# Patient Record
Sex: FEMALE | Race: WHITE | Employment: OTHER | URBAN - METROPOLITAN AREA
[De-identification: names, ages, dates, MRNs, and addresses within clinical notes are randomized per-mention and may not be internally consistent; named-entity substitution may affect disease eponyms.]

---

## 2018-07-18 PROBLEM — M54.50 LOWER BACK PAIN: Status: ACTIVE | Noted: 2018-07-18

## 2018-07-18 PROBLEM — M81.0 OSTEOPOROSIS: Status: ACTIVE | Noted: 2018-07-18

## 2018-07-18 PROBLEM — I10 HTN (HYPERTENSION): Status: ACTIVE | Noted: 2018-07-18

## 2018-07-18 PROBLEM — R51.9 FACIAL PAIN: Status: ACTIVE | Noted: 2018-07-18

## 2018-07-18 PROBLEM — M15.9 GENERALIZED OSTEOARTHRITIS: Status: ACTIVE | Noted: 2018-07-18

## 2018-07-18 PROBLEM — E78.5 HYPERLIPIDEMIA: Status: ACTIVE | Noted: 2018-07-18

## 2018-07-18 RX ORDER — MELOXICAM 15 MG/1
15 TABLET ORAL DAILY
COMMUNITY
End: 2020-04-20 | Stop reason: SDUPTHER

## 2018-07-18 RX ORDER — FLUTICASONE PROPIONATE 50 MCG
1 SPRAY, SUSPENSION (ML) NASAL DAILY
COMMUNITY
End: 2020-05-07 | Stop reason: SDUPTHER

## 2018-07-18 RX ORDER — HYDROCODONE BITARTRATE AND ACETAMINOPHEN 5; 325 MG/1; MG/1
1 TABLET ORAL
COMMUNITY
End: 2018-10-29 | Stop reason: SDUPTHER

## 2018-07-18 RX ORDER — GABAPENTIN 100 MG/1
100 CAPSULE ORAL 2 TIMES DAILY
COMMUNITY
End: 2019-01-08 | Stop reason: SDUPTHER

## 2018-07-18 RX ORDER — TRAZODONE HYDROCHLORIDE 50 MG/1
TABLET ORAL
COMMUNITY
End: 2019-10-23 | Stop reason: SDUPTHER

## 2018-07-18 RX ORDER — ALENDRONATE SODIUM 70 MG/1
TABLET ORAL
COMMUNITY
End: 2019-10-23

## 2018-10-29 ENCOUNTER — OFFICE VISIT (OUTPATIENT)
Dept: FAMILY MEDICINE CLINIC | Age: 78
End: 2018-10-29

## 2018-10-29 DIAGNOSIS — R03.0 ELEVATED BP WITHOUT DIAGNOSIS OF HYPERTENSION: ICD-10-CM

## 2018-10-29 DIAGNOSIS — M54.41 CHRONIC BILATERAL LOW BACK PAIN WITH BILATERAL SCIATICA: Primary | ICD-10-CM

## 2018-10-29 DIAGNOSIS — M54.42 CHRONIC BILATERAL LOW BACK PAIN WITH BILATERAL SCIATICA: Primary | ICD-10-CM

## 2018-10-29 DIAGNOSIS — G89.29 CHRONIC BILATERAL LOW BACK PAIN WITH BILATERAL SCIATICA: Primary | ICD-10-CM

## 2018-10-29 DIAGNOSIS — G51.8 FACIAL NEURALGIA: ICD-10-CM

## 2018-10-29 RX ORDER — HYDROCODONE BITARTRATE AND ACETAMINOPHEN 5; 325 MG/1; MG/1
1 TABLET ORAL
Qty: 45 TAB | Refills: 0 | Status: SHIPPED | OUTPATIENT
Start: 2018-12-29 | End: 2019-01-22 | Stop reason: SDUPTHER

## 2018-10-29 RX ORDER — HYDROCODONE BITARTRATE AND ACETAMINOPHEN 5; 325 MG/1; MG/1
1 TABLET ORAL
Qty: 45 TAB | Refills: 0 | Status: SHIPPED | OUTPATIENT
Start: 2018-10-29 | End: 2018-10-29 | Stop reason: SDUPTHER

## 2018-10-29 RX ORDER — HYDROCODONE BITARTRATE AND ACETAMINOPHEN 5; 325 MG/1; MG/1
1 TABLET ORAL
Qty: 45 TAB | Refills: 0 | Status: SHIPPED | OUTPATIENT
Start: 2018-11-29 | End: 2018-10-29 | Stop reason: SDUPTHER

## 2018-10-29 NOTE — PATIENT INSTRUCTIONS
Learning About How to Have a Healthy Back What causes back pain? Back pain is often caused by overuse, strain, or injury. For example, people often hurt their backs playing sports or working in the yard, being jolted in a car accident, or lifting something too heavy. Aging plays a part too. Your bones and muscles tend to lose strength as you age, which makes injury more likely. The spongy discs between the bones of the spine (vertebrae) may suffer from wear and tear and no longer provide enough cushion between the bones. A disc that bulges or breaks open (herniated disc) can press on nerves, causing back pain. In some people, back pain is the result of arthritis, broken vertebrae caused by bone loss (osteoporosis), illness, or a spine problem. Although most people have back pain at one time or another, there are steps you can take to make it less likely. How can you have a healthy back? Reduce stress on your back through good posture Slumping or slouching alone may not cause low back pain. But after the back has been strained or injured, bad posture can make pain worse. · Sleep in a position that maintains your back's normal curves and on a mattress that feels comfortable. Sleep on your side with a pillow between your knees, or sleep on your back with a pillow under your knees. These positions can reduce strain on your back. · Stand and sit up straight. \"Good posture\" generally means your ears, shoulders, and hips are in a straight line. · If you must stand for a long time, put one foot on a stool, ledge, or box. Switch feet every now and then. · Sit in a chair that is low enough to let you place both feet flat on the floor with both knees nearly level with your hips. If your chair or desk is too high, use a footrest to raise your knees. Place a small pillow, a rolled-up towel, or a lumbar roll in the curve of your back if you need extra support. · Try a kneeling chair, which helps tilt your hips forward. This takes pressure off your lower back. · Try sitting on an exercise ball. It can rock from side to side, which helps keep your back loose. · When driving, keep your knees nearly level with your hips. Sit straight, and drive with both hands on the steering wheel. Your arms should be in a slightly bent position. Reduce stress on your back through careful lifting · Squat down, bending at the hips and knees only. If you need to, put one knee to the floor and extend your other knee in front of you, bent at a right angle (half kneeling). · Press your chest straight forward. This helps keep your upper back straight while keeping a slight arch in your low back. · Hold the load as close to your body as possible, at the level of your belly button (navel). · Use your feet to change direction, taking small steps. · Lead with your hips as you change direction. Keep your shoulders in line with your hips as you move. · Set down your load carefully, squatting with your knees and hips only. Exercise and stretch your back · Do some exercise on most days of the week, if your doctor says it is okay. You can walk, run, swim, or cycle. · Stretch your back muscles. Here are a few exercises to try: 
? Lie on your back, and gently pull one bent knee to your chest. Put that foot back on the floor, and then pull the other knee to your chest. 
? Do pelvic tilts. Lie on your back with your knees bent. Tighten your stomach muscles. Pull your belly button (navel) in and up toward your ribs. You should feel like your back is pressing to the floor and your hips and pelvis are slightly lifting off the floor. Hold for 6 seconds while breathing smoothly. ? Sit with your back flat against a wall. · Keep your core muscles strong. The muscles of your back, belly (abdomen), and buttocks support your spine. ? Pull in your belly and imagine pulling your navel toward your spine. Hold this for 6 seconds, then relax. Remember to keep breathing normally as you tense your muscles. ? Do curl-ups. Always do them with your knees bent. Keep your low back on the floor, and curl your shoulders toward your knees using a smooth, slow motion. Keep your arms folded across your chest. If this bothers your neck, try putting your hands behind your neck (not your head), with your elbows spread apart. ? Lie on your back with your knees bent and your feet flat on the floor. Tighten your belly muscles, and then push with your feet and raise your buttocks up a few inches. Hold this position 6 seconds as you continue to breathe normally, then lower yourself slowly to the floor. Repeat 8 to 12 times. ? If you like group exercise, try Pilates or yoga. These classes have poses that strengthen the core muscles. Lead a healthy lifestyle · Stay at a healthy weight to avoid strain on your back. · Do not smoke. Smoking increases the risk of osteoporosis, which weakens the spine. If you need help quitting, talk to your doctor about stop-smoking programs and medicines. These can increase your chances of quitting for good. Where can you learn more? Go to http://tiffany-maryanne.info/. Enter L315 in the search box to learn more about \"Learning About How to Have a Healthy Back. \" Current as of: November 29, 2017 Content Version: 11.8 © 9318-1149 [employer]. Care instructions adapted under license by [employer] (which disclaims liability or warranty for this information). If you have questions about a medical condition or this instruction, always ask your healthcare professional. [place] [address] any warranty or liability for your use of this information. Elevated Blood Pressure: Care Instructions Your Care Instructions Blood pressure is a measure of how hard the blood pushes against the walls of your arteries. It's normal for blood pressure to go up and down throughout the day. But if it stays up over time, you have high blood pressure. Two numbers tell you your blood pressure. The first number is the systolic pressure. It shows how hard the blood pushes when your heart is pumping. The second number is the diastolic pressure. It shows how hard the blood pushes between heartbeats, when your heart is relaxed and filling with blood. An ideal blood pressure in adults is less than 120/80 (say \"120 over 80\"). High blood pressure is 140/90 or higher. You have high blood pressure if your top number is 140 or higher or your bottom number is 90 or higher, or both. The main test for high blood pressure is simple, fast, and painless. To diagnose high blood pressure, your doctor will test your blood pressure at different times. After testing your blood pressure, your doctor may ask you to test it again when you are home. If you are diagnosed with high blood pressure, you can work with your doctor to make a long-term plan to manage it. Follow-up care is a key part of your treatment and safety. Be sure to make and go to all appointments, and call your doctor if you are having problems. It's also a good idea to know your test results and keep a list of the medicines you take. How can you care for yourself at home? · Do not smoke. Smoking increases your risk for heart attack and stroke. If you need help quitting, talk to your doctor about stop-smoking programs and medicines. These can increase your chances of quitting for good. · Stay at a healthy weight. · Try to limit how much sodium you eat to less than 2,300 milligrams (mg) a day. Your doctor may ask you to try to eat less than 1,500 mg a day. · Be physically active. Get at least 30 minutes of exercise on most days of the week. Walking is a good choice.  You also may want to do other activities, such as running, swimming, cycling, or playing tennis or team sports. · Avoid or limit alcohol. Talk to your doctor about whether you can drink any alcohol. · Eat plenty of fruits, vegetables, and low-fat dairy products. Eat less saturated and total fats. · Learn how to check your blood pressure at home. When should you call for help? Call your doctor now or seek immediate medical care if: 
? · Your blood pressure is much higher than normal (such as 180/110 or higher). ? · You think high blood pressure is causing symptoms such as: ¨ Severe headache. ¨ Blurry vision. ? Watch closely for changes in your health, and be sure to contact your doctor if: 
? · You do not get better as expected. Where can you learn more? Go to http://tiffany-maryanne.info/. Enter Z663 in the search box to learn more about \"Elevated Blood Pressure: Care Instructions. \" Current as of: September 21, 2016 Content Version: 11.4 © 8475-4641 [employer]. Care instructions adapted under license by [employer] (which disclaims liability or warranty for this information). If you have questions about a medical condition or this instruction, always ask your healthcare professional. [place] [address] any warranty or liability for your use of this information.

## 2018-10-29 NOTE — PROGRESS NOTES
Identified pt with two pt identifiers(name and ). Reviewed record in preparation for visit and have obtained necessary documentation. Chief Complaint Patient presents with  Neuroma  
  nerve pain Health Maintenance Due Topic  DTaP/Tdap/Td series (1 - Tdap)  Shingrix Vaccine Age 50> (1 of 2)  GLAUCOMA SCREENING Q2Y  Bone Densitometry (Dexa) Screening  Pneumococcal 65+ Low/Medium Risk (1 of 2 - PCV13)  Influenza Age 5 to Adult Coordination of Care Questionnaire: 
:  
1) Have you been to an emergency room, urgent care, or hospitalized since your last visit?   no If yes, where when, and reason for visit? 2. Have seen or consulted any other health care provider since your last visit? NO If yes, where when, and reason for visit? 3) Do you have an Advanced Directive/ Living Will in place? NO If yes, do we have a copy on file NO If no, would you like information NO Patient is accompanied by self I have received verbal consent from Jimmy Alaniz to discuss any/all medical information while they are present in the room.

## 2018-10-29 NOTE — PROGRESS NOTES
Subjective Armond Arora is an 66 y.o. female who presents for lower back pain HPI Lower Back Pain Duration: > 5 years Medications: patient takes Gabapentin 100mg, 2tabs in evening, Norco 5-325 1.5 daily LOCATION: in the sacroiliac area RADIATION: left and right buttock and anterior and posterior thigh CHARACTER OF PAIN: intermittent, moderate in intensity, burning, and stinging ACUTE OR CHRONIC?: a chronic problem, with essentially constant pain AGGRAVATING FACTORS: laying down ASSOCIATED SYMPTOMS: restless legs and buttock and entire legs hurt. She denies any incontinence, saddle anesthesia, or LE weakness Prior Treatment: patient reports having laminectomy for this in the past by Dr. Minerva [facility] [place], [place]) Facial Pain Duration: 3 years Patient reports that she fell on her face and broke part of her right orbit several years ago Medications: patient takes Gabapentin 100mg, 2tabs in evening, Norco 5-325 1.5 daily Associated Symptoms: patient denies any problems with vision or swallowing Controlled Medicines Medicine: Norco 
Last Dose: this morning Current Dosing: takes 1 tab daily UDS: 2/2018 within normal limits Review of Systems Respiratory: Negative for apnea and chest tightness. Cardiovascular: Negative for chest pain and leg swelling. Allergies - reviewed: Allergies Allergen Reactions  Alendronate Unknown (comments)  Doxycycline Unknown (comments)  Erythromycin Unknown (comments)  Nsaids (Non-Steroidal Anti-Inflammatory Drug) Unknown (comments)  Pcn [Penicillins] Unknown (comments) Medications - reviewed:  
Current Outpatient Medications Medication Sig  [START ON 12/29/2018] HYDROcodone-acetaminophen (NORCO) 5-325 mg per tablet Take 1 Tab by mouth every twelve (12) hours as needed for Pain. Max Daily Amount: 2 Tabs.  fluticasone (FLONASE) 50 mcg/actuation nasal spray 1 Irvona by Both Nostrils route daily.  gabapentin (NEURONTIN) 100 mg capsule Take 100 mg by mouth two (2) times a day.  meloxicam (MOBIC) 15 mg tablet Take 15 mg by mouth daily.  traZODone (DESYREL) 50 mg tablet Take  by mouth nightly.  alendronate (FOSAMAX) 70 mg tablet Take  by mouth every seven (7) days. No current facility-administered medications for this visit. Past Medical History - reviewed: 
Past Medical History:  
Diagnosis Date  Allergic rhinitis  Carpal tunnel syndrome  Facial pain 7/18/2018  Fibromyalgia  Generalized osteoarthritis 7/18/2018  GERD (gastroesophageal reflux disease)  Hyperlipemia  Hyperlipidemia 7/18/2018  Hypertension  IBS (irritable bowel syndrome)  Lower back pain 7/18/2018  Osteoarthritis  Osteoporosis 7/18/2018 Past Surgical History - reviewed:  
Past Surgical History:  
Procedure Laterality Date  HX COLONOSCOPY  01/2010  HX HYSTERECTOMY  HX OTHER SURGICAL    
 lumbar disc surgery  HX SALPINGO-OOPHORECTOMY Social History - reviewed: 
Social History Socioeconomic History  Marital status:  Spouse name: Not on file  Number of children: Not on file  Years of education: Not on file  Highest education level: Not on file Social Needs  Financial resource strain: Not on file  Food insecurity - worry: Not on file  Food insecurity - inability: Not on file  Transportation needs - medical: Not on file  Transportation needs - non-medical: Not on file Occupational History  Not on file Tobacco Use  Smoking status: Never Smoker  Smokeless tobacco: Never Used Substance and Sexual Activity  Alcohol use: No  
  Frequency: Never  Drug use: No  
 Sexual activity: Not on file Other Topics Concern  Not on file Social History Narrative  Not on file Family History - reviewed: 
Family History Problem Relation Age of Onset  Stroke Mother  Heart Attack Father  Hypertension Brother  Inflammatory Bowel Dz Brother Visit Vitals /86 (BP 1 Location: Left arm, BP Patient Position: Sitting) Pulse 69 Temp 98.3 °F (36.8 °C) (Oral) Resp 18 Ht 5' 3\" (1.6 m) Wt 118 lb (53.5 kg) BMI 20.90 kg/m² Physical Exam  
Constitutional: well-developed and well-nourished. HENT:  
Head: Normocephalic and atraumatic. Eyes: Conjunctivae are normal. Pupils are equal, round, and reactive to light. Neck: Normal range of motion. Neck supple. No JVD present. No tracheal deviation present. No thyromegaly present. Cardiovascular: Normal rate, regular rhythm and normal heart sounds. Exam reveals no friction rub. No murmur heard. Pulmonary/Chest: Effort normal and breath sounds normal. No stridor. No respiratory distress. no wheezes. no rales. no tenderness. BACK EXAM:  
Inspection: exaggerated lumbar lordosis;  midline laminectomy scar;  
Palpation: normal palpation of the bony posterior spinous processes, the sacral spine, iliac crest, posterior iliac spine and coccyx  without soft tissue abnormality; Neurovascular: reduced throughout.;  
Muscular Strength: 5/5 bilateral quadriceps;  5/5 bilateral tibialis anterior;  5/5 bilateral extensor hallucis longus;  5/5 bilateral extensor digitorum longus and brevis;  5/5 bilateral peroneus longus and brevis;  5/5 bilateral gastrocnemius-soleus;  
Range of Motion: limited passive ROM with extension, left lateral bending, and right lateral bending;  
Maneuvers: (-) bilateral straight leg raise Assessment/Plan 1. Chronic bilateral low back pain with bilateral sciatica: Stable.  wnl. Controlled substances contract on file. UDS within last year. Discussed precautions with medications. Patient acknowledges understanding. Will refill for 3 months 
- HYDROcodone-acetaminophen (NORCO) 5-325 mg per tablet;  Take 1 Tab by mouth every twelve (12) hours as needed for Pain. Max Daily Amount: 2 Tabs. Dispense: 45 Tab; Refill: 0 
 
2. Facial neuralgia: stable 
- see plan as above 3. Elevated BP without diagnosis of hypertension: patient's BP elevated today, but she denies any HA, dizziness, or other concerning symptoms. She declines coming back in tomorrow for nurse visit, but states that she will schedule a one week follow up. She would like to hold off on any BP medications until that time. -RTC 1 week for BP check Follow-up Disposition: 
Return in about 1 week (around 11/5/2018) for blood pressure. I have discussed the diagnosis with the patient and the intended plan as seen in the above orders. Patient verbalized understanding of the plan and agrees with the plan. The patient has received an after-visit summary and questions were answered concerning future plans. I have discussed medication side effects and warnings with the patient as well. Informed patient to return to the office if new symptoms arise. Bernarda Mishra MD 
Family Medicine Resident

## 2018-10-29 NOTE — LETTER
Name:Sailaja Laughlin HO MR #:[id number] Provider Amy Leyva MD  
*[id number]* BSMG-491 () Page 1 of 5 Initial [employer] CONTROLLED SUBSTANCE AGREEMENT I may be prescribed medications that are controlled substances as part  of my treatment plan for management of my medical condition(s). The goal of my treatment plan is to maintain and/or improve my health and wellbeing. Because controlled substances have an increased risk of abuse or harm, continual re-evaluation is needed determine if the goals of my treatment plan are being met for my safety and the safety of others. Saúl Ill  am entering into this Controlled Substance Agreement with my provider, David Dumont MD at [facility] . I understand that successful treatment requires mutual trust and honesty between me and my provider. I understand that there are state and federal laws and regulations which apply to the medications that my provider may prescribe that must be followed. I understand there are risks and benefits ts of taking the medicines that my provider may prescribe. I understand and agree that following this Agreement is necessary in continuing my provider-patient relationship and success of my treatment plan. As a part of my treatment plan, I agree to the following: COMMUNICATION: 
 
1. I will communicate fully with my provider about my medical condition(s), including the effect on my daily life and how well my medications are helping. I will tell my provider all of the medications that I take for any reason, including medications I receive from another health care provider, and will notify my provider about all issues, problems or concerns, including any side effects, which may be related to my medications. I understand that this information allows my provider to adjust my treatment plan to help manage my medical condition.  I understand that this information will become part of my permanent medical record. 2. I will notify my provider if I have a history of alcohol/drug misuse/addiction or if I have had treatment for alcohol/drug addiction in the past, or if I have a new problem with or concern about alcohol/drug use/addiction, because this increases the likelihood of high risk behaviors and may lead to serious medical conditions. 3. Females Only: I will notify my provider if I am or become pregnant, or if I intend to become pregnant, or if I intend to breastfeed. I understand that communication of these issues with my provider is important, due to possible effects my medication could have on an unborn fetus or breastfeeding child. Name:Sailaja Laughlin S:[id number] MR #:[id number] Provider Nelson Murphy MD  
*[id number]* BSMG-491 (5/16) Page 2 of 5 Initial [employer] MISUSE OF MEDICATIONS / DRUGS: 
 
1. I agree to take all controlled substances as prescribed, and will not misuse or abuse any controlled substances prescribed by my provider. For my safety, I will not increase the amount of medicine I take without first talking with and getting permission from my provider. 2. If I have a medical emergency, another health care provider may prescribe me medication. If I seek emergency treatment, I will notify my provider within seventy-two (72) hours. 3. I understand that my provider may discuss my use and/or possible misuse/abuse of controlled substances and alcohol, as appropriate, with any health care provider involved in my care, pharmacist or legal authority. ILLEGAL DRUGS: 
 
1. I will not use illegal drugs of any kind, including but not limited to marijuana, heroin, cocaine, or any prescription drug which is not prescribed to me. DRUG DIVERSION / PRESCRIPTION FRAUD: 
 
1. I will not share, sell, trade, give away, or otherwise misuse my prescriptions or medications. 2. I will not alter any prescriptions provided to me by my provider. SINGLE PROVIDER: 
 
1. I agree that all controlled substances that I take will be prescribed only by my provider (or his/her covering provider) under this Agreement. This agreement does not prevent me from seeking emergency medical treatment or receiving pain management related to a surgery. PROTECTING MEDICATIONS: 
 
1. I am responsible for keeping my prescriptions and medications in a safe and secure place including safeguarding them from loss or theft. I understand that lost, stolen or damaged/destroyed prescriptions or medications will not be replaced. Name:Sailaja Laughlin RJV:27/3/1807 MR #:[id number] Provider Juancho Jacobo MD  
*[id number]* BSMG-491 (5/16) Page 3 of 5 Initial [employer] PRESCRIPTION RENEWALS/REFILLS: 
 
1. I will follow my controlled substance medication schedule as prescribed by my provider. 2. I understand and agree that I will make any requests for renewals or refills of my prescriptions only at the time of an office visit or during my providers regular office hours subject to the prescription refill requirements of the individual practice. 3. I understand that my provider may not call in prescriptions for controlled substances to my pharmacy. 4. I understand that my provider may adjust or discontinue these medications as deemed appropriate for my medical treatment plan. This Agreement does not guarantee the prescription of controlled medications. 5. I agree that if my medications are adjusted or discontinued, I will properly dispose of any remaining medications. I understand that I will be required to dispose of any remaining controlled medications prior to being provided with any prescriptions for other controlled medications.  
 
 
1. I authorize my provider and my pharmacy to cooperate fully with any local, state, or federal law enforcement agency in the investigation of any possible misuse, sale, or other diversion of my controlled substance prescriptions or medications. RISKS: 
 
 
1. I understand that if I do not adhere to this Agreement in any way, my provider may change my prescriptions, stop prescribing controlled substances or end our provider-patient relationship. 2. If my provider decides to stop prescribing medication, or decides to end our provider-patient relationship,my provider may require that I taper my medications slowly. If necessary, my provider may also provide a prescription for other medications to treat my withdrawal symptoms. UNDERSTANDING THIS AGREEMENT: 
 
I understand that my provider may adjust or stop my prescriptions for controlled substances based on my medical condition and my treatment plan. I understand that this Agreement does not guarantee that I will be prescribed medications or controlled substances. I understand that controlled substances may be just one part 
of my treatment plan. My initial on each page and my signature below shows that I have read each page of this Agreement, I have had an opportunity to ask questions, and all of my questions have been answered to my satisfaction by my provider. By signing below, I agree to comply with this Agreement, and I understand that if I do not follow the Agreements listed above, my provider may stop 
 
 
 
_________________________________________  Date/Time 10/29/2018 11:11 AM   
             (Patient Signature)

## 2018-10-31 VITALS
WEIGHT: 118 LBS | RESPIRATION RATE: 18 BRPM | TEMPERATURE: 98.3 F | BODY MASS INDEX: 20.91 KG/M2 | SYSTOLIC BLOOD PRESSURE: 174 MMHG | HEART RATE: 69 BPM | DIASTOLIC BLOOD PRESSURE: 86 MMHG | HEIGHT: 63 IN

## 2019-01-08 RX ORDER — GABAPENTIN 100 MG/1
CAPSULE ORAL
Qty: 180 CAP | Refills: 1 | Status: SHIPPED | OUTPATIENT
Start: 2019-01-08 | End: 2019-08-01 | Stop reason: ALTCHOICE

## 2019-01-22 ENCOUNTER — OFFICE VISIT (OUTPATIENT)
Dept: FAMILY MEDICINE CLINIC | Age: 79
End: 2019-01-22

## 2019-01-22 VITALS
RESPIRATION RATE: 16 BRPM | TEMPERATURE: 98.4 F | DIASTOLIC BLOOD PRESSURE: 68 MMHG | HEART RATE: 100 BPM | WEIGHT: 114 LBS | SYSTOLIC BLOOD PRESSURE: 166 MMHG | HEIGHT: 63 IN | BODY MASS INDEX: 20.2 KG/M2 | OXYGEN SATURATION: 98 %

## 2019-01-22 DIAGNOSIS — M54.42 CHRONIC BILATERAL LOW BACK PAIN WITH BILATERAL SCIATICA: ICD-10-CM

## 2019-01-22 DIAGNOSIS — M54.41 CHRONIC BILATERAL LOW BACK PAIN WITH BILATERAL SCIATICA: ICD-10-CM

## 2019-01-22 DIAGNOSIS — I10 ESSENTIAL HYPERTENSION: ICD-10-CM

## 2019-01-22 DIAGNOSIS — G50.0 INFRAORBITAL NEURALGIA: Primary | ICD-10-CM

## 2019-01-22 DIAGNOSIS — G89.29 CHRONIC BILATERAL LOW BACK PAIN WITH BILATERAL SCIATICA: ICD-10-CM

## 2019-01-22 RX ORDER — LISINOPRIL 5 MG/1
5 TABLET ORAL DAILY
Qty: 90 TAB | Refills: 1 | Status: SHIPPED | OUTPATIENT
Start: 2019-01-22 | End: 2019-03-28 | Stop reason: DRUGHIGH

## 2019-01-22 RX ORDER — HYDROCODONE BITARTRATE AND ACETAMINOPHEN 5; 325 MG/1; MG/1
1 TABLET ORAL
Qty: 45 TAB | Refills: 0 | Status: SHIPPED | OUTPATIENT
Start: 2019-03-01 | End: 2019-01-22 | Stop reason: SDUPTHER

## 2019-01-22 RX ORDER — HYDROCODONE BITARTRATE AND ACETAMINOPHEN 5; 325 MG/1; MG/1
1 TABLET ORAL
Qty: 45 TAB | Refills: 0 | Status: SHIPPED | OUTPATIENT
Start: 2019-04-01 | End: 2019-04-25 | Stop reason: SDUPTHER

## 2019-01-22 RX ORDER — HYDROCODONE BITARTRATE AND ACETAMINOPHEN 5; 325 MG/1; MG/1
1 TABLET ORAL
Qty: 45 TAB | Refills: 0 | Status: SHIPPED | OUTPATIENT
Start: 2019-01-29 | End: 2019-01-22 | Stop reason: SDUPTHER

## 2019-01-22 NOTE — PATIENT INSTRUCTIONS
Learning About ACE Inhibitors  Introduction    ACE (angiotensin-converting enzyme) inhibitors stop the release of an enzyme. This enzyme makes your blood vessels smaller. Without it, your blood vessels relax and get bigger. This lowers your blood pressure. These medicines also increase how much water and salt go into your urine. This also lowers blood pressure. You may take this kind of medicine if you have high blood pressure. Or you may take it if you have heart problems, kidney problems, or diabetes. If you have coronary artery disease, this medicine can help prevent heart attacks and strokes. Examples  · Benazepril (Lotensin)  · Lisinopril (Prinivil, Zestril)  · Ramipril (Altace)  This is not a complete list.  Possible side effects  Side effects may include:  · A cough. · Low blood pressure. This can make you feel dizzy or weak. · Too much potassium in your body. · Swelling. This may be a sign of an allergic reaction. You may have other side effects or reactions not listed here. Check the information that comes with your medicine. What to know about taking this medicine  · ACE inhibitors can cause a dry cough. Talk to your doctor if you have a dry cough. You may need a different medicine. · These medicines can cause an allergic reaction. This can cause a little swelling. Or it can cause red bumps on your skin that hurt. In rare cases, the swelling may make it hard for you to breathe. · Do not take this medicine if you are pregnant. And don't take it if you plan to become pregnant. · Take your medicines exactly as prescribed. Call your doctor if you think you are having a problem with your medicine. · Check with your doctor or pharmacist before you use any other medicines. This includes over-the-counter medicines. Make sure your doctor knows all of the medicines, vitamins, herbal products, and supplements you take. Taking some medicines together can cause problems.   · You may need regular blood tests.  Where can you learn more? Go to http://tiffany-maryanne.info/. Enter P050 in the search box to learn more about \"Learning About ACE Inhibitors. \"  Current as of: July 22, 2018  Content Version: 11.9  © 0670-5633 [employer]. Care instructions adapted under license by [employer] (which disclaims liability or warranty for this information). If you have questions about a medical condition or this instruction, always ask your healthcare professional. [place] [address] any warranty or liability for your use of this information.

## 2019-01-22 NOTE — PROGRESS NOTES
Chief Complaint   Patient presents with    Medication Refill     HYDROCODONE/ACETAMENAPHIN        Health Maintenance Due   Topic    DTaP/Tdap/Td series (1 - Tdap)    Shingrix Vaccine Age 50> (1 of 2)    GLAUCOMA SCREENING Q2Y     Bone Densitometry (Dexa) Screening     Influenza Age 5 to Adult     MEDICARE YEARLY EXAM        Wt Readings from Last 3 Encounters:   01/22/19 114 lb (51.7 kg)   10/29/18 118 lb (53.5 kg)     Temp Readings from Last 3 Encounters:   01/22/19 98.4 °F (36.9 °C) (Oral)   10/29/18 98.3 °F (36.8 °C) (Oral)     BP Readings from Last 3 Encounters:   01/22/19 167/80   10/29/18 174/86     Pulse Readings from Last 3 Encounters:   01/22/19 100   10/29/18 69         Learning Assessment:  :     No flowsheet data found. Depression Screening:  :     PHQ over the last two weeks 1/22/2019   Little interest or pleasure in doing things Not at all   Feeling down, depressed, irritable, or hopeless Not at all   Total Score PHQ 2 0       Fall Risk Assessment:  :     Fall Risk Assessment, last 12 mths 1/22/2019   Able to walk? Yes   Fall in past 12 months? No       Abuse Screening:  :     No flowsheet data found. Coordination of Care Questionnaire:  :     1) Have you been to an emergency room, urgent care clinic since your last visit? NO   Hospitalized since your last visit? NO          2) Have you seen or consulted any other health care providers outside of [address] [place] [address] since your last visit? NO      3) Do you have an Advance Directive on file? NO    Patient is accompanied by self I have received verbal consent from Whitney Farias to discuss any/all medical information while they are present in the room.

## 2019-01-22 NOTE — PROGRESS NOTES
Anisha Flaherty is an 66 y.o. female who presents for infraorbital neuralgia and low back pain     HPI  Lower Back Pain  Duration: > 5 years  Medications: patient takes Gabapentin 100mg, 2tabs in evening, Norco 5-325 1.5 daily  LOCATION: in the sacroiliac area   RADIATION: left and right buttock and anterior and posterior thigh   CHARACTER OF PAIN: intermittent, moderate in intensity, burning, and stinging   ACUTE OR CHRONIC?: a chronic problem, with essentially constant pain   AGGRAVATING FACTORS: laying down   ASSOCIATED SYMPTOMS: restless legs and buttock and entire legs hurt. She denies any fecal incontinence, saddle anesthesia, or LE weakness  Prior Treatment: patient reports having laminectomy for this in the past by Dr. Smiley Sotomayor [facility] [place], [place])     Facial Pain  Duration: 3 years  Patient reports that she fell on her face and broke part of her right orbit several years ago  Medications: patient takes Gabapentin 100mg, 2tabs in evening, Norco 5-325 1.5 daily  Associated Symptoms: patient denies any problems with vision or swallowing     Controlled Medicines  Medicine: Norco  Last Dose: last night  Current Dosing: takes 1.5 tabs daily  UDS: 2/2018 within normal limits  Last Rx: 10/29/18, 45 tabs/month with 2 refills           Review of Systems   Respiratory: Negative for apnea and chest tightness. Cardiovascular: Negative for chest pain and leg swelling. Allergies - reviewed: Allergies   Allergen Reactions    Alendronate Unknown (comments)    Doxycycline Unknown (comments)    Erythromycin Unknown (comments)    Nsaids (Non-Steroidal Anti-Inflammatory Drug) Unknown (comments)    Pcn [Penicillins] Unknown (comments)         Medications - reviewed:   Current Outpatient Medications   Medication Sig    [START ON 4/1/2019] HYDROcodone-acetaminophen (NORCO) 5-325 mg per tablet Take 1 Tab by mouth every twelve (12) hours as needed for Pain. Max Daily Amount: 2 Tabs.     lisinopril (PRINIVIL, ZESTRIL) 5 mg tablet Take 1 Tab by mouth daily.  gabapentin (NEURONTIN) 100 mg capsule TAKE 2 CAPSULES EVERY DAY  IN  MID  AFTERNOON  FOR  RESTLESS LEGS AT NIGHT    fluticasone (FLONASE) 50 mcg/actuation nasal spray 1 [place] by Both Nostrils route daily.  meloxicam (MOBIC) 15 mg tablet Take 15 mg by mouth daily.  traZODone (DESYREL) 50 mg tablet Take  by mouth nightly.  alendronate (FOSAMAX) 70 mg tablet Take  by mouth every seven (7) days. No current facility-administered medications for this visit.           Past Medical History - reviewed:  Past Medical History:   Diagnosis Date    Allergic rhinitis     Carpal tunnel syndrome     Facial pain 7/18/2018    Fibromyalgia     Generalized osteoarthritis 7/18/2018    GERD (gastroesophageal reflux disease)     Hyperlipemia     Hyperlipidemia 7/18/2018    Hypertension     IBS (irritable bowel syndrome)     Lower back pain 7/18/2018    Osteoarthritis     Osteoporosis 7/18/2018         Past Surgical History - reviewed:   Past Surgical History:   Procedure Laterality Date    HX COLONOSCOPY  01/2010    HX HYSTERECTOMY      HX OTHER SURGICAL      lumbar disc surgery    HX SALPINGO-OOPHORECTOMY           Social History - reviewed:  Social History     Socioeconomic History    Marital status:      Spouse name: Not on file    Number of children: Not on file    Years of education: Not on file    Highest education level: Not on file   Social Needs    Financial resource strain: Not on file    Food insecurity - worry: Not on file    Food insecurity - inability: Not on file   [employer] needs - medical: Not on file   [employer] needs - non-medical: Not on file   Occupational History    Not on file   Tobacco Use    Smoking status: Never Smoker    Smokeless tobacco: Never Used   Substance and Sexual Activity    Alcohol use: No     Frequency: Never    Drug use: No    Sexual activity: Yes   Other Topics Concern    Not on file Social History Narrative    Not on file         Family History - reviewed:  Family History   Problem Relation Age of Onset    Stroke Mother     Heart Attack Father     Hypertension Brother     Inflammatory Bowel Dz Brother          Visit Vitals  /68   Pulse 100   Temp 98.4 °F (36.9 °C) (Oral)   Resp 16   Ht 5' 3\" (1.6 m)   Wt 114 lb (51.7 kg)   SpO2 98%   BMI 20.19 kg/m²     Physical Exam   Constitutional: well-developed and well-nourished. HENT:   Head: Normocephalic and atraumatic. Eyes: Conjunctivae are normal. Pupils are equal, round, and reactive to light. Neck: Normal range of motion. Neck supple. No JVD present. No tracheal deviation present. No thyromegaly present. Cardiovascular: Normal rate, regular rhythm and normal heart sounds. Exam reveals no friction rub. No murmur heard. Pulmonary/Chest: Effort normal and breath sounds normal. No stridor. No respiratory distress. no wheezes. no rales. no tenderness. Neuro: CN 2-11 grossly intact, facial sensation intact bilaterally.      BACK EXAM:   Inspection: exaggerated lumbar lordosis;  midline laminectomy scar;   Palpation: normal palpation of the bony posterior spinous processes, the sacral spine, iliac crest, posterior iliac spine and coccyx  without soft tissue abnormality; Neurovascular: reduced throughout.;   Muscular Strength: 5/5 bilateral quadriceps;  5/5 bilateral tibialis anterior;  5/5 bilateral extensor hallucis longus;  5/5 bilateral extensor digitorum longus and brevis;  5/5 bilateral peroneus longus and brevis;  5/5 bilateral gastrocnemius-soleus;   Range of Motion: limited passive ROM with extension, left lateral bending, and right lateral bending;   Maneuvers:   (-) bilateral straight leg raise     Assessment/Plan  1. 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 Stable on current treatment.  wnl. Controlled substances contract on file.  UDS within last year. Discussed precautions with medications. Patient acknowledges understanding. Will refill for 3 months  - HYDROcodone-acetaminophen (NORCO) 5-325 mg per tablet; Take 1 Tab by mouth every twelve (12) hours as needed for Pain. Max Daily Amount: 2 Tabs. Dispense: 45 Tab; Refill: 2  - continue gabapentin    2. Essential hypertension: BP elevated on multiple visits. Will initiate treatment with low dose Lisinopril and bring patient back in 1 week for nurse BP check. - lisinopril (PRINIVIL, ZESTRIL) 5 mg tablet; Take 1 Tab by mouth daily. Dispense: 90 Tab; Refill: 1  - discussed reasons to call or go to ED    3. Chronic bilateral low back pain with bilateral sciatica: stable with no new LE weakness, saddle anesthesia, or incontinence  - see pain management per above    Follow-up Disposition:  Return in about 4 weeks (around 2/19/2019) for annual physical.      I have discussed the diagnosis with the patient and the intended plan as seen in the above orders. Patient verbalized understanding of the plan and agrees with the plan. The patient has received an after-visit summary and questions were answered concerning future plans. I have discussed medication side effects and warnings with the patient as well. Informed patient to return to the office if new symptoms arise.         Adrianne Jones MD  Family Medicine Resident

## 2019-01-23 PROBLEM — G50.0: Status: ACTIVE | Noted: 2019-01-23

## 2019-02-20 ENCOUNTER — OFFICE VISIT (OUTPATIENT)
Dept: FAMILY MEDICINE CLINIC | Age: 79
End: 2019-02-20

## 2019-02-20 VITALS
WEIGHT: 114 LBS | HEIGHT: 63 IN | OXYGEN SATURATION: 97 % | BODY MASS INDEX: 20.2 KG/M2 | DIASTOLIC BLOOD PRESSURE: 84 MMHG | RESPIRATION RATE: 16 BRPM | TEMPERATURE: 98 F | SYSTOLIC BLOOD PRESSURE: 143 MMHG | HEART RATE: 77 BPM

## 2019-02-20 DIAGNOSIS — Z00.00 MEDICARE ANNUAL WELLNESS VISIT, SUBSEQUENT: Primary | ICD-10-CM

## 2019-02-20 DIAGNOSIS — M81.0 AGE-RELATED OSTEOPOROSIS WITHOUT CURRENT PATHOLOGICAL FRACTURE: ICD-10-CM

## 2019-02-20 DIAGNOSIS — Z13.31 SCREENING FOR DEPRESSION: ICD-10-CM

## 2019-02-20 DIAGNOSIS — H92.02 EAR PAIN, LEFT: ICD-10-CM

## 2019-02-20 DIAGNOSIS — Z13.39 SCREENING FOR ALCOHOLISM: ICD-10-CM

## 2019-02-20 NOTE — PATIENT INSTRUCTIONS
Medicare Wellness Visit, Female The best way to live healthy is to have a lifestyle where you eat a well-balanced diet, exercise regularly, limit alcohol use, and quit all forms of tobacco/nicotine, if applicable. Regular preventive services are another way to keep healthy. Preventive services (vaccines, screening tests, monitoring & exams) can help personalize your care plan, which helps you manage your own care. Screening tests can find health problems at the earliest stages, when they are easiest to treat. [facility] No follows the current, evidence-based guidelines published by the [facility] Jaime Kamilla ([facility]) when recommending preventive services for our patients. Because we follow these guidelines, sometimes recommendations change over time as research supports it. (For example, mammograms used to be recommended annually. Even though Medicare will still pay for an annual mammogram, the newer guidelines recommend a mammogram every two years for women of average risk.) Of course, you and your doctor may decide to screen more often for some diseases, based on your risk and your health status. Preventive services for you include: - Medicare offers their members a free annual wellness visit, which is time for you and your primary care provider to discuss and plan for your preventive service needs. Take advantage of this benefit every year! 
-All adults over the age of 72 should receive the recommended pneumonia vaccines. Current USPSTF guidelines recommend a series of two vaccines for the best pneumonia protection.  
-All adults should have a flu vaccine yearly and a tetanus vaccine every 10 years. All adults age 61 and older should receive a shingles vaccine once in their lifetime.   
-A bone mass density test is recommended when a woman turns 65 to screen for osteoporosis. This test is only recommended one time, as a screening. Some providers will use this same test as a disease monitoring tool if you already have osteoporosis. -All adults age 38-68 who are overweight should have a diabetes screening test once every three years.  
-Other screening tests and preventive services for persons with diabetes include: an eye exam to screen for diabetic retinopathy, a kidney function test, a foot exam, and stricter control over your cholesterol.  
-Cardiovascular screening for adults with routine risk involves an electrocardiogram (ECG) at intervals determined by your doctor.  
-Colorectal cancer screenings should be done for adults age 54-65 with no increased risk factors for colorectal cancer. There are a number of acceptable methods of screening for this type of cancer. Each test has its own benefits and drawbacks. Discuss with your doctor what is most appropriate for you during your annual wellness visit. The different tests include: colonoscopy (considered the best screening method), a fecal occult blood test, a fecal DNA test, and sigmoidoscopy. -Breast cancer screenings are recommended every other year for women of normal risk, age 54-69. 
-Cervical cancer screenings for women over age 72 are only recommended with certain risk factors.  
-All adults born between [facility] should be screened once for Hepatitis C. Here is a list of your current Health Maintenance items (your personalized list of preventive services) with a due date: 
Health Maintenance Due Topic Date Due  
 DTaP/Tdap/Td  (1 - Tdap) 10/01/1961  Shingles Vaccine (1 of 2) 10/01/1990  Bone Mineral Density   10/01/2005  Flu Vaccine  08/01/2018 Advance Care Planning: Care Instructions Your Care Instructions It can be hard to live with an illness that cannot be cured. But if your health is getting worse, you may want to make decisions about end-of-life care.  Planning for the end of your life does not mean that you are giving up. It is a way to make sure that your wishes are met. Clearly stating your wishes can make it easier for your loved ones. Making plans while you are still able may also ease your mind and make your final days less stressful and more meaningful. Follow-up care is a key part of your treatment and safety. Be sure to make and go to all appointments, and call your doctor if you are having problems. It's also a good idea to know your test results and keep a list of the medicines you take. What can you do to plan for the end of life? · You can bring these issues up with your doctor. You do not need to wait until your doctor starts the conversation. You might start with \"I would not be willing to live with . Lavada Saucer Lavada Saucer Lavada Saucer \" When you complete this sentence it helps your doctor understand your wishes. · Talk openly and honestly with your doctor. This is the best way to understand the decisions you will need to make as your health changes. Know that you can always change your mind. · Ask your doctor about commonly used life-support measures. These include tube feedings, breathing machines, and fluids given through a vein (IV). Understanding these treatments will help you decide whether you want them. · You may choose to have these life-supporting treatments for a limited time. This allows a trial period to see whether they will help you. You may also decide that you want your doctor to take only certain measures to keep you alive. It is important to spell out these conditions so that your doctor and family understand them. · Talk to your doctor about how long you are likely to live. He or she may be able to give you an idea of what usually happens with your specific illness. · Think about preparing papers that state your wishes. This way there will not be any confusion about what you want. You can change your instructions at any time. Which papers should you prepare? Advance directives are legal papers that tell doctors how you want to be cared for at the end of your life. You do not need a  to write these papers. Ask your doctor or your [facility] department for information on how to write your advance directives. They may have the forms for each of these types of papers. Make sure your doctor has a copy of these on file, and give a copy to a family member or close friend. · Consider a do-not-resuscitate order (DNR). This order asks that no extra treatments be done if your heart stops or you stop breathing. Extra treatments may include cardiopulmonary resuscitation (CPR), electrical shock to restart your heart, or a machine to breathe for you. If you decide to have a DNR order, ask your doctor to explain and write it. Place the order in your home where everyone can easily see it. · Consider a living will. A living will explains your wishes about life support and other treatments at the end of your life if you become unable to speak for yourself. Living vázquez tell doctors to use or not use treatments that would keep you alive. You must have one or two witnesses or a notary present when you sign this form. · Consider a durable power of  for health care. This allows you to name a person to make decisions about your care if you are not able to. Most people ask a close friend or family member. Talk to this person about the kinds of treatments you want and those that you do not want. Make sure this person understands your wishes. These legal papers are simple to change. Tell your doctor what you want to change, and ask him or her to make a note in your medical file. Give your family updated copies of the papers. Where can you learn more? Go to http://tiffany-maryanne.info/. Enter P184 in the search box to learn more about \"Advance Care Planning: Care Instructions. \" Current as of: November 17, 2016 Content Version: 11.3 © 3291-1224 Healthwise, Incorporated. Care instructions adapted under license by [employer] (which disclaims liability or warranty for this information). If you have questions about a medical condition or this instruction, always ask your healthcare professional. [address] any warranty or liability for your use of this information. Preventing Falls: Care Instructions Your Care Instructions Getting around your home safely can be a challenge if you have injuries or health problems that make it easy for you to fall. Loose rugs and furniture in walkways are among the dangers for many older people who have problems walking or who have poor eyesight. People who have conditions such as arthritis, osteoporosis, or dementia also have to be careful not to fall. You can make your home safer with a few simple measures. Follow-up care is a key part of your treatment and safety. Be sure to make and go to all appointments, and call your doctor if you are having problems. It's also a good idea to know your test results and keep a list of the medicines you take. How can you care for yourself at home? Taking care of yourself · You may get dizzy if you do not drink enough water. To prevent dehydration, drink plenty of fluids, enough so that your urine is light yellow or clear like water. Choose water and other caffeine-free clear liquids. If you have kidney, heart, or liver disease and have to limit fluids, talk with your doctor before you increase the amount of fluids you drink. · Exercise regularly to improve your strength, muscle tone, and balance. Walk if you can. Swimming may be a good choice if you cannot walk easily. · Have your vision and hearing checked each year or any time you notice a change. If you have trouble seeing and hearing, you might not be able to avoid objects and could lose your balance. · Know the side effects of the medicines you take.  Ask your doctor or pharmacist whether the medicines you take can affect your balance. Sleeping pills or sedatives can affect your balance. · Limit the amount of alcohol you drink. Alcohol can impair your balance and other senses. · Ask your doctor whether calluses or corns on your feet need to be removed. If you wear loose-fitting shoes because of calluses or corns, you can lose your balance and fall. · Talk to your doctor if you have numbness in your feet. Preventing falls at home · Remove raised doorway thresholds, throw rugs, and clutter. Repair loose carpet or raised areas in the floor. · Move furniture and electrical cords to keep them out of walking paths. · Use nonskid floor wax, and wipe up spills right away, especially on ceramic tile floors. · If you use a walker or cane, put rubber tips on it. If you use crutches, clean the bottoms of them regularly with an abrasive pad, such as steel wool. · Keep your house well lit, especially Marya Fothergill, and outside walkways. Use night-lights in areas such as hallways and bathrooms. Add extra light switches or use remote switches (such as switches that go on or off when you clap your hands) to make it easier to turn lights on if you have to get up during the night. · Install sturdy handrails on stairways. · Move items in your cabinets so that the things you use a lot are on the lower shelves (about waist level). · Keep a cordless phone and a flashlight with new batteries by your bed. If possible, put a phone in each of the main rooms of your house, or carry a cell phone in case you fall and cannot reach a phone. Or, you can wear a device around your neck or wrist. You push a button that sends a signal for help. · Wear low-heeled shoes that fit well and give your feet good support. Use footwear with nonskid soles. Check the heels and soles of your shoes for wear. Repair or replace worn heels or soles. · Do not wear socks without shoes on wood floors. · Walk on the grass when the sidewalks are slippery. If you live in an area that gets snow and ice in the winter, sprinkle salt on slippery steps and sidewalks. Preventing falls in the bath · Install grab bars and nonskid mats inside and outside your shower or tub and near the toilet and sinks. · Use shower chairs and bath benches. · Use a hand-held shower head that will allow you to sit while showering. · Get into a tub or shower by putting the weaker leg in first. Get out of a tub or shower with your strong side first. 
· Repair loose toilet seats and consider installing a raised toilet seat to make getting on and off the toilet easier. · Keep your bathroom door unlocked while you are in the shower. Where can you learn more? Go to http://tiffany-maryanne.info/. Enter 0476 [id number] in the search box to learn more about \"Preventing Falls: Care Instructions. \" Current as of: March 16, 2018 Content Version: 11.8 © 6897-1676 Healthwise, Incorporated. Care instructions adapted under license by [employer] (which disclaims liability or warranty for this information). If you have questions about a medical condition or this instruction, always ask your healthcare professional. [address] any warranty or liability for your use of this information.

## 2019-02-20 NOTE — PROGRESS NOTES
Cathy Hernandez is a 66 y.o. female Chief Complaint Patient presents with  Complete Physical  
 
 
 
1. Have you been to the ER, urgent care clinic since your last visit? Hospitalized since your last visit? No 
 
2. Have you seen or consulted any other health care providers outside of the [facility] since your last visit? Include any pap smears or colon screening.   No

## 2019-02-20 NOTE — PROGRESS NOTES
This is the Subsequent Medicare Annual Wellness Exam, performed 12 months or more after the Initial AWV or the last Subsequent AWV I have reviewed the patient's medical history in detail and updated the computerized patient record. History Annual Physical 
Last Physical: > 1 year Female Health 
- patient is followed by Dr. Lincoln Amezcua, who she states is managing her female health Vaccine: - Flu: declines today 
- Shingrix: declines today Tb: denies hx of smoking Diet: patient reports that she tries to eat healthy, but it is difficult sometimes Chronic Ear Pain Location: left ear Duration: started  Symptoms: patient reports that she will get a sharp pain on the external ear canal 
Character: sharp Severity: 10/10 Timin minutes Frequency: occurs 1-2x per month Medicine: patient has been taking ear drops that were prescrbied in  ( ). Auralgan (Antipyrine and Benzocaine Ottic Solution) AS: denies any problems with hearing or problems with balance Specialist: patient reports the she went to ENT in the past, who stated that she had damage to her ear from removal of an insect that occurred . She states that she was told to continue the ear drops as needed. Review of Systems Constitutional: Negative for appetite change. Negative for activity change, chills, diaphoresis and fatigue. HENT: denies any vision changes, but does endorse having chronic left ear pain 
Cardio: denies any CP or palpitations Resp: denies any sob or wheezing Past Medical History:  
Diagnosis Date  Allergic rhinitis  Carpal tunnel syndrome  Facial pain 2018  Fibromyalgia  Generalized osteoarthritis 2018  GERD (gastroesophageal reflux disease)  Hyperlipemia  Hyperlipidemia 2018  Hypertension  IBS (irritable bowel syndrome)  Lower back pain 2018  Osteoarthritis  Osteoporosis 2018 Past Surgical History: Procedure Laterality Date  HX COLONOSCOPY  01/2010  HX HYSTERECTOMY  HX OTHER SURGICAL    
 lumbar disc surgery  HX SALPINGO-OOPHORECTOMY Current Outpatient Medications Medication Sig Dispense Refill  benzocaine 20 % liqd Instill 4 to 5 drops of otic solution into external ear canal of affected ear(s), repeat every 1 to 2 hours if necessary 9 mL 0  
 [START ON 4/1/2019] HYDROcodone-acetaminophen (NORCO) 5-325 mg per tablet Take 1 Tab by mouth every twelve (12) hours as needed for Pain. Max Daily Amount: 2 Tabs. 45 Tab 0  
 lisinopril (PRINIVIL, ZESTRIL) 5 mg tablet Take 1 Tab by mouth daily. 90 Tab 1  
 gabapentin (NEURONTIN) 100 mg capsule TAKE 2 CAPSULES EVERY DAY  IN  MID  AFTERNOON  FOR  RESTLESS LEGS AT NIGHT 180 Cap 1  
 fluticasone (FLONASE) 50 mcg/actuation nasal spray 1 [place] by Both Nostrils route daily.  meloxicam (MOBIC) 15 mg tablet Take 15 mg by mouth daily.  traZODone (DESYREL) 50 mg tablet Take  by mouth nightly.  alendronate (FOSAMAX) 70 mg tablet Take  by mouth every seven (7) days. Allergies Allergen Reactions  Alendronate Unknown (comments)  Doxycycline Unknown (comments)  Erythromycin Unknown (comments)  Nsaids (Non-Steroidal Anti-Inflammatory Drug) Unknown (comments)  Pcn [Penicillins] Unknown (comments) Family History Problem Relation Age of Onset  Stroke Mother  Heart Attack Father  Hypertension Brother  Inflammatory Bowel Dz Brother Social History Tobacco Use  Smoking status: Never Smoker  Smokeless tobacco: Never Used Substance Use Topics  Alcohol use: No  
  Frequency: Never Patient Active Problem List  
Diagnosis Code  Body mass index (BMI) 20.0-20.9, adult Z68.20  
 HTN (hypertension) I10  
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  
 
3 most recent PHQ Screens 2/20/2019 Little interest or pleasure in doing things Not at all Feeling down, depressed, irritable, or hopeless Not at all Total Score PHQ 2 0 Alcohol Risk Factor Screening: You do not drink alcohol or very rarely. Functional Ability and Level of Safety:  
Hearing Loss Hearing is good. Activities of Daily Living The home contains: good lighting and safety equipment Patient does total self care Fall Risk Fall Risk Assessment, last 12 mths 2/20/2019 Able to walk? Yes Fall in past 12 months? No  
 
 
Abuse Screen Patient is not abused Cognitive Screening Evaluation of Cognitive Function: 
Has your family/caregiver stated any concerns about your memory: yes Normal 
 
Patient Care Team  
Patient Care Team: 
Dorie Beavers MD as PCP - [facility]) Claudeen Saber., MD as Consulting Provider (Gastroenterology) Kimberlyn Larson MD as Consulting Provider (Neurology) Faiza Powers MD as Gynecologist (Obstetrics & Gynecology) Lovely Gregorio MD as Consulting Provider (Ophthalmology) Samson Rader MD as Consulting Provider (Orthopedic Surgery) Elizabeth Martin MD as Consulting Provider (Rheumatology) Visit Vitals /84 (BP 1 Location: Left arm, BP Patient Position: Sitting) Pulse 77 Temp 98 °F (36.7 °C) (Oral) Resp 16 Ht 5' 3\" (1.6 m) Wt 114 lb (51.7 kg) SpO2 97% BMI 20.19 kg/m² Physical Exam  
Const: NAD, sitting comfortably in chair Eyes:  EOMI, MMM, PERRLA 
ENT: gross hearing intact, left ear with 5mm area of keratinized epithelium along inferior aspect of distal outer canal, septum midline, nares patent, oropharynx moist without exudate Cardiovascular: no m/g/r; normal rhythym, regular rate, radial pulse intact blt Resp: no wheezing or  crackles, LCAB; no accessory muscle use Abd: normoactive bowel sounds, no TTP Assessment/Plan Education and counseling provided: 
Are appropriate based on today's review and evaluation Diagnoses and all orders for this visit: 
 
1. Ear pain, left: chronic left ear pain that patient has been managing with  (exp date of ) ear drops. Will refer to ENT for further evaluation. Will prescribe anesthetic ear drops for patient until she is able to evaluated by ENT. -     REFERRAL TO ENT-OTOLARYNGOLOGY 
-     benzocaine otic drops 2. Medicare annual wellness visit, subsequent: Reviewed health maintenance with patient. She declines flu vaccine and shingrix vaccine today. -     CBC WITH AUTOMATED DIFF 
-     METABOLIC PANEL, COMPREHENSIVE 
-     URINALYSIS W/ RFLX MICROSCOPIC 
-     LIPID PANEL 3. Age-related osteoporosis without current pathological fracture: patient intolerant of Alendronate. Patient reports having Dexa within last 5 years. Will obtain records. Will check Vitamin D with today's labs. -     VITAMIN D, 25 HYDROXY 4. Screening for alcoholism: negative -     [place] ANNUAL ALCOHOL SCREEN 15 MIN 
 
5. Screening for depression: Negative -     [facility] Health Maintenance Due Topic Date Due  
 DTaP/Tdap/Td series (1 - Tdap) 10/01/1961  Shingrix Vaccine Age 50> (1 of 2) 10/01/1990  Bone Densitometry (Dexa) Screening  10/01/2005  Influenza Age 5 to Adult  2018

## 2019-02-22 LAB
25(OH)D3+25(OH)D2 SERPL-MCNC: 39.7 NG/ML (ref 30–100)
ALBUMIN SERPL-MCNC: 4.5 G/DL (ref 3.5–4.8)
ALBUMIN/GLOB SERPL: 1.7 {RATIO} (ref 1.2–2.2)
ALP SERPL-CCNC: 58 IU/L (ref 39–117)
ALT SERPL-CCNC: 9 IU/L (ref 0–32)
APPEARANCE UR: CLEAR
AST SERPL-CCNC: 18 IU/L (ref 0–40)
BACTERIA #/AREA URNS HPF: NORMAL /[HPF]
BASOPHILS # BLD AUTO: 0 X10E3/UL (ref 0–0.2)
BASOPHILS NFR BLD AUTO: 1 %
BILIRUB SERPL-MCNC: 0.6 MG/DL (ref 0–1.2)
BILIRUB UR QL STRIP: NEGATIVE
BUN SERPL-MCNC: 10 MG/DL (ref 8–27)
BUN/CREAT SERPL: 11 (ref 12–28)
CALCIUM SERPL-MCNC: 9.8 MG/DL (ref 8.7–10.3)
CASTS URNS QL MICRO: NORMAL /LPF
CHLORIDE SERPL-SCNC: 100 MMOL/L (ref 96–106)
CHOLEST SERPL-MCNC: 236 MG/DL (ref 100–199)
CO2 SERPL-SCNC: 25 MMOL/L (ref 20–29)
COLOR UR: YELLOW
CREAT SERPL-MCNC: 0.91 MG/DL (ref 0.57–1)
EOSINOPHIL # BLD AUTO: 0.1 X10E3/UL (ref 0–0.4)
EOSINOPHIL NFR BLD AUTO: 3 %
EPI CELLS #/AREA URNS HPF: NORMAL /HPF
ERYTHROCYTE [DISTWIDTH] IN BLOOD BY AUTOMATED COUNT: 13.4 % (ref 12.3–15.4)
GLOBULIN SER CALC-MCNC: 2.6 G/DL (ref 1.5–4.5)
GLUCOSE SERPL-MCNC: 82 MG/DL (ref 65–99)
GLUCOSE UR QL: NEGATIVE
HCT VFR BLD AUTO: 38.6 % (ref 34–46.6)
HDLC SERPL-MCNC: 88 MG/DL
HGB BLD-MCNC: 12.8 G/DL (ref 11.1–15.9)
HGB UR QL STRIP: ABNORMAL
IMM GRANULOCYTES # BLD AUTO: 0 X10E3/UL (ref 0–0.1)
IMM GRANULOCYTES NFR BLD AUTO: 0 %
KETONES UR QL STRIP: NEGATIVE
LDLC SERPL CALC-MCNC: 124 MG/DL (ref 0–99)
LEUKOCYTE ESTERASE UR QL STRIP: ABNORMAL
LYMPHOCYTES # BLD AUTO: 1.4 X10E3/UL (ref 0.7–3.1)
LYMPHOCYTES NFR BLD AUTO: 30 %
MCH RBC QN AUTO: 31.4 PG (ref 26.6–33)
MCHC RBC AUTO-ENTMCNC: 33.2 G/DL (ref 31.5–35.7)
MCV RBC AUTO: 95 FL (ref 79–97)
MICRO URNS: ABNORMAL
MONOCYTES # BLD AUTO: 0.4 X10E3/UL (ref 0.1–0.9)
MONOCYTES NFR BLD AUTO: 9 %
MUCOUS THREADS URNS QL MICRO: PRESENT
NEUTROPHILS # BLD AUTO: 2.6 X10E3/UL (ref 1.4–7)
NEUTROPHILS NFR BLD AUTO: 57 %
NITRITE UR QL STRIP: NEGATIVE
PH UR STRIP: 6.5 [PH] (ref 5–7.5)
PLATELET # BLD AUTO: 167 X10E3/UL (ref 150–379)
POTASSIUM SERPL-SCNC: 4.8 MMOL/L (ref 3.5–5.2)
PROT SERPL-MCNC: 7.1 G/DL (ref 6–8.5)
PROT UR QL STRIP: NEGATIVE
RBC # BLD AUTO: 4.08 X10E6/UL (ref 3.77–5.28)
RBC #/AREA URNS HPF: NORMAL /HPF
SODIUM SERPL-SCNC: 141 MMOL/L (ref 134–144)
SP GR UR: 1.01 (ref 1–1.03)
TRIGL SERPL-MCNC: 118 MG/DL (ref 0–149)
UROBILINOGEN UR STRIP-MCNC: 0.2 MG/DL (ref 0.2–1)
VLDLC SERPL CALC-MCNC: 24 MG/DL (ref 5–40)
WBC # BLD AUTO: 4.6 X10E3/UL (ref 3.4–10.8)
WBC #/AREA URNS HPF: NORMAL /HPF

## 2019-02-28 ENCOUNTER — TELEPHONE (OUTPATIENT)
Dept: FAMILY MEDICINE CLINIC | Age: 79
End: 2019-02-28

## 2019-02-28 NOTE — TELEPHONE ENCOUNTER
Pt called stating she got a referral to [facility] and made appt same day but [facility] on [address] keeps calling her to make an appt and that you wanted her to go there. Was the information sent to the wrong facility or does it matter which facility she goes to?    --------  02/28/19  1:32 PM    The patient may go to whom ever she would like. Please call to let her know.      Bernice Gonzalez MD

## 2019-03-04 ENCOUNTER — TELEPHONE (OUTPATIENT)
Dept: FAMILY MEDICINE CLINIC | Age: 79
End: 2019-03-04

## 2019-03-04 DIAGNOSIS — R82.90 ABNORMAL FINDING ON URINALYSIS: Primary | ICD-10-CM

## 2019-03-10 LAB — BACTERIA UR CULT: NORMAL

## 2019-03-14 ENCOUNTER — OFFICE VISIT (OUTPATIENT)
Dept: FAMILY MEDICINE CLINIC | Age: 79
End: 2019-03-14

## 2019-03-14 VITALS
OXYGEN SATURATION: 98 % | BODY MASS INDEX: 19.67 KG/M2 | HEIGHT: 63 IN | HEART RATE: 76 BPM | SYSTOLIC BLOOD PRESSURE: 183 MMHG | WEIGHT: 111 LBS | RESPIRATION RATE: 18 BRPM | DIASTOLIC BLOOD PRESSURE: 81 MMHG | TEMPERATURE: 98.1 F

## 2019-03-14 DIAGNOSIS — R19.7 DIARRHEA, UNSPECIFIED TYPE: ICD-10-CM

## 2019-03-14 DIAGNOSIS — R51.9 LEFT TEMPORAL HEADACHE: Primary | ICD-10-CM

## 2019-03-14 DIAGNOSIS — I10 ESSENTIAL HYPERTENSION: ICD-10-CM

## 2019-03-14 NOTE — PROGRESS NOTES
CC: HTN, headache, diarrhea    History of Present Illness:  Benny Obrien is a 66 y.o. female. She notes recently increased blood pressure. Home log reads 120s/50s-80s. She has been taking her lisinopril 5mg bid. She had an episode of diarrhea a couple weeks ago at which time she felt dizzy and her BP dropped to 44Z systolic. She had one episode of high BP to 907 systolic when it was checked while her  was in the ER. She had one episode of high BP at home to 808Y systolic. She says she has had an intermittent left-sided headache; she points to the temporal and parietal regions. She rates the pain 2/10. She says it hurts when her BP is high. It has been going on for two weeks; she cannot tell me how long it lasts when she gets it. She denies weakness, numbness/tingling, speech difficulty, balance problems, problems walking, vision changes. She denies pain with chewing. She denies eye pain. She denies hearing loss and ear pain. She notes intermittent episodes of diarrhea for \"years\" that has been worked up previously. She has seen GI in the past and had colonoscopy and CT. Most recent GI visit about 5 years ago. No known diagnosis. She had an episode about two weeks ago. She says her face gets flushed before an episode. It is not associated with food. Last episode before the one two weeks ago was about one month ago. She denies fevers, chills, vomiting. She does get abdominal pain before the diarrhea which resolves after the diarrhea. The episode is unchanged from prior episodes. She denies blood and melena.          Allergies   Allergen Reactions    Alendronate Unknown (comments)    Doxycycline Unknown (comments)    Erythromycin Unknown (comments)    Nsaids (Non-Steroidal Anti-Inflammatory Drug) Unknown (comments)    Pcn [Penicillins] Unknown (comments)     Current Outpatient Medications   Medication Sig Dispense Refill    benzocaine 20 % liqd Instill 4 to 5 drops of otic solution into external ear canal of affected ear(s), repeat every 1 to 2 hours if necessary 9 mL 0    [START ON 4/1/2019] HYDROcodone-acetaminophen (NORCO) 5-325 mg per tablet Take 1 Tab by mouth every twelve (12) hours as needed for Pain. Max Daily Amount: 2 Tabs. 45 Tab 0    lisinopril (PRINIVIL, ZESTRIL) 5 mg tablet Take 1 Tab by mouth daily. 90 Tab 1    gabapentin (NEURONTIN) 100 mg capsule TAKE 2 CAPSULES EVERY DAY  IN  MID  AFTERNOON  FOR  RESTLESS LEGS AT NIGHT 180 Cap 1    fluticasone (FLONASE) 50 mcg/actuation nasal spray 1 [place] by Both Nostrils route daily.  meloxicam (MOBIC) 15 mg tablet Take 15 mg by mouth daily.  traZODone (DESYREL) 50 mg tablet Take  by mouth nightly.  alendronate (FOSAMAX) 70 mg tablet Take  by mouth every seven (7) days.        Patient Active Problem List   Diagnosis Code    Body mass index (BMI) 20.0-20.9, adult Z68.20    HTN (hypertension) I10    Facial pain R51    Generalized osteoarthritis M15.9    Hyperlipidemia E78.5    Lower back pain M54.5    Osteoporosis M81.0    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       Past Medical History:   Diagnosis Date    Allergic rhinitis     Carpal tunnel syndrome     Facial pain 7/18/2018    Fibromyalgia     Generalized osteoarthritis 7/18/2018    GERD (gastroesophageal reflux disease)     Hyperlipemia     Hyperlipidemia 7/18/2018    Hypertension     IBS (irritable bowel syndrome)     Lower back pain 7/18/2018    Osteoarthritis     Osteoporosis 7/18/2018     Past Surgical History:   Procedure Laterality Date    HX COLONOSCOPY  01/2010    HX HYSTERECTOMY      HX OTHER SURGICAL      lumbar disc surgery    HX SALPINGO-OOPHORECTOMY       Social History     Socioeconomic History    Marital status:      Spouse name: Not on file    Number of children: Not on file    Years of education: Not on file    Highest education level: Not on file   Tobacco Use    Smoking status: Never Smoker    Smokeless tobacco: Never Used   Substance and Sexual Activity    Alcohol use: No     Frequency: Never    Drug use: No    Sexual activity: Yes     Family History   Problem Relation Age of Onset    Stroke Mother     Heart Attack Father     Hypertension Brother     Inflammatory Bowel Dz Brother          Review of Systems   Constitutional: Negative for chills, fever and weight loss. HENT: Negative for ear pain, hearing loss and tinnitus. Eyes: Negative for blurred vision, double vision, photophobia and pain. Respiratory: Negative for shortness of breath. Cardiovascular: Negative for chest pain. Gastrointestinal: Positive for abdominal pain, diarrhea and nausea. Negative for blood in stool, melena and vomiting. Genitourinary: Negative for dysuria, frequency, hematuria and urgency. Skin: Negative for rash. Neurological: Positive for dizziness and headaches. Negative for tingling, tremors, sensory change, speech change and focal weakness. Physical Exam:  Visit Vitals  /81 (BP 1 Location: Left arm, BP Patient Position: Sitting)   Pulse 76   Temp 98.1 °F (36.7 °C) (Oral)   Resp 18   Ht 5' 3\" (1.6 m)   Wt 111 lb (50.3 kg)   SpO2 98%   BMI 19.66 kg/m²     Physical Exam   Constitutional: She is oriented to person, place, and time. No distress. HENT:   Head: Normocephalic and atraumatic. Bilateral temporal arteries nontender   Cardiovascular: Normal rate, regular rhythm and normal heart sounds. Pulmonary/Chest: Effort normal. No respiratory distress. Abdominal: Soft. Bowel sounds are normal. She exhibits no distension and no mass. There is no tenderness. There is no rebound and no guarding. Musculoskeletal: She exhibits no edema. Neurological: She is alert and oriented to person, place, and time. She has normal strength and normal reflexes. She displays no tremor. No cranial nerve deficit or sensory deficit. She exhibits normal muscle tone.  Coordination and gait normal.   Reflex Scores:       Tricep reflexes are 2+ on the right side and 2+ on the left side. Bicep reflexes are 2+ on the right side and 2+ on the left side. Brachioradialis reflexes are 2+ on the right side and 2+ on the left side. Patellar reflexes are 2+ on the right side and 2+ on the left side. Achilles reflexes are 2+ on the right side and 2+ on the left side. Skin: Skin is warm and dry. Assessment/Plan:  1. Left temporal headache  May be related to elevated BP though her home log shows mostly normal numbers. She will bring in her home BP cuff to be tested with nurses and follow up with me in two weeks. Check ESR to rule out temporal arteritis. If negative and home BP cuff is accurate indicating BP mostly normal, will need to check head CT.   - SED RATE (ESR)    2. Diarrhea, unspecified type  Reportedly prior negative work up. With history of flushing and diarrhea, will check urine 5-HIAA to check for carcinoid. - 5-HIAA, UR    3. Essential hypertension  Elevated today but mostly normal home readings. With history of one episode of systolic in the 64C, I don't want to drop her BP too much. She will bring in home BP cuff to be checked with nurses to assess for accuracy. Continue home BP checks. Follow up in two weeks. If home BP cuff inaccurately low and BP is elevated, will need to increase BP medicine. Follow-up Disposition:  Return in about 2 weeks (around 3/28/2019) for bring home BP cuff to get checked with nurses. Diagnoses, tests, plan, and follow up discussed with patient. I have given to and reviewed with the patient the after visit summary. Patient expressed agreement and understanding. All questions were answered. Discussed with Dr. Eros Rapp.

## 2019-03-14 NOTE — PROGRESS NOTES
1. Have you been to the ER, urgent care clinic since your last visit? Hospitalized since your last visit? No    2. Have you seen or consulted any other health care providers outside of the [address] [place] [address] since your last visit? Include any pap smears or colon screening. No     Patient states that she has been taking lisinopril 5 mg BID.

## 2019-03-14 NOTE — PATIENT INSTRUCTIONS
Home Blood Pressure Test: About This Test  What is it? A home blood pressure test allows you to keep track of your blood pressure at home. Blood pressure is a measure of the force of blood against the walls of your arteries. Blood pressure readings include two numbers, such as 130/80 (say \"130 over 80\"). The first number is the systolic pressure. The second number is the diastolic pressure. Why is this test done? You may do this test at home to:  · Find out if you have high blood pressure. · Track your blood pressure if you have high blood pressure. · Track how well medicine is working to reduce high blood pressure. · Check how lifestyle changes, such as weight loss and exercise, are affecting blood pressure. How can you prepare for the test?  · Do not use caffeine, tobacco, or medicines known to raise blood pressure (such as nasal decongestant sprays) for at least 30 minutes before taking your blood pressure. · Do not exercise for at least 30 minutes before taking your blood pressure. What happens before the test?  Take your blood pressure while you feel comfortable and relaxed. Sit quietly with both feet on the floor for at least 5 minutes before the test.  What happens during the test?  · Sit with your arm slightly bent and resting on a table so that your upper arm is at the same level as your heart. · Roll up your sleeve or take off your shirt to expose your upper arm. · Wrap the blood pressure cuff around your upper arm so that the lower edge of the cuff is about 1 inch above the bend of your elbow. Proceed with the following steps depending on if you are using an automatic or manual pressure monitor. Automatic blood pressure monitors  · Press the on/off button on the automatic monitor and wait until the ready-to-measure \"heart\" symbol appears next to zero in the display window. · Press the start button. The cuff will inflate and deflate by itself.   · Your blood pressure numbers will appear on the screen. · Write your numbers in your log book, along with the date and time. Manual blood pressure monitors  · Place the earpieces of a stethoscope in your ears, and place the bell of the stethoscope over the artery, just below the cuff. · Close the valve on the rubber inflating bulb. · Squeeze the bulb rapidly with your opposite hand to inflate the cuff until the dial or column of mercury reads about 30 mm Hg higher than your usual systolic pressure. If you do not know your usual pressure, inflate the cuff to 210 mm Hg or until the pulse at your wrist disappears. · Open the pressure valve just slightly by twisting or pressing the valve on the bulb. · As you watch the pressure slowly fall, note the level on the dial at which you first start to hear a pulsing or tapping sound through the stethoscope. This is your systolic blood pressure. · Continue letting the air out slowly. The sounds will become muffled and will finally disappear. Note the pressure when the sounds completely disappear. This is your diastolic blood pressure. Let out all the remaining air. · Write your numbers in your log book, along with the date and time. What else should you know about the test?  It is more accurate to take the average of several readings made throughout the day than to rely on a single reading. It's normal for blood pressure to go up and down throughout the day. Follow-up care is a key part of your treatment and safety. Be sure to make and go to all appointments, and call your doctor if you are having problems. It's also a good idea to keep a list of the medicines you take. Where can you learn more? Go to http://tiffany-maryanne.info/. Enter C427 in the search box to learn more about \"Home Blood Pressure Test: About This Test.\"  Current as of: July 22, 2018  Content Version: 11.9  © 4607-0033 [employer].  Care instructions adapted under license by [employer] (which disclaims liability or warranty for this information). If you have questions about a medical condition or this instruction, always ask your healthcare professional. [address] any warranty or liability for your use of this information.

## 2019-03-15 LAB — ERYTHROCYTE [SEDIMENTATION RATE] IN BLOOD BY WESTERGREN METHOD: 3 MM/HR (ref 0–40)

## 2019-03-18 ENCOUNTER — CLINICAL SUPPORT (OUTPATIENT)
Dept: FAMILY MEDICINE CLINIC | Age: 79
End: 2019-03-18

## 2019-03-18 VITALS
HEIGHT: 63 IN | BODY MASS INDEX: 19.67 KG/M2 | SYSTOLIC BLOOD PRESSURE: 151 MMHG | RESPIRATION RATE: 16 BRPM | HEART RATE: 92 BPM | DIASTOLIC BLOOD PRESSURE: 78 MMHG | OXYGEN SATURATION: 97 % | WEIGHT: 111 LBS

## 2019-03-18 DIAGNOSIS — I10 HYPERTENSION, UNSPECIFIED TYPE: Primary | ICD-10-CM

## 2019-03-22 ENCOUNTER — TELEPHONE (OUTPATIENT)
Dept: FAMILY MEDICINE CLINIC | Age: 79
End: 2019-03-22

## 2019-03-22 DIAGNOSIS — R51.9 UNILATERAL HEADACHE: Primary | ICD-10-CM

## 2019-03-22 LAB
5OH-INDOLEACETATE 24H UR-MCNC: 1.8 MG/L
5OH-INDOLEACETATE 24H UR-MRATE: 3.2 MG/24 HR (ref 0–14.9)

## 2019-03-22 NOTE — TELEPHONE ENCOUNTER
She called back wondering if her headache could be from elevated blood pressure. Her BP at last appt was 183/81 and at nursing visit was 151/78. She had had an episode of low BP to systolic of 47C at home prior to her last visit; her home BP log showed mostly normal home readings. She has follow up next week. Encouraged her to continue to monitor her home BP and explained with new onset unilateral headaches in her age it is prudent to check MRI. She understands and agrees.

## 2019-03-22 NOTE — TELEPHONE ENCOUNTER
Spoke with patient regarding normal lab results. She still has intermittent unilateral left-sided headache. She now tells me it has been there intermittently about two years but more frequent within the past few weeks. No other neurological signs or symptoms. Will order MRI brain to evaluate. ER precautions given. She understands and agrees.

## 2019-03-28 ENCOUNTER — OFFICE VISIT (OUTPATIENT)
Dept: FAMILY MEDICINE CLINIC | Age: 79
End: 2019-03-28

## 2019-03-28 VITALS
TEMPERATURE: 98.3 F | BODY MASS INDEX: 20.02 KG/M2 | OXYGEN SATURATION: 98 % | RESPIRATION RATE: 16 BRPM | SYSTOLIC BLOOD PRESSURE: 168 MMHG | DIASTOLIC BLOOD PRESSURE: 69 MMHG | HEART RATE: 73 BPM | WEIGHT: 113 LBS | HEIGHT: 63 IN

## 2019-03-28 DIAGNOSIS — R51.9 NONINTRACTABLE HEADACHE, UNSPECIFIED CHRONICITY PATTERN, UNSPECIFIED HEADACHE TYPE: ICD-10-CM

## 2019-03-28 DIAGNOSIS — I10 ESSENTIAL HYPERTENSION: Primary | ICD-10-CM

## 2019-03-28 RX ORDER — LISINOPRIL 10 MG/1
10 TABLET ORAL DAILY
Qty: 90 TAB | Refills: 1 | Status: SHIPPED | OUTPATIENT
Start: 2019-03-28 | End: 2019-04-25 | Stop reason: SDUPTHER

## 2019-03-28 NOTE — PROGRESS NOTES
Chief Complaint   Patient presents with    Hypertension     FOLLOW UP HYPERTENSION   TODAY'S 172/72        Health Maintenance Due   Topic    DTaP/Tdap/Td series (1 - Tdap)    Shingrix Vaccine Age 49> (1 of 2)    Influenza Age 5 to Adult        Wt Readings from Last 3 Encounters:   03/28/19 113 lb (51.3 kg)   03/18/19 111 lb (50.3 kg)   03/14/19 111 lb (50.3 kg)     Temp Readings from Last 3 Encounters:   03/28/19 98.3 °F (36.8 °C)   03/14/19 98.1 °F (36.7 °C) (Oral)   02/20/19 98 °F (36.7 °C) (Oral)     BP Readings from Last 3 Encounters:   03/28/19 172/71   03/18/19 151/78   03/14/19 183/81     Pulse Readings from Last 3 Encounters:   03/28/19 73   03/18/19 92   03/14/19 76         Learning Assessment:  :     No flowsheet data found. Depression Screening:  :     3 most recent PHQ Screens 3/28/2019   Little interest or pleasure in doing things Not at all   Feeling down, depressed, irritable, or hopeless Not at all   Total Score PHQ 2 0       Fall Risk Assessment:  :     Fall Risk Assessment, last 12 mths 3/28/2019   Able to walk? Yes   Fall in past 12 months? No       Abuse Screening:  :     No flowsheet data found. Coordination of Care Questionnaire:  :     1) Have you been to an emergency room, urgent care clinic since your last visit? NO  Hospitalized since your last visit? NO             2) Have you seen or consulted any other health care providers outside of [address] [place] [address] since your last visit? NO      3) Do you have an Advance Directive on file? YES    Patient is accompanied by self I have received verbal consent from Leona Alexander to discuss any/all medical information while they are present in the room.

## 2019-03-28 NOTE — PROGRESS NOTES
CC: HTN    History of Present Illness:  Benny Obrien is a 66 y.o. female. Was last seen on 3/14/19 for reportedly increased blood pressure. Home BP readings have been 140s - 170s/80s-90s. She takes lisinopril 5mg daily. She denies further dizzy episodes. She admits to headaches with high blood pressure. She denies weakness, numbness/tingling, speech difficulty, balance problems, problems walking, vision changes. She denies pain with chewing. She denies eye pain. She denies hearing loss and ear pain. ESR was normal. Brain MRI was ordered and is scheduled for 4/4/19. Allergies   Allergen Reactions    Alendronate Unknown (comments)    Doxycycline Unknown (comments)    Erythromycin Unknown (comments)    Nsaids (Non-Steroidal Anti-Inflammatory Drug) Unknown (comments)    Pcn [Penicillins] Unknown (comments)     Current Outpatient Medications   Medication Sig Dispense Refill    lisinopril (PRINIVIL, ZESTRIL) 5 mg tablet Take 1 Tab by mouth daily. 90 Tab 1    benzocaine 20 % liqd Instill 4 to 5 drops of otic solution into external ear canal of affected ear(s), repeat every 1 to 2 hours if necessary 9 mL 0    [START ON 4/1/2019] HYDROcodone-acetaminophen (NORCO) 5-325 mg per tablet Take 1 Tab by mouth every twelve (12) hours as needed for Pain. Max Daily Amount: 2 Tabs. 45 Tab 0    gabapentin (NEURONTIN) 100 mg capsule TAKE 2 CAPSULES EVERY DAY  IN  MID  AFTERNOON  FOR  RESTLESS LEGS AT NIGHT 180 Cap 1    fluticasone (FLONASE) 50 mcg/actuation nasal spray 1 [place] by Both Nostrils route daily.  meloxicam (MOBIC) 15 mg tablet Take 15 mg by mouth daily.  traZODone (DESYREL) 50 mg tablet Take  by mouth nightly.  alendronate (FOSAMAX) 70 mg tablet Take  by mouth every seven (7) days.        Patient Active Problem List   Diagnosis Code    Body mass index (BMI) 20.0-20.9, adult Z68.20    HTN (hypertension) I10    Facial pain R51    Generalized osteoarthritis M15.9    Hyperlipidemia E78.5    Lower back pain M54.5    Osteoporosis M81.0    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       Past Medical History:   Diagnosis Date    Allergic rhinitis     Carpal tunnel syndrome     Facial pain 7/18/2018    Fibromyalgia     Generalized osteoarthritis 7/18/2018    GERD (gastroesophageal reflux disease)     Hyperlipemia     Hyperlipidemia 7/18/2018    Hypertension     IBS (irritable bowel syndrome)     Lower back pain 7/18/2018    Osteoarthritis     Osteoporosis 7/18/2018     Past Surgical History:   Procedure Laterality Date    HX COLONOSCOPY  01/2010    HX HYSTERECTOMY      HX OTHER SURGICAL      lumbar disc surgery    HX SALPINGO-OOPHORECTOMY       Social History     Socioeconomic History    Marital status:      Spouse name: Not on file    Number of children: Not on file    Years of education: Not on file    Highest education level: Not on file   Tobacco Use    Smoking status: Never Smoker    Smokeless tobacco: Never Used   Substance and Sexual Activity    Alcohol use: No     Frequency: Never    Drug use: No    Sexual activity: Yes     Family History   Problem Relation Age of Onset    Stroke Mother     Heart Attack Father     Hypertension Brother     Inflammatory Bowel Dz Brother          Review of Systems   Constitutional: Negative for chills and fever. Eyes: Negative for blurred vision and double vision. Respiratory: Negative for shortness of breath. Cardiovascular: Negative for chest pain, palpitations and leg swelling. Neurological: Positive for headaches. Negative for dizziness, tingling, tremors, sensory change, speech change, focal weakness and weakness. Physical Exam:  Visit Vitals  /69   Pulse 73   Temp 98.3 °F (36.8 °C)   Resp 16   Ht 5' 3\" (1.6 m)   Wt 113 lb (51.3 kg)   SpO2 98%   BMI 20.02 kg/m²     Physical Exam   Constitutional: She is oriented to person, place, and time. No distress.    Cardiovascular: Normal rate, regular rhythm and normal heart sounds. Pulmonary/Chest: Effort normal and breath sounds normal.   Musculoskeletal: She exhibits no edema. Neurological: She is alert and oriented to person, place, and time. She has normal reflexes. No cranial nerve deficit. She exhibits normal muscle tone. Coordination normal.         Assessment/Plan:  1. Essential hypertension  Uncontrolled. Increase lisinopril. Continue home BP checks. - lisinopril (PRINIVIL, ZESTRIL) 10 mg tablet; Take 1 Tab by mouth daily. Dispense: 90 Tab; Refill: 1    2. Headaches  Stable. Related to HTN? MRI brain pending. Follow-up and Dispositions    · Return in about 1 month (around 4/25/2019). Diagnoses, tests, plan, and follow up discussed with patient. I have given to and reviewed with the patient the after visit summary. I have reviewed medication side effects and precautions. Patient expressed agreement and understanding. All questions were answered. Discussed with Dr. Jordan Hill.

## 2019-03-28 NOTE — PATIENT INSTRUCTIONS
Lisinopril (By mouth)   Lisinopril (lye-SIN-oh-pril)  Treats high blood pressure and heart failure. Also given to reduce the risk of death after a heart attack. This medicine is an ACE inhibitor. Brand Name(s): Prinivil, Qbrelis, Zestril   There may be other brand names for this medicine. When This Medicine Should Not Be Used: This medicine is not right for everyone. Do not use it if you had an allergic reaction to lisinopril or another ACE inhibitor, or if you are pregnant. How to Use This Medicine:   Liquid, Tablet  · Take your medicine as directed. Your dose may need to be changed several times to find what works best for you. · Oral liquid: Measure the oral liquid medicine with a marked measuring spoon, oral syringe, or medicine cup. · Missed dose: Take a dose as soon as you remember. If it is almost time for your next dose, wait until then and take a regular dose. Do not take extra medicine to make up for a missed dose. · Store the medicine in a closed container at room temperature, away from heat, moisture, and direct light. Drugs and Foods to Avoid:   Ask your doctor or pharmacist before using any other medicine, including over-the-counter medicines, vitamins, and herbal products. · Do not use this medicine together with aliskiren if you have diabetes. · Some foods and medicines may affect how lisinopril works. Tell your doctor if you are using any of the following:   ¨ Aliskiren, everolimus, lithium, sirolimus, temsirolimus  ¨ Another blood pressure medicine, including an angiotensin receptor blocker (ARB)  ¨ Diuretic (water pill, including amiloride, spironolactone, triamterene)  ¨ Insulin or diabetes medicine  ¨ NSAID pain or arthritis medicine (including aspirin, celecoxib, diclofenac, ibuprofen, naproxen)  · Ask your doctor before you use any medicine, supplement, or salt substitute that contains potassium.   Warnings While Using This Medicine:   · It is not safe to take this medicine during pregnancy. It could harm an unborn baby. Tell your doctor right away if you become pregnant. · Tell your doctor if you are breastfeeding, or if you have kidney disease, liver disease, diabetes, or heart or blood vessel disease. · This medicine may cause the following problems:  ¨ Angioedema (severe swelling)  ¨ Kidney problems  ¨ Serious liver problems  · This medicine could lower your blood pressure too much, especially when you first use it or if you are dehydrated. Stand or sit up slowly if you feel lightheaded or dizzy. · Do not stop using this medicine without asking your doctor, even if you feel well. This medicine will not cure your high blood pressure, but it will help keep it in a normal range. You may have to take blood pressure medicine for the rest of your life. · Tell any doctor or dentist who treats you that you are using this medicine. · Your doctor will do lab tests at regular visits to check on the effects of this medicine. Keep all appointments. · Keep all medicine out of the reach of children. Never share your medicine with anyone.   Possible Side Effects While Using This Medicine:   Call your doctor right away if you notice any of these side effects:  · Allergic reaction: Itching or hives, swelling in your face or hands, swelling or tingling in your mouth or throat, chest tightness, trouble breathing  · Blistering, peeling, or red skin rash  · Change in how much or how often you urinate  · Confusion, weakness, uneven heartbeat, trouble breathing, numbness or tingling in your hands, feet, or lips  · Dark urine or pale stools, nausea, vomiting, loss of appetite, stomach pain, yellow skin or eyes  · Fever, chills, sore throat, body aches  · Lightheadedness, dizziness, fainting  · Severe stomach pain (with or without nausea or vomiting)  If you notice these less serious side effects, talk with your doctor:   · Dry cough  If you notice other side effects that you think are caused by this medicine, tell your doctor. Call your doctor for medical advice about side effects. You may report side effects to FDA at 0-692-AIH-6156  © 2017 [address] [employer] [employer] Information is for End User's use only and may not be sold, redistributed or otherwise used for commercial purposes. The above information is an  only. It is not intended as medical advice for individual conditions or treatments. Talk to your doctor, nurse or pharmacist before following any medical regimen to see if it is safe and effective for you.

## 2019-04-04 ENCOUNTER — HOSPITAL ENCOUNTER (OUTPATIENT)
Dept: MRI IMAGING | Age: 79
Discharge: HOME | End: 2019-04-04
Attending: FAMILY MEDICINE
Payer: MEDICARE

## 2019-04-04 DIAGNOSIS — R51.9 UNILATERAL HEADACHE: ICD-10-CM

## 2019-04-04 PROCEDURE — 70551 MRI BRAIN STEM W/O DYE: CPT

## 2019-04-05 ENCOUNTER — TELEPHONE (OUTPATIENT)
Dept: FAMILY MEDICINE CLINIC | Age: 79
End: 2019-04-05

## 2019-04-05 NOTE — TELEPHONE ENCOUNTER
Call to patient. MRI without acute abnormality. BP at home has been 130s/80s. Still with intermittent headaches. Thinks it may be related to her sinuses. Is taking flonase . She will keep headache diary and see me as scheduled later this month. Call for sooner appt if symptoms worsen. She understands and was appreciative of the call.

## 2019-04-24 NOTE — PROGRESS NOTES
CC: hypertension, facial and back pain History of Present Illness: 
Lauren Mishra is a 66 y.o. female. Was last seen on 3/28/19 at which time lisinopril was increased from 5mg to 10mg daily. She has continued taking only the 5mg because she was scared her BP would go too low. Home BPs have been 120s-140s/60s-70s. Had been having some headaches with high blood pressure. Denies weakness, numbness/tingling, speech difficulty, balance problems, problems walking, vision changes. Denies pain with chewing. Denies eye pain. Denies hearing loss and ear pain. ESR ws normal. MRI brain without acute abnormality. Headaches have improved somewhat. Saw her eye doctor who believes it's the neuralgia from her fractured right orbit several years ago for which she also takes norco chronically. Requests a refill on Norco. Takes it for the infraorbital neuralgia and for chronic low back pain in the SI area that radiates to bilateral buttocks and posterior and anterior thighs. It is intermittent. Described as burning and stinging. Worsened by laying down. Denies numbness, weakness, urinary or fecal incontinence or retention. Has had laminectomy in the past. Has significant facial neuralgia pain that interferes with her daily life when she doesn't take the norco. Last dose of norco was last night. Takes 1.5 tabs daily of the 5-325 mg. Last prescription was 1/22/19 for 45 tabs with 2 refills. Last UDS was 2/2018 which showed hydrocodone. She has a pain management contract with Dr. Esther Jones. Allergies Allergen Reactions  Alendronate Unknown (comments)  Doxycycline Unknown (comments)  Erythromycin Unknown (comments)  Nsaids (Non-Steroidal Anti-Inflammatory Drug) Unknown (comments)  Pcn [Penicillins] Unknown (comments) Current Outpatient Medications Medication Sig Dispense Refill  [START ON 6/25/2019] HYDROcodone-acetaminophen (NORCO) 5-325 mg per tablet Take 1 Tab by mouth every twelve (12) hours as needed for Pain for up to 30 days. Max Daily Amount: 2 Tabs. 45 Tab 0  
 lisinopril (PRINIVIL, ZESTRIL) 5 mg tablet Take  by mouth daily.  benzocaine 20 % liqd Instill 4 to 5 drops of otic solution into external ear canal of affected ear(s), repeat every 1 to 2 hours if necessary 9 mL 0  
 gabapentin (NEURONTIN) 100 mg capsule TAKE 2 CAPSULES EVERY DAY  IN  MID  AFTERNOON  FOR  RESTLESS LEGS AT NIGHT 180 Cap 1  
 fluticasone (FLONASE) 50 mcg/actuation nasal spray 1 [place] by Both Nostrils route daily.  meloxicam (MOBIC) 15 mg tablet Take 15 mg by mouth daily.  traZODone (DESYREL) 50 mg tablet Take  by mouth nightly.  alendronate (FOSAMAX) 70 mg tablet Take  by mouth every seven (7) days. Patient Active Problem List  
Diagnosis Code  Body mass index (BMI) 20.0-20.9, adult Z68.20  
 HTN (hypertension) I10  
 Facial pain R51  Generalized osteoarthritis M15.9  Hyperlipidemia E78.5  Lower back pain M54.5  Osteoporosis M81.0  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 Past Medical History:  
Diagnosis Date  Allergic rhinitis  Carpal tunnel syndrome  Facial pain 7/18/2018  Fibromyalgia  Generalized osteoarthritis 7/18/2018  GERD (gastroesophageal reflux disease)  Hyperlipemia  Hyperlipidemia 7/18/2018  Hypertension  IBS (irritable bowel syndrome)  Lower back pain 7/18/2018  Osteoarthritis  Osteoporosis 7/18/2018 Past Surgical History:  
Procedure Laterality Date  HX COLONOSCOPY  01/2010  HX HYSTERECTOMY  HX OTHER SURGICAL    
 lumbar disc surgery  HX SALPINGO-OOPHORECTOMY Social History Socioeconomic History  Marital status:  Spouse name: Not on file  Number of children: Not on file  Years of education: Not on file  Highest education level: Not on file Tobacco Use  Smoking status: Never Smoker  Smokeless tobacco: Never Used Substance and Sexual Activity  Alcohol use: No  
  Frequency: Never  Drug use: No  
 Sexual activity: Yes Family History Problem Relation Age of Onset  Stroke Mother  Heart Attack Father  Hypertension Brother  Inflammatory Bowel Dz Brother Review of Systems Constitutional: Negative for chills and fever. HENT: Negative for hearing loss and tinnitus. Facial pain Eyes: Negative for blurred vision and double vision. Respiratory: Negative for shortness of breath. Cardiovascular: Negative for chest pain, palpitations and leg swelling. Musculoskeletal: Positive for back pain. Neurological: Positive for headaches. Negative for dizziness, tingling, tremors, sensory change, speech change, focal weakness and weakness. Physical Exam: 
Visit Vitals /71 Pulse 73 Temp 98 °F (36.7 °C) (Oral) Resp 16 Ht 5' 3\" (1.6 m) Wt 111 lb (50.3 kg) SpO2 95% BMI 19.66 kg/m² Physical Exam  
Constitutional: She is oriented to person, place, and time. She appears well-developed and well-nourished. No distress. Cardiovascular: Normal rate, regular rhythm and normal heart sounds. Pulmonary/Chest: Effort normal and breath sounds normal.  
Musculoskeletal:  
     Lumbar back: She exhibits decreased range of motion (extension) and pain. She exhibits no bony tenderness, no swelling, no edema and no deformity. Midline laminectomy scar Neurological: She is alert and oriented to person, place, and time. Assessment/Plan: 1. Essential hypertension Slightly elevated today but controlled at home on lisinopril 5mg. Continue current treatment. 2. 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 Pain contract with Dr. Jose Manuel Jordan.  reviewed and shows appropriate use. UDS ordered today. Discussed risks and benefits of long-term opiate therapy with patient.  Benefits outweigh the risks in this case which she agrees with. Dr. Yodit August prescribed 3 separate prescriptions for Norco 5-235 mg per tablet; Take 1 Tab by mouth every 12 hours prn pain up to 30 days. Max daily amount: 2 tabs. Dispense 45 Tab; Refill 0. One to be filled 4/25/19. One to be filled 5/25/19. One to be filled 6/25/19.  
- HYDROcodone-acetaminophen (NORCO) 5-325 mg per tablet; Take 1 Tab by mouth every twelve (12) hours as needed for Pain for up to 30 days. Max Daily Amount: 2 Tabs. Dispense: 45 Tab; Refill: 0 
- HYDROcodone-acetaminophen (NORCO) 5-325 mg per tablet; Take 1 Tab by mouth every twelve (12) hours as needed for Pain for up to 30 days. Max Daily Amount: 2 Tabs. Dispense: 45 Tab; Refill: 0 
HYDROcodone-acetaminophen (NORCO) 5-325 mg per tablet; Take 1 Tab by mouth every twelve (12) hours as needed for Pain for up to 30 days. Max Daily Amount: 2 Tabs. Dispense: 45 Tab; Refill: 0 
 
 
3. Encounter for long-term opiate analgesic use 
- 10-PANEL URINE DRUG SCREEN Follow-up and Dispositions · Return in about 3 months (around 7/25/2019). Diagnoses, tests, plan, and follow up discussed with patient. I have given to and reviewed with the patient the after visit summary. I have reviewed medication side effects and precautions. Patient expressed agreement and understanding. All questions were answered. Discussed with Dr. Yodit August.

## 2019-04-25 ENCOUNTER — OFFICE VISIT (OUTPATIENT)
Dept: FAMILY MEDICINE CLINIC | Age: 79
End: 2019-04-25

## 2019-04-25 VITALS
SYSTOLIC BLOOD PRESSURE: 158 MMHG | HEIGHT: 63 IN | TEMPERATURE: 98 F | RESPIRATION RATE: 16 BRPM | BODY MASS INDEX: 19.67 KG/M2 | OXYGEN SATURATION: 95 % | WEIGHT: 111 LBS | HEART RATE: 73 BPM | DIASTOLIC BLOOD PRESSURE: 71 MMHG

## 2019-04-25 DIAGNOSIS — I10 ESSENTIAL HYPERTENSION: Primary | ICD-10-CM

## 2019-04-25 DIAGNOSIS — G50.0 INFRAORBITAL NEURALGIA: ICD-10-CM

## 2019-04-25 DIAGNOSIS — Z79.891 ENCOUNTER FOR LONG-TERM OPIATE ANALGESIC USE: ICD-10-CM

## 2019-04-25 RX ORDER — HYDROCODONE BITARTRATE AND ACETAMINOPHEN 5; 325 MG/1; MG/1
1 TABLET ORAL
Qty: 45 TAB | Refills: 0 | Status: SHIPPED | OUTPATIENT
Start: 2019-05-25 | End: 2019-04-25 | Stop reason: SDUPTHER

## 2019-04-25 RX ORDER — HYDROCODONE BITARTRATE AND ACETAMINOPHEN 5; 325 MG/1; MG/1
1 TABLET ORAL
Qty: 45 TAB | Refills: 0 | Status: SHIPPED | OUTPATIENT
Start: 2019-04-25 | End: 2019-04-25 | Stop reason: SDUPTHER

## 2019-04-25 RX ORDER — HYDROCODONE BITARTRATE AND ACETAMINOPHEN 5; 325 MG/1; MG/1
1 TABLET ORAL
Qty: 45 TAB | Refills: 0 | Status: SHIPPED | OUTPATIENT
Start: 2019-06-25 | End: 2019-07-18 | Stop reason: SDUPTHER

## 2019-04-25 RX ORDER — LISINOPRIL 5 MG/1
10 TABLET ORAL DAILY
COMMUNITY
End: 2019-08-01 | Stop reason: SDUPTHER

## 2019-04-25 NOTE — PATIENT INSTRUCTIONS
Hydrocodone/Acetaminophen (By mouth) Acetaminophen (y-qoei-p-MIN-oh-fen), Hydrocodone Bitartrate (msv-qlnk-GRQ-done bye-TAR-trate) Treats pain. This medicine contains a narcotic pain reliever. Brand Name(s): Hycet, Lorcet, Lorcet HD, Lorcet Plus, Lortab 10/325, Lortab 5/325, Lortab 7.5/325, Lortab Elixir, Norco, Verdrocet, Vicodin, Vicodin ES, Vicodin HP, Xodol, Xodol 5/300 There may be other brand names for this medicine. When This Medicine Should Not Be Used: This medicine is not right for everyone. Do not use it if you had an allergic reaction to acetaminophen, hydrocodone, or other narcotic medicines, or stomach or bowel blockage (including paralytic ileus). How to Use This Medicine:  
Capsule, Liquid, Tablet · Your doctor will tell you how much medicine to use. Do not use more than directed. · An overdose can be dangerous. Follow directions carefully so you do not get too much medicine at one time. · Oral liquid: Measure the oral liquid medicine with a marked measuring spoon, oral syringe, or medicine cup. · Drink plenty of liquids to help avoid constipation. · This medicine should come with a Medication Guide. Ask your pharmacist for a copy if you do not have one. · Missed dose: Take a dose as soon as you remember. If it is almost time for your next dose, wait until then and take a regular dose. Do not take extra medicine to make up for a missed dose. · Store the medicine in a closed container at room temperature, away from heat, moisture, and direct light. Flush any unused Norco® tablets down the toilet. Drugs and Foods to Avoid: Ask your doctor or pharmacist before using any other medicine, including over-the-counter medicines, vitamins, and herbal products. · Do not use this medicine if you are using or have used an MAO inhibitor within the past 14 days. · Some medicines can affect how hydrocodone/acetaminophen works. Tell your doctor if you are using any of the following: ¨ Carbamazepine, erythromycin, ketoconazole, mirtazapine, phenytoin, rifampin, ritonavir, tramadol, trazodone ¨ Diuretic (water pill) ¨ Medicine to treat depression or mental health problems ¨ Medicine to treat migraine headaches ¨ Phenothiazine medicine · Tell your doctor if you use anything else that makes you sleepy. Some examples are allergy medicine, narcotic pain medicine, and alcohol. Tell your doctor if you are using buprenorphine, butorphanol, nalbuphine, pentazocine, or a muscle relaxer. · Do not drink alcohol while you are using this medicine. Acetaminophen can damage your liver, and your risk is higher if you also drink alcohol. Warnings While Using This Medicine: · Tell your doctor if you are pregnant or breastfeeding, or if you have kidney disease, liver disease, lung or breathing problems, gallbladder or pancreas problems, an underactive thyroid, Abdulaziz disease, prostate problems, trouble urinating, stomach problems, or a history of head injury or brain tumor, seizures, alcohol or drug addiction. · This medicine may cause the following problems:  
¨ High risk of overdose, which can lead to death ¨ Respiratory depression (serious breathing problem that can be life-threatening) ¨ Liver problems ¨ Serious skin reactions ¨ Serotonin syndrome (when used with certain medicines) · This medicine can be habit-forming. Do not use more than your prescribed dose. Call your doctor if you think your medicine is not working. · This medicine may make you dizzy or drowsy. Do not drive or doing anything else that could be dangerous until you know how this medicine affects you. · This medicine contains acetaminophen. Read the labels of all other medicines you are using to see if they also contain acetaminophen, or ask your doctor or pharmacist. Anahy Caldwell not use more than 4 grams (4,000 milligrams) total of acetaminophen in one day.  
· Tell any doctor or dentist who treats you that you are using this medicine. This medicine may affect certain medical test results. · This medicine may cause constipation, especially with long-term use. Ask your doctor if you should use a laxative to prevent and treat constipation. · This medicine could cause infertility. Talk with your doctor before using this medicine if you plan to have children. · Keep all medicine out of the reach of children. Never share your medicine with anyone. Possible Side Effects While Using This Medicine:  
Call your doctor right away if you notice any of these side effects: · Allergic reaction: Itching or hives, swelling in your face or hands, swelling or tingling in your mouth or throat, chest tightness, trouble breathing · Anxiety, restlessness, fast heartbeat, fever, sweating, muscle spasms, twitching, diarrhea, seeing or hearing things that are not there · Blistering, peeling, red skin rash · Blue lips, fingernails, or skin · Dark urine or pale stools, loss of appetite, nausea or vomiting, stomach pain, yellow skin or eyes · Extreme weakness, shallow breathing, slow heartbeat, sweating, seizures, cold or clammy skin · Lightheadedness, dizziness, fainting If you notice these less serious side effects, talk with your doctor: · Constipation, nausea, vomiting · Tiredness or sleepiness If you notice other side effects that you think are caused by this medicine, tell your doctor. Call your doctor for medical advice about side effects. You may report side effects to FDA at 7-939-FDA-1145 © 2017 [facility] Information is for End User's use only and may not be sold, redistributed or otherwise used for commercial purposes. The above information is an  only. It is not intended as medical advice for individual conditions or treatments. Talk to your doctor, nurse or pharmacist before following any medical regimen to see if it is safe and effective for you.

## 2019-04-25 NOTE — PROGRESS NOTES
Chief Complaint Patient presents with  Hypertension HYPERTENSION FOLLOW UP     TODAY'S BP  151/64 Health Maintenance Due Topic  DTaP/Tdap/Td series (1 - Tdap)  Shingrix Vaccine Age 50> (1 of 2) Wt Readings from Last 3 Encounters:  
04/25/19 111 lb (50.3 kg) 03/28/19 113 lb (51.3 kg) 03/18/19 111 lb (50.3 kg) Temp Readings from Last 3 Encounters:  
04/25/19 98 °F (36.7 °C) (Oral) 03/28/19 98.3 °F (36.8 °C)  
03/14/19 98.1 °F (36.7 °C) (Oral) BP Readings from Last 3 Encounters:  
04/25/19 151/64  
03/28/19 168/69  
03/18/19 151/78 Pulse Readings from Last 3 Encounters:  
04/25/19 73  
03/28/19 73  
03/18/19 92 Learning Assessment: 
:  
 
Learning Assessment 4/25/2019 PRIMARY LEARNER Patient BARRIERS PRIMARY LEARNER NONE  
CO-LEARNER CAREGIVER No  
PRIMARY LANGUAGE ENGLISH  
LEARNER PREFERENCE PRIMARY DEMONSTRATION  
  READING  
ANSWERED BY SELF  
RELATIONSHIP SELF Depression Screening: 
:  
 
3 most recent PHQ Screens 4/25/2019 Little interest or pleasure in doing things Not at all Feeling down, depressed, irritable, or hopeless Not at all Total Score PHQ 2 0 Fall Risk Assessment: 
:  
 
Fall Risk Assessment, last 12 mths 4/25/2019 Able to walk? Yes Fall in past 12 months? No  
 
 
Abuse Screening: 
:  
 
No flowsheet data found. Coordination of Care Questionnaire: 
:  
 
1) Have you been to an emergency room, urgent care clinic since your last visit? NO Hospitalized since your last visit? NO  
 
2) Have you seen or consulted any other health care providers outside of [address] [place] [address] since your last visit? NO 
  (Include any pap smears or colon screenings in this section.) 3) Do you have an Advance Directive on file? NO Patient is accompanied by self I have received verbal consent from Elgin Schmidt to discuss any/all medical information while they are present in the room.

## 2019-05-02 LAB
AMPHETAMINES UR QL SCN: NEGATIVE NG/ML
BARBITURATES UR QL SCN: NEGATIVE NG/ML
BENZODIAZ UR QL: NEGATIVE NG/ML
BZE UR QL: NEGATIVE NG/ML
CANNABINOIDS UR QL SCN: NEGATIVE NG/ML
MDMA UR QL SCN: NEGATIVE NG/ML
METHADONE UR QL SCN: NEGATIVE NG/ML
METHAQUALONE UR QL: NEGATIVE NG/ML
OPIATES UR QL: NEGATIVE
PCP UR QL: NEGATIVE NG/ML
PROPOXYPH UR QL SCN: NEGATIVE NG/ML

## 2019-05-03 ENCOUNTER — TELEPHONE (OUTPATIENT)
Dept: FAMILY MEDICINE CLINIC | Age: 79
End: 2019-05-03

## 2019-05-03 NOTE — TELEPHONE ENCOUNTER
Pharmacy sent a note to provider on  [address] 5-325MG TAB  Qty: 45    Note: Consider tapering opiod use with adding nerve pain medication as primary pain medication

## 2019-05-23 ENCOUNTER — OFFICE VISIT (OUTPATIENT)
Dept: FAMILY MEDICINE CLINIC | Age: 79
End: 2019-05-23

## 2019-05-23 ENCOUNTER — TELEPHONE (OUTPATIENT)
Dept: FAMILY MEDICINE CLINIC | Age: 79
End: 2019-05-23

## 2019-05-23 VITALS
HEIGHT: 63 IN | BODY MASS INDEX: 19.84 KG/M2 | OXYGEN SATURATION: 93 % | TEMPERATURE: 99.1 F | WEIGHT: 112 LBS | HEART RATE: 85 BPM | DIASTOLIC BLOOD PRESSURE: 79 MMHG | SYSTOLIC BLOOD PRESSURE: 174 MMHG | RESPIRATION RATE: 16 BRPM

## 2019-05-23 DIAGNOSIS — M79.604 PAIN OF RIGHT LOWER EXTREMITY: Primary | ICD-10-CM

## 2019-05-23 NOTE — TELEPHONE ENCOUNTER
Patient called the office stating that she has an appt at [facility] in the morning at 9:30am regarding a possible blood clot in her leg per Dr. Fany Lynn. She is stating we can just cancel this appt as she doesn't need it. Unsure if there needs to be more follow up or why she is stating she doesn't need this.

## 2019-05-23 NOTE — PROGRESS NOTES
1. Have you been to the ER, urgent care clinic, or been hospitalized since your last visit? No     2. Have you seen or consulted any other health care providers outside of the [address] [place] [address] since your last visit?   No     Reviewed record in preparation for visit and have necessary documentation  opportunity was given for questions  Goals that were addressed and/or need to be completed during or after this appointment include   Health Maintenance Due   Topic Date Due    DTaP/Tdap/Td series (1 - Tdap) 10/01/1961    Shingrix Vaccine Age 50> (1 of 2) 10/01/1990

## 2019-05-23 NOTE — TELEPHONE ENCOUNTER
Attempted to call. No answer. Message left. Attempted to reach patient regarding need to cancel the testing ordered tomorrow.

## 2019-05-23 NOTE — PROGRESS NOTES
Zuleika Merino  66 y.o. female  1940  [address] [place]  [id number]     1940 [address] MEDICINE: Progress Note       Encounter Date: 5/23/2019    Chief Complaint   Patient presents with    Leg Pain     starting hurting last week, painful walking and sitting, throbbing (has tried heat and ice)       History provided by patient  History of Present Illness   Zuleika Merino is a 66 y.o. female who presents to clinic today for:     right leg pain  Patient present with cc of right leg pain for 1-2 weeks. Pain is constant but worse with walking and long periods of sitting. Pain described as throbbing in calf, thigh and right knee She has applied heat, ice and 2 tabs of norco without any improvement. Health Maintenance  VIIS queried and reviewed; updated in chart as appropriate. Health Maintenance Due   Topic Date Due    DTaP/Tdap/Td series (1 - Tdap) 10/01/1961    Shingrix Vaccine Age 50> (1 of 2) 10/01/1990     Review of Systems   Review of Systems   Cardiovascular: Positive for claudication. Negative for leg swelling. Skin: Negative for itching and rash. Neurological: Negative for tingling. Vitals/Objective:     Vitals:    05/23/19 1512 05/23/19 1518   BP: 166/71 174/79   Pulse: 85    Resp: 16    Temp: 99.1 °F (37.3 °C)    TempSrc: Oral    SpO2: 93%    Weight: 112 lb (50.8 kg)    Height: 5' 3\" (1.6 m)      Body mass index is 19.84 kg/m². Wt Readings from Last 3 Encounters:   05/23/19 112 lb (50.8 kg)   04/25/19 111 lb (50.3 kg)   03/28/19 113 lb (51.3 kg)       Physical Exam   Constitutional: She is oriented to person, place, and time. She appears well-developed and well-nourished. HENT:   Head: Normocephalic and atraumatic. Right Ear: External ear normal.   Left Ear: External ear normal.   Cardiovascular: Normal rate. Musculoskeletal: She exhibits edema (trace edema B/l r>l) and tenderness.    Neurological: She is alert and oriented to person, place, and time.   Skin: Skin is warm. No rash noted. No erythema. No results found for this or any previous visit (from the past 24 hour(s)). Assessment and Plan:     Encounter Diagnoses     ICD-10-CM ICD-9-CM   1. Pain of right lower extremity M79.604 729.5       1. Pain of right lower extremity  Concern for DVT. Possibly due to claudication but need to rule out DVT first. Appt for duplex set up for tomorrow morning.   - DUPLEX LOWER EXT VENOUS RIGHT; Future      I have discussed the diagnosis with the patient and the intended plan as seen in the above orders. she has expressed understanding. The patient has received an after-visit summary and questions were answered concerning future plans. I have discussed medication side effects and warnings with the patient as well. Electronically Signed: Lacy Jackson MD     History/Allergies   Patients past medical, surgical and family histories were reviewed and updated.     Past Medical History:   Diagnosis Date    Allergic rhinitis     Carpal tunnel syndrome     Facial pain 7/18/2018    Fibromyalgia     Generalized osteoarthritis 7/18/2018    GERD (gastroesophageal reflux disease)     Hyperlipemia     Hyperlipidemia 7/18/2018    Hypertension     IBS (irritable bowel syndrome)     Lower back pain 7/18/2018    Osteoarthritis     Osteoporosis 7/18/2018      Past Surgical History:   Procedure Laterality Date    HX COLONOSCOPY  01/2010    HX HYSTERECTOMY      HX OTHER SURGICAL      lumbar disc surgery    HX SALPINGO-OOPHORECTOMY       Family History   Problem Relation Age of Onset    Stroke Mother     Heart Attack Father     Hypertension Brother     Inflammatory Bowel Dz Brother      Social History     Tobacco Use    Smoking status: Never Smoker    Smokeless tobacco: Never Used   Substance Use Topics    Alcohol use: No     Frequency: Never    Drug use: No          Allergies   Allergen Reactions    Alendronate Unknown (comments)    Doxycycline Unknown (comments)    Erythromycin Unknown (comments)    Nsaids (Non-Steroidal Anti-Inflammatory Drug) Unknown (comments)    Pcn [Penicillins] Unknown (comments)       Disposition         No future appointments. Current Medications after this visit     Current Outpatient Medications   Medication Sig    [START ON 6/25/2019] HYDROcodone-acetaminophen (NORCO) 5-325 mg per tablet Take 1 Tab by mouth every twelve (12) hours as needed for Pain for up to 30 days. Max Daily Amount: 2 Tabs.  lisinopril (PRINIVIL, ZESTRIL) 5 mg tablet Take  by mouth daily.  benzocaine 20 % liqd Instill 4 to 5 drops of otic solution into external ear canal of affected ear(s), repeat every 1 to 2 hours if necessary    gabapentin (NEURONTIN) 100 mg capsule TAKE 2 CAPSULES EVERY DAY  IN  MID  AFTERNOON  FOR  RESTLESS LEGS AT NIGHT    fluticasone (FLONASE) 50 mcg/actuation nasal spray 1 [place] by Both Nostrils route daily.  meloxicam (MOBIC) 15 mg tablet Take 15 mg by mouth daily.  traZODone (DESYREL) 50 mg tablet Take  by mouth nightly.  alendronate (FOSAMAX) 70 mg tablet Take  by mouth every seven (7) days. No current facility-administered medications for this visit. There are no discontinued medications.

## 2019-07-17 DIAGNOSIS — G50.0 INFRAORBITAL NEURALGIA: Primary | ICD-10-CM

## 2019-07-17 RX ORDER — OXYCODONE AND ACETAMINOPHEN 5; 325 MG/1; MG/1
1.5 TABLET ORAL
COMMUNITY
End: 2019-07-18 | Stop reason: ALTCHOICE

## 2019-07-18 ENCOUNTER — TELEPHONE (OUTPATIENT)
Dept: FAMILY MEDICINE CLINIC | Age: 79
End: 2019-07-18

## 2019-07-18 DIAGNOSIS — G50.0 INFRAORBITAL NEURALGIA: ICD-10-CM

## 2019-07-18 RX ORDER — HYDROCODONE BITARTRATE AND ACETAMINOPHEN 5; 325 MG/1; MG/1
1 TABLET ORAL
Qty: 45 TAB | Refills: 0 | Status: SHIPPED | OUTPATIENT
Start: 2019-07-18 | End: 2019-08-01 | Stop reason: SDUPTHER

## 2019-07-18 RX ORDER — OXYCODONE AND ACETAMINOPHEN 5; 325 MG/1; MG/1
1.5 TABLET ORAL
Qty: 45 TAB | Refills: 0 | OUTPATIENT
Start: 2019-07-18 | End: 2019-08-17

## 2019-07-18 NOTE — TELEPHONE ENCOUNTER
Dr. Dank Rebolledo,  I called the pharmacy and patient is not taking the requested medication, looks like the wrong drug was typed in.  Patient needs a refill on her  Hydrocodone       - Mj

## 2019-08-01 ENCOUNTER — OFFICE VISIT (OUTPATIENT)
Dept: FAMILY MEDICINE CLINIC | Age: 79
End: 2019-08-01

## 2019-08-01 VITALS
BODY MASS INDEX: 19.14 KG/M2 | OXYGEN SATURATION: 97 % | SYSTOLIC BLOOD PRESSURE: 185 MMHG | DIASTOLIC BLOOD PRESSURE: 75 MMHG | HEART RATE: 73 BPM | TEMPERATURE: 98.4 F | RESPIRATION RATE: 16 BRPM | WEIGHT: 108 LBS | HEIGHT: 63 IN

## 2019-08-01 DIAGNOSIS — G50.0 INFRAORBITAL NEURALGIA: Primary | ICD-10-CM

## 2019-08-01 DIAGNOSIS — G25.81 RESTLESS LEGS SYNDROME: ICD-10-CM

## 2019-08-01 DIAGNOSIS — I10 ESSENTIAL HYPERTENSION: ICD-10-CM

## 2019-08-01 DIAGNOSIS — G47.01 INSOMNIA DUE TO MEDICAL CONDITION: ICD-10-CM

## 2019-08-01 RX ORDER — LISINOPRIL 10 MG/1
10 TABLET ORAL DAILY
Qty: 30 TAB | Refills: 2 | Status: SHIPPED | OUTPATIENT
Start: 2019-08-01 | End: 2019-10-23 | Stop reason: SDUPTHER

## 2019-08-01 RX ORDER — HYDROCODONE BITARTRATE AND ACETAMINOPHEN 5; 325 MG/1; MG/1
1 TABLET ORAL
Qty: 45 TAB | Refills: 0 | Status: SHIPPED | OUTPATIENT
Start: 2019-08-17 | End: 2019-08-01 | Stop reason: SDUPTHER

## 2019-08-01 RX ORDER — PRAMIPEXOLE DIHYDROCHLORIDE 0.12 MG/1
0.12 TABLET ORAL 3 TIMES DAILY
Qty: 30 TAB | Refills: 0 | Status: SHIPPED | OUTPATIENT
Start: 2019-08-01 | End: 2019-10-23

## 2019-08-01 RX ORDER — HYDROCODONE BITARTRATE AND ACETAMINOPHEN 5; 325 MG/1; MG/1
1 TABLET ORAL
Qty: 45 TAB | Refills: 0 | Status: SHIPPED | OUTPATIENT
Start: 2019-09-17 | End: 2019-10-17

## 2019-08-01 NOTE — PROGRESS NOTES
Georgia Wood is a 66 y.o. female      Chief Complaint   Patient presents with    Medication Refill         1. Have you been to the ER, urgent care clinic since your last visit? Hospitalized since your last visit? Yes 5/23/19 leg pain      2. Have you seen or consulted any other health care providers outside of the [address] [place] [address] since your last visit? Include any pap smears or colon screening.  no

## 2019-08-01 NOTE — PROGRESS NOTES
Assessment and Plan    1. Infraorbital neuralgia  Continue hydrocodone for now with current personal loss. It is evident, however, that patient is no longer using narcotic for face but rather for sleep and we will need to taper off narcotics and find alternative therapy  - HYDROcodone-acetaminophen (NORCO) 5-325 mg per tablet; Take 1 Tab by mouth every twelve (12) hours as needed for Pain for up to 30 days. Max Daily Amount: 2 Tabs. Dispense: 45 Tab; Refill: 0    2. Essential hypertension  Not at goal, reeval at next appt  BP checks by nurses  - lisinopril (PRINIVIL, ZESTRIL) 10 mg tablet; Take 1 Tab by mouth daily. Dispense: 30 Tab; Refill: 2    3. Insomnia due to medical condition  Restless legs    4. Restless legs syndrome  DC gabapentin and take trazodone and mirapex. Neuro eval if we do not get adequate response.  - pramipexole (MIRAPEX) 0.125 mg tablet; Take 1 Tab by mouth three (3) times daily. Dispense: 30 Tab; Refill: 0      Follow-up and Dispositions    · Return in about 3 weeks (around 8/22/2019) for Blood pressure follow up, Medication follow up. Diagnosis and plan discussed with patient who verbillized understanding. History of present illness:Ying Laughlin is a 66 y.o. female presenting for Medication Refill    Recent death of brother, Julio César Young, who was also a patient here. Very stressed    Recently seen in [address] [place] [address] ER for leg pain. Still hurt her at night. Jerk and jump at night  Currently taking gabapentin 100 mg X 2 at bedtime for restless legs. Also on Trazadone at bedtime. Not clear that those meds really help    Chronic pain   Hydrocodone 5/325  1/2 during the day and 1 at bedtime  Facial pain was source of original narcotic rx. Now taking meds more at night to help sleep. Review of Systems   Constitutional: Positive for malaise/fatigue. Negative for chills, fever and weight loss. Respiratory: Negative. Cardiovascular: Negative for chest pain and leg swelling. Gastrointestinal: Negative. Genitourinary: Negative. Neurological: Negative for focal weakness, seizures, loss of consciousness and weakness. Psychiatric/Behavioral: Positive for depression. Negative for suicidal ideas. The patient is nervous/anxious and has insomnia.           Past Medical History:   Diagnosis Date    Allergic rhinitis     Carpal tunnel syndrome     Facial pain 7/18/2018    Fibromyalgia     Generalized osteoarthritis 7/18/2018    GERD (gastroesophageal reflux disease)     Hyperlipidemia 7/18/2018    Hypertension     IBS (irritable bowel syndrome)     Lower back pain 7/18/2018    Osteoporosis 7/18/2018     Past Surgical History:   Procedure Laterality Date    HX COLONOSCOPY  01/2010    HX HYSTERECTOMY      HX OTHER SURGICAL      lumbar disc surgery    HX SALPINGO-OOPHORECTOMY       Family History   Problem Relation Age of Onset    Stroke Mother     Heart Attack Father     Hypertension Brother     Inflammatory Bowel Dz Brother      Social History     Socioeconomic History    Marital status:      Spouse name: Not on file    Number of children: Not on file    Years of education: Not on file    Highest education level: Not on file   Occupational History    Not on file   Social Needs    Financial resource strain: Not on file    Food insecurity:     Worry: Not on file     Inability: Not on file    Transportation needs:     Medical: Not on file     Non-medical: Not on file   Tobacco Use    Smoking status: Never Smoker    Smokeless tobacco: Never Used   Substance and Sexual Activity    Alcohol use: No     Frequency: Never    Drug use: No    Sexual activity: Yes   Lifestyle    Physical activity:     Days per week: Not on file     Minutes per session: Not on file    Stress: Not on file   Relationships    Social connections:     Talks on phone: Not on file     Gets together: Not on file     Attends Scientologist service: Not on file     Active member of club or organization: Not on file     Attends meetings of clubs or organizations: Not on file     Relationship status: Not on file    Intimate partner violence:     Fear of current or ex partner: Not on file     Emotionally abused: Not on file     Physically abused: Not on file     Forced sexual activity: Not on file   Other Topics Concern    Not on file   Social History Narrative    Not on file         Current Outpatient Medications   Medication Sig Dispense Refill    [START ON 9/17/2019] HYDROcodone-acetaminophen (NORCO) 5-325 mg per tablet Take 1 Tab by mouth every twelve (12) hours as needed for Pain for up to 30 days. Max Daily Amount: 2 Tabs. 45 Tab 0    lisinopril (PRINIVIL, ZESTRIL) 10 mg tablet Take 1 Tab by mouth daily. 30 Tab 2    pramipexole (MIRAPEX) 0.125 mg tablet Take 1 Tab by mouth three (3) times daily. 30 Tab 0    benzocaine 20 % liqd Instill 4 to 5 drops of otic solution into external ear canal of affected ear(s), repeat every 1 to 2 hours if necessary 9 mL 0    fluticasone (FLONASE) 50 mcg/actuation nasal spray 1 [place] by Both Nostrils route daily.  meloxicam (MOBIC) 15 mg tablet Take 15 mg by mouth daily.  traZODone (DESYREL) 50 mg tablet Take  by mouth nightly.  alendronate (FOSAMAX) 70 mg tablet Take  by mouth every seven (7) days. Indications: Not taking           Allergies   Allergen Reactions    Alendronate Unknown (comments)    Doxycycline Unknown (comments)    Erythromycin Unknown (comments)    Nsaids (Non-Steroidal Anti-Inflammatory Drug) Unknown (comments)    Pcn [Penicillins] Unknown (comments)       Vitals:    08/01/19 1601   BP: 185/75   Pulse: 73   Resp: 16   Temp: 98.4 °F (36.9 °C)   TempSrc: Oral   SpO2: 97%   Weight: 108 lb (49 kg)   Height: 5' 3\" (1.6 m)     Body mass index is 19.13 kg/m².     Objective  General: Patient alert and oriented and in NAD  Neck: No thyromegaly or cervical lymphadenopathy  Cardiovascular: Heart has regular rate and rhythm, No murmurs, rubs or gallops. No edema  Respiratory: Lungs are clear to auscultation bilaterally, no wheezing, rales or rhonchi, normal chest excursion and no increased work of breathing. Musculoskeletal: All four extremities present and functional. Extremities, no motor deficits, good pulses and skin color, FROM joints of lower ext. Skin: No rashes or lesions noted on exposed skin  Neuro: AAOx3, normal gait and speech. No gross neurologic deficits. Psych: Appropriate mood and affect, no homicidal or suicidal ideation, no obsessions, delusions or hallucinations, normal psychomotor status.

## 2019-10-23 ENCOUNTER — OFFICE VISIT (OUTPATIENT)
Dept: FAMILY MEDICINE CLINIC | Age: 79
End: 2019-10-23

## 2019-10-23 VITALS
HEIGHT: 63 IN | RESPIRATION RATE: 16 BRPM | TEMPERATURE: 98.6 F | OXYGEN SATURATION: 98 % | SYSTOLIC BLOOD PRESSURE: 148 MMHG | DIASTOLIC BLOOD PRESSURE: 70 MMHG | BODY MASS INDEX: 18.87 KG/M2 | WEIGHT: 106.5 LBS | HEART RATE: 63 BPM

## 2019-10-23 DIAGNOSIS — G25.81 RESTLESS LEGS SYNDROME: ICD-10-CM

## 2019-10-23 DIAGNOSIS — G47.01 INSOMNIA DUE TO MEDICAL CONDITION: Primary | ICD-10-CM

## 2019-10-23 DIAGNOSIS — I10 ESSENTIAL HYPERTENSION: ICD-10-CM

## 2019-10-23 DIAGNOSIS — G51.8 FACIAL NEURALGIA: ICD-10-CM

## 2019-10-23 RX ORDER — HYDROCODONE BITARTRATE AND ACETAMINOPHEN 5; 325 MG/1; MG/1
1 TABLET ORAL
Qty: 45 TAB | Refills: 0 | Status: SHIPPED | OUTPATIENT
Start: 2019-12-26 | End: 2019-12-29

## 2019-10-23 RX ORDER — GABAPENTIN 100 MG/1
200 CAPSULE ORAL ONCE
Qty: 180 CAP | Refills: 1 | Status: SHIPPED | OUTPATIENT
Start: 2019-10-23 | End: 2019-10-23

## 2019-10-23 RX ORDER — TRAZODONE HYDROCHLORIDE 50 MG/1
50 TABLET ORAL
Qty: 90 TAB | Refills: 1 | Status: SHIPPED | OUTPATIENT
Start: 2019-10-23 | End: 2020-07-27 | Stop reason: ALTCHOICE

## 2019-10-23 RX ORDER — HYDROCODONE BITARTRATE AND ACETAMINOPHEN 5; 325 MG/1; MG/1
1 TABLET ORAL
Qty: 45 TAB | Refills: 0 | Status: SHIPPED | OUTPATIENT
Start: 2019-11-26 | End: 2019-10-23 | Stop reason: SDUPTHER

## 2019-10-23 RX ORDER — HYDROCODONE BITARTRATE AND ACETAMINOPHEN 5; 325 MG/1; MG/1
1 TABLET ORAL
Qty: 45 TAB | Refills: 0 | Status: SHIPPED | OUTPATIENT
Start: 2019-10-26 | End: 2019-10-23 | Stop reason: SDUPTHER

## 2019-10-23 RX ORDER — GABAPENTIN 100 MG/1
200 CAPSULE ORAL ONCE
COMMUNITY
End: 2019-10-23 | Stop reason: SDUPTHER

## 2019-10-23 RX ORDER — PRAMIPEXOLE DIHYDROCHLORIDE 0.12 MG/1
0.12 TABLET ORAL
Qty: 90 TAB | Refills: 1 | Status: SHIPPED | OUTPATIENT
Start: 2019-10-23 | End: 2020-04-20 | Stop reason: SDUPTHER

## 2019-10-23 RX ORDER — LISINOPRIL 10 MG/1
10 TABLET ORAL DAILY
Qty: 90 TAB | Refills: 1 | Status: SHIPPED | OUTPATIENT
Start: 2019-10-23 | End: 2020-04-20 | Stop reason: SDUPTHER

## 2019-10-23 NOTE — PROGRESS NOTES
Florentin Medina is a 78 y.o. female      Chief Complaint   Patient presents with    Hypertension         1. Have you been to the ER, urgent care clinic since your last visit? Hospitalized since your last visit? no      2. Have you seen or consulted any other health care providers outside of the [address] [place] [address] since your last visit? Include any pap smears or colon screening.   no

## 2019-10-23 NOTE — PROGRESS NOTES
Assessment and Plan    1. Restless legs syndrome  Taking qhs only, refilled. Helps her sleep. - pramipexole (MIRAPEX) 0.125 mg tablet; Take 1 Tab by mouth nightly. Dispense: 90 Tab; Refill: 1    2. Essential hypertension  Nearer goal, will follow and increase dose further as necessary  - lisinopril (PRINIVIL, ZESTRIL) 10 mg tablet; Take 1 Tab by mouth daily. Dispense: 90 Tab; Refill: 1    3. Insomnia due to medical condition  Refilled. Seems to be helping  - traZODone (DESYREL) 50 mg tablet; Take 1 Tab by mouth nightly. Dispense: 90 Tab; Refill: 1    4. Facial neuralgia  Continued facial pain, averageing 1-1.5 hydrocodone at bedtime.  - gabapentin (NEURONTIN) 100 mg capsule; Take 2 Caps by mouth once for 1 dose. Max Daily Amount: 200 mg. Dispense: 180 Cap; Refill: 1  - HYDROcodone-acetaminophen (NORCO) 5-325 mg per tablet; Take 1 Tab by mouth every eight (8) hours as needed for Pain for up to 3 days. Max Daily Amount: 3 Tabs. Dispense: 45 Tab; Refill: 0      Follow-up and Dispositions    · Return in about 3 months (around 1/23/2020) for Medication follow up. Diagnosis and plan discussed with patient who verbillized understanding. History of present illness:Ying Laughlin is a 78 y.o. female presenting for Hypertension    Hypertension  Better on higher dose of lisinopril  Similar numbers at home  Systolic 817 here  Takes consistently    Chronic pain  Unchanged. Remains on hydrocodone and gabapentin  Needs refills   checked and looks OK, no sign of diversion    restliess legs. Only takes mirapex qhs    Review of Systems   Respiratory: Negative for shortness of breath. Cardiovascular: Negative for chest pain and palpitations. Gastrointestinal: Negative. Genitourinary: Negative. Musculoskeletal: Negative for falls. Neurological: Negative for loss of consciousness.          Past Medical History:   Diagnosis Date    Allergic rhinitis     Carpal tunnel syndrome     Facial pain 7/18/2018  Fibromyalgia     Generalized osteoarthritis 7/18/2018    GERD (gastroesophageal reflux disease)     Hyperlipidemia 7/18/2018    Hypertension     IBS (irritable bowel syndrome)     Lower back pain 7/18/2018    Osteoporosis 7/18/2018     Past Surgical History:   Procedure Laterality Date    HX COLONOSCOPY  01/2010    HX HYSTERECTOMY      HX OTHER SURGICAL      lumbar disc surgery    HX SALPINGO-OOPHORECTOMY       Family History   Problem Relation Age of Onset    Stroke Mother     Heart Attack Father     Hypertension Brother     Inflammatory Bowel Dz Brother      Social History     Socioeconomic History    Marital status:      Spouse name: Not on file    Number of children: Not on file    Years of education: Not on file    Highest education level: Not on file   Occupational History    Not on file   Social Needs    Financial resource strain: Not on file    Food insecurity:     Worry: Not on file     Inability: Not on file    Transportation needs:     Medical: Not on file     Non-medical: Not on file   Tobacco Use    Smoking status: Never Smoker    Smokeless tobacco: Never Used   Substance and Sexual Activity    Alcohol use: No     Frequency: Never    Drug use: No    Sexual activity: Yes   Lifestyle    Physical activity:     Days per week: Not on file     Minutes per session: Not on file    Stress: Not on file   Relationships    Social connections:     Talks on phone: Not on file     Gets together: Not on file     Attends Rastafarian service: Not on file     Active member of club or organization: Not on file     Attends meetings of clubs or organizations: Not on file     Relationship status: Not on file    Intimate partner violence:     Fear of current or ex partner: Not on file     Emotionally abused: Not on file     Physically abused: Not on file     Forced sexual activity: Not on file   Other Topics Concern    Not on file   Social History Narrative    Not on file Current Outpatient Medications   Medication Sig Dispense Refill    pramipexole (MIRAPEX) 0.125 mg tablet Take 1 Tab by mouth nightly. 90 Tab 1    gabapentin (NEURONTIN) 100 mg capsule Take 2 Caps by mouth once for 1 dose. Max Daily Amount: 200 mg. 180 Cap 1    lisinopril (PRINIVIL, ZESTRIL) 10 mg tablet Take 1 Tab by mouth daily. 90 Tab 1    traZODone (DESYREL) 50 mg tablet Take 1 Tab by mouth nightly. 90 Tab 1    [START ON 12/26/2019] HYDROcodone-acetaminophen (NORCO) 5-325 mg per tablet Take 1 Tab by mouth every eight (8) hours as needed for Pain for up to 3 days. Max Daily Amount: 3 Tabs. 45 Tab 0    benzocaine 20 % liqd Instill 4 to 5 drops of otic solution into external ear canal of affected ear(s), repeat every 1 to 2 hours if necessary 9 mL 0    fluticasone (FLONASE) 50 mcg/actuation nasal spray 1 [place] by Both Nostrils route daily.  meloxicam (MOBIC) 15 mg tablet Take 15 mg by mouth daily. Allergies   Allergen Reactions    Alendronate Unknown (comments)    Doxycycline Unknown (comments)    Erythromycin Unknown (comments)    Nsaids (Non-Steroidal Anti-Inflammatory Drug) Unknown (comments)    Pcn [Penicillins] Unknown (comments)       Vitals:    10/23/19 1054   BP: 148/70   Pulse: 63   Resp: 16   Temp: 98.6 °F (37 °C)   TempSrc: Oral   SpO2: 98%   Weight: 106 lb 8 oz (48.3 kg)   Height: 5' 3\" (1.6 m)     Body mass index is 18.87 kg/m². Objective  Physical Exam   Constitutional: She is oriented to person, place, and time. She appears well-developed and well-nourished. No distress. Eyes: Conjunctivae are normal.   Neck: Neck supple. No thyromegaly present. Cardiovascular: Normal rate, regular rhythm and normal heart sounds. Exam reveals no gallop and no friction rub. No murmur heard. Pulmonary/Chest: Effort normal and breath sounds normal. No respiratory distress. She has no wheezes. She has no rales. Musculoskeletal: She exhibits no edema.    Lymphadenopathy: She has no cervical adenopathy. Neurological: She is alert and oriented to person, place, and time. Skin: She is not diaphoretic. Psychiatric: She has a normal mood and affect. Her behavior is normal. Judgment and thought content normal.   Nursing note and vitals reviewed.

## 2020-01-10 ENCOUNTER — TELEPHONE (OUTPATIENT)
Dept: FAMILY MEDICINE CLINIC | Age: 80
End: 2020-01-10

## 2020-01-10 NOTE — TELEPHONE ENCOUNTER
----- Message from Bee Mcgee sent at 1/10/2020 11:02 AM EST -----  Regarding: Dr. Kristine Wu General Message/Vendor Calls    Caller's first and last name: Aggie with ([address])      Reason for call: regarding needing prior authorization for Rx gabapentin 100mg.       Call back required yes/no and why: Yes       Best contact number(s): [phone] Ref# [id number]      Details to clarify the request:      Bee Mcgee

## 2020-01-17 ENCOUNTER — DOCUMENTATION ONLY (OUTPATIENT)
Dept: FAMILY MEDICINE CLINIC | Age: 80
End: 2020-01-17

## 2020-01-17 NOTE — PROGRESS NOTES
PA for Gbapentin & Primipexole  Faxed to [facility] [employer] @ [phone]

## 2020-01-22 ENCOUNTER — OFFICE VISIT (OUTPATIENT)
Dept: FAMILY MEDICINE CLINIC | Age: 80
End: 2020-01-22

## 2020-01-22 VITALS
HEIGHT: 63 IN | SYSTOLIC BLOOD PRESSURE: 161 MMHG | HEART RATE: 80 BPM | WEIGHT: 105.5 LBS | RESPIRATION RATE: 16 BRPM | OXYGEN SATURATION: 98 % | DIASTOLIC BLOOD PRESSURE: 81 MMHG | BODY MASS INDEX: 18.69 KG/M2 | TEMPERATURE: 98.4 F

## 2020-01-22 DIAGNOSIS — G51.8 FACIAL NEURALGIA: Primary | ICD-10-CM

## 2020-01-22 DIAGNOSIS — I10 ESSENTIAL HYPERTENSION: ICD-10-CM

## 2020-01-22 RX ORDER — HYDROCODONE BITARTRATE AND ACETAMINOPHEN 5; 325 MG/1; MG/1
1 TABLET ORAL
Qty: 45 TAB | Refills: 0 | Status: SHIPPED | OUTPATIENT
Start: 2020-03-22 | End: 2020-04-20 | Stop reason: SDUPTHER

## 2020-01-22 RX ORDER — GABAPENTIN 100 MG/1
100 CAPSULE ORAL 2 TIMES DAILY
COMMUNITY
End: 2020-04-20 | Stop reason: SDUPTHER

## 2020-01-22 RX ORDER — HYDROCODONE BITARTRATE AND ACETAMINOPHEN 5; 325 MG/1; MG/1
1 TABLET ORAL
Qty: 45 TAB | Refills: 0 | Status: SHIPPED | OUTPATIENT
Start: 2020-02-22 | End: 2020-01-22 | Stop reason: SDUPTHER

## 2020-01-22 RX ORDER — HYDROCODONE BITARTRATE AND ACETAMINOPHEN 5; 325 MG/1; MG/1
1 TABLET ORAL
Qty: 45 TAB | Refills: 0 | Status: SHIPPED | OUTPATIENT
Start: 2020-01-22 | End: 2020-01-22 | Stop reason: SDUPTHER

## 2020-01-22 NOTE — PROGRESS NOTES
Identified pt with two pt identifiers(name and ). Reviewed record in preparation for visit and have obtained necessary documentation. Chief Complaint   Patient presents with    Hypertension    Facial Pain        Health Maintenance Due   Topic    DTaP/Tdap/Td series (1 - Tdap)    Shingrix Vaccine Age 50> (1 of 2)    Influenza Age 5 to Adult     GLAUCOMA SCREENING Q2Y     MEDICARE YEARLY EXAM        Coordination of Care Questionnaire:  :   1) Have you been to an emergency room, urgent care, or hospitalized since your last visit? If yes, where when, and reason for visit? No       2. Have seen or consulted any other health care provider since your last visit?  If yes, where when, and reason for visit?  no

## 2020-01-22 NOTE — PROGRESS NOTES
Assessment and Plan    1. 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 Averaging 1.5 tabs per day over a month   checked, OK. Nothing suggestive of abuse, misuse or diversion. FU q3 months. 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 When that is completed will attempt to further taper narcotic.  - HYDROcodone-acetaminophen (NORCO) 5-325 mg per tablet; Take 1 Tab by mouth every eight (8) hours as needed for Pain for up to 30 days. Max Daily Amount: 3 Tabs. Dispense: 45 Tab; Refill: 0    2. Essential hypertension  Not at goal over last 3 visits. Patient reluctant to increase meds after previous syncope and facial fracture. She agrees to see nurses for BP checks weekly and regroup after that. Follow-up and Dispositions    · Return in about 3 months (around 4/22/2020) for Medication follow up. Diagnosis and plan discussed with patient who verbillized understanding. History of present illness:Ying Laughlin is a [address] y.o. female presenting for Hypertension and Facial Pain    Hypertension  Not at goal today  Remains on lisinopril  Checks at home:  Unsure what BP's have been. Pain medication  For nerve entrapment face  1-1.5 pills per day  Occasionally able to go without meds. Review of Systems   Respiratory: Negative for shortness of breath. Cardiovascular: Negative for chest pain and palpitations. Gastrointestinal: Negative for blood in stool. Genitourinary: Negative for hematuria. Musculoskeletal: Negative for falls. Neurological: Positive for sensory change. Psychiatric/Behavioral: Negative for depression, hallucinations, substance abuse and suicidal ideas. The patient is nervous/anxious.           Past Medical History:   Diagnosis Date    Allergic rhinitis     Carpal tunnel syndrome     Facial pain 7/18/2018    Fibromyalgia     Generalized osteoarthritis 7/18/2018    GERD (gastroesophageal reflux disease)     Hyperlipidemia 7/18/2018    Hypertension     IBS (irritable bowel syndrome)     Lower back pain 7/18/2018    Osteoporosis 7/18/2018     Past Surgical History:   Procedure Laterality Date    HX COLONOSCOPY  01/2010    HX HYSTERECTOMY      HX OTHER SURGICAL      lumbar disc surgery    HX SALPINGO-OOPHORECTOMY       Family History   Problem Relation Age of Onset    Stroke Mother     Heart Attack Father     Hypertension Brother     Inflammatory Bowel Dz Brother      Social History     Socioeconomic History    Marital status:      Spouse name: Not on file    Number of children: Not on file    Years of education: Not on file    Highest education level: Not on file   Occupational History    Not on file   Social Needs    Financial resource strain: Not on file    Food insecurity:     Worry: Not on file     Inability: Not on file    Transportation needs:     Medical: Not on file     Non-medical: Not on file   Tobacco Use    Smoking status: Never Smoker    Smokeless tobacco: Never Used   Substance and Sexual Activity    Alcohol use: No     Frequency: Never    Drug use: No    Sexual activity: Yes   Lifestyle    Physical activity:     Days per week: Not on file     Minutes per session: Not on file    Stress: Not on file   Relationships    Social connections:     Talks on phone: Not on file     Gets together: Not on file     Attends Quaker service: Not on file     Active member of club or organization: Not on file     Attends meetings of clubs or organizations: Not on file     Relationship status: Not on file    Intimate partner violence:     Fear of current or ex partner: Not on file     Emotionally abused: Not on file     Physically abused: Not on file     Forced sexual activity: Not on file   Other Topics Concern    Not on file   Social History Narrative    Not on file         Prior to Admission medications    Medication Sig Start Date End Date Taking?  Authorizing Provider gabapentin (NEURONTIN) 100 mg capsule Take 100 mg by mouth two (2) times a day. Yes Provider, Historical   HYDROcodone-acetaminophen (NORCO) 5-325 mg per tablet Take 1 Tab by mouth every eight (8) hours as needed for Pain for up to 30 days. Max Daily Amount: 3 Tabs. 3/22/20 4/21/20 Yes Nat Pineda MD   pramipexole (MIRAPEX) 0.125 mg tablet Take 1 Tab by mouth nightly. 10/23/19  Yes Nat Pineda MD   lisinopril (PRINIVIL, ZESTRIL) 10 mg tablet Take 1 Tab by mouth daily. 10/23/19  Yes Nat Pineda MD   traZODone (DESYREL) 50 mg tablet Take 1 Tab by mouth nightly. 10/23/19  Yes Nat Pineda MD   benzocaine 20 % liqd Instill 4 to 5 drops of otic solution into external ear canal of affected ear(s), repeat every 1 to 2 hours if necessary 2/20/19  Yes Ham Morris MD   fluticasone (FLONASE) 50 mcg/actuation nasal spray 1 [place] by Both Nostrils route daily. Yes Provider, Historical   meloxicam (MOBIC) 15 mg tablet Take 15 mg by mouth daily. Yes Provider, Historical   HYDROcodone-acetaminophen (NORCO) 5-325 mg per tablet Take 1 Tab by mouth every eight (8) hours as needed for Pain for up to 30 days. Max Daily Amount: 3 Tabs. 1/22/20 1/22/20  Nat Pineda MD   HYDROcodone-acetaminophen (NORCO) 5-325 mg per tablet Take 1 Tab by mouth every eight (8) hours as needed for Pain for up to 30 days. Max Daily Amount: 3 Tabs. 2/22/20 1/22/20  Nat Pineda MD        Allergies   Allergen Reactions    Alendronate Unknown (comments)    Doxycycline Unknown (comments)    Erythromycin Unknown (comments)    Nsaids (Non-Steroidal Anti-Inflammatory Drug) Unknown (comments)    Pcn [Penicillins] Unknown (comments)       Vitals:    01/22/20 0923   BP: 161/81   Pulse: 80   Resp: 16   Temp: 98.4 °F (36.9 °C)   TempSrc: Oral   SpO2: 98%   Weight: 105 lb 8 oz (47.9 kg)   Height: 5' 3\" (1.6 m)     Body mass index is 18.69 kg/m².       Objective  Physical Exam  Vitals signs and nursing note reviewed. Constitutional:       Appearance: Normal appearance. She is not toxic-appearing. HENT:      Head: Normocephalic and atraumatic. Neck:      Musculoskeletal: No muscular tenderness. Cardiovascular:      Rate and Rhythm: Normal rate and regular rhythm. Heart sounds: Normal heart sounds. No murmur. No gallop. Pulmonary:      Effort: Pulmonary effort is normal. No respiratory distress. Breath sounds: Normal breath sounds. No wheezing, rhonchi or rales. Lymphadenopathy:      Cervical: No cervical adenopathy. Neurological:      Mental Status: She is alert. Psychiatric:         Mood and Affect: Mood normal.         Behavior: Behavior normal.         Thought Content:  Thought content normal.         Judgment: Judgment normal.

## 2020-04-20 ENCOUNTER — VIRTUAL VISIT (OUTPATIENT)
Dept: FAMILY MEDICINE CLINIC | Age: 80
End: 2020-04-20

## 2020-04-20 ENCOUNTER — TELEPHONE (OUTPATIENT)
Dept: FAMILY MEDICINE CLINIC | Age: 80
End: 2020-04-20

## 2020-04-20 VITALS
WEIGHT: 105 LBS | HEIGHT: 63 IN | SYSTOLIC BLOOD PRESSURE: 163 MMHG | DIASTOLIC BLOOD PRESSURE: 59 MMHG | BODY MASS INDEX: 18.61 KG/M2

## 2020-04-20 DIAGNOSIS — G25.81 RESTLESS LEGS SYNDROME: ICD-10-CM

## 2020-04-20 DIAGNOSIS — I10 ESSENTIAL HYPERTENSION: ICD-10-CM

## 2020-04-20 DIAGNOSIS — K21.9 GASTROESOPHAGEAL REFLUX DISEASE WITHOUT ESOPHAGITIS: ICD-10-CM

## 2020-04-20 DIAGNOSIS — K59.1 FUNCTIONAL DIARRHEA: ICD-10-CM

## 2020-04-20 DIAGNOSIS — G51.8 FACIAL NEURALGIA: Primary | ICD-10-CM

## 2020-04-20 RX ORDER — MELOXICAM 15 MG/1
15 TABLET ORAL DAILY
Qty: 90 TAB | Refills: 1 | Status: SHIPPED | OUTPATIENT
Start: 2020-04-20 | End: 2020-09-08

## 2020-04-20 RX ORDER — DIPHENOXYLATE HYDROCHLORIDE AND ATROPINE SULFATE 2.5; .025 MG/1; MG/1
1 TABLET ORAL
Qty: 30 TAB | Refills: 1 | Status: SHIPPED | OUTPATIENT
Start: 2020-04-20 | End: 2021-11-11 | Stop reason: SDUPTHER

## 2020-04-20 RX ORDER — PHENOL/SODIUM PHENOLATE
20 AEROSOL, SPRAY (ML) MUCOUS MEMBRANE DAILY
Qty: 90 TAB | Refills: 1 | Status: SHIPPED | OUTPATIENT
Start: 2020-04-20 | End: 2021-11-11

## 2020-04-20 RX ORDER — HYDROCODONE BITARTRATE AND ACETAMINOPHEN 5; 325 MG/1; MG/1
1 TABLET ORAL
Qty: 45 TAB | Refills: 0 | Status: SHIPPED | OUTPATIENT
Start: 2020-04-20 | End: 2020-04-20 | Stop reason: SDUPTHER

## 2020-04-20 RX ORDER — PRAMIPEXOLE DIHYDROCHLORIDE 0.12 MG/1
0.12 TABLET ORAL
Qty: 90 TAB | Refills: 1 | Status: SHIPPED | OUTPATIENT
Start: 2020-04-20 | End: 2020-07-27

## 2020-04-20 RX ORDER — DIPHENOXYLATE HYDROCHLORIDE AND ATROPINE SULFATE 2.5; .025 MG/1; MG/1
1 TABLET ORAL
Qty: 30 TAB | Refills: 1 | Status: SHIPPED | OUTPATIENT
Start: 2020-04-20 | End: 2020-04-20 | Stop reason: SDUPTHER

## 2020-04-20 RX ORDER — HYDROCODONE BITARTRATE AND ACETAMINOPHEN 5; 325 MG/1; MG/1
1 TABLET ORAL
Qty: 45 TAB | Refills: 0 | Status: SHIPPED | OUTPATIENT
Start: 2020-05-20 | End: 2020-04-20 | Stop reason: SDUPTHER

## 2020-04-20 RX ORDER — GABAPENTIN 100 MG/1
100 CAPSULE ORAL 3 TIMES DAILY
Qty: 270 CAP | Refills: 0 | Status: SHIPPED | OUTPATIENT
Start: 2020-04-20 | End: 2020-07-27

## 2020-04-20 RX ORDER — HYDROCODONE BITARTRATE AND ACETAMINOPHEN 5; 325 MG/1; MG/1
1 TABLET ORAL
Qty: 45 TAB | Refills: 0 | Status: SHIPPED | OUTPATIENT
Start: 2020-06-20 | End: 2020-07-17 | Stop reason: SDUPTHER

## 2020-04-20 RX ORDER — LISINOPRIL 10 MG/1
10 TABLET ORAL DAILY
Qty: 90 TAB | Refills: 1 | Status: SHIPPED | OUTPATIENT
Start: 2020-04-20 | End: 2020-07-27 | Stop reason: SDUPTHER

## 2020-04-20 NOTE — PROGRESS NOTES
Identified pt with two pt identifiers(name and ). Reviewed record in preparation for visit and have obtained necessary documentation. Chief Complaint   Patient presents with    Medication Refill        Health Maintenance Due   Topic    DTaP/Tdap/Td series (1 - Tdap)    Shingrix Vaccine Age 49> (1 of 2)    GLAUCOMA SCREENING Q2Y     Medicare Yearly Exam        Visit Vitals  Blood Pressure 163/59   Height 5' 3\" (1.6 m)   Weight 105 lb (47.6 kg)   Body Mass Index 18.60 kg/m²         Coordination of Care Questionnaire:  :   1) Have you been to an emergency room, urgent care, or hospitalized since your last visit? NO      2. Have seen or consulted any other health care provider since your last visit? NO          Patient is accompanied by SELF I have received verbal consent from Jennifer Sylvester to discuss any/all medical information while they are present in the room.

## 2020-04-20 NOTE — PROGRESS NOTES
Consent: Fanny Weems, who was seen by synchronous (real-time) audio-video technology, and/or her healthcare decision maker, is aware that this patient-initiated, Telehealth encounter on 4/20/2020 is a billable service, with coverage as determined by her insurance carrier. She is aware that she may receive a bill and has provided verbal consent to proceed: Yes. Assessment & Plan:   Diagnoses and all orders for this visit:    1. Facial neuralgia  -     meloxicam (MOBIC) 15 mg tablet; Take 1 Tab by mouth daily.  -     gabapentin (NEURONTIN) 100 mg capsule; Take 1 Cap by mouth three (3) times daily. Max Daily Amount: 300 mg.  -     HYDROcodone-acetaminophen (NORCO) 5-325 mg per tablet; Take 1 Tab by mouth every eight (8) hours as needed for Pain for up to 30 days. Max Daily Amount: 3 Tabs. 3 separate rx's written for pain medication for April May and June.(hydrocodone.  checked and is ok without signs of misuse or abuse/diversion. 2. Essential hypertension  -     lisinopriL (PRINIVIL, ZESTRIL) 10 mg tablet; Take 1 Tab by mouth daily. Unclear control  Recheck in clinic after covid restrictions lifted    3. Restless legs syndrome  -     pramipexole (Mirapex) 0.125 mg tablet; Take 1 Tab by mouth nightly. Apparently this helps overall with sleep. To resume. 4. Gastroesophageal reflux disease without esophagitis  -     Omeprazole delayed release (PRILOSEC D/R) 20 mg tablet; Take 1 Tab by mouth daily. Recurrence of sx  Refilled    5. Functional diarrhea  -     diphenoxylate-atropine (LomotiL) 2.5-0.025 mg per tablet; Take 1 Tab by mouth four (4) times daily as needed for Diarrhea. Max Daily Amount: 4 Tabs. Taking prn for severe episodic diarrhea. Follow-up and Dispositions    · Return in about 3 months (around 7/20/2020) for Medication follow up, Blood pressure follow up.            I spent at least 25 minutes with this established patient, and >50% of the time was spent counseling and/or coordinating care regarding facial pain, other medications, pain meds, sleep. [id number]  Subjective:   Candis Claude is a 78 y.o. female who was seen for Medication Refill    Hypertension  Varying home BP's from 120's to 160's   Admits very upset by covid restrictions and dealing with pain and sleep disturbance. Chronic pain. Still having a lot of facial and leg pain  Keeps her up at night  Taking 3 gabapentin per day 1 in AM and 2 at bedtime. Not taking trazodone and mirapex due to work about sedation  Takes one hydrocodone at bedtime too. [place]  Return of sx after stopping PPI  Wants refill  Still has intermittent severe diarrhea with negative workup in past.        Prior to Admission medications    Medication Sig Start Date End Date Taking? Authorizing Provider   lisinopriL (PRINIVIL, ZESTRIL) 10 mg tablet Take 1 Tab by mouth daily. 4/20/20  Yes Jacky Guillory MD   meloxicam (MOBIC) 15 mg tablet Take 1 Tab by mouth daily. 4/20/20  Yes Jacky Guillory MD   pramipexole (Mirapex) 0.125 mg tablet Take 1 Tab by mouth nightly. 4/20/20  Yes Jacky Guillory MD   gabapentin (NEURONTIN) 100 mg capsule Take 1 Cap by mouth three (3) times daily. Max Daily Amount: 300 mg. 4/20/20  Yes Jacky Guillory MD   Omeprazole delayed release (PRILOSEC D/R) 20 mg tablet Take 1 Tab by mouth daily. 4/20/20  Yes Jacky Gulilory MD   diphenoxylate-atropine (LomotiL) 2.5-0.025 mg per tablet Take 1 Tab by mouth four (4) times daily as needed for Diarrhea. Max Daily Amount: 4 Tabs. 4/20/20  Yes Jacky Guillory MD   HYDROcodone-acetaminophen [facility]) 5-325 mg per tablet Take 1 Tab by mouth every eight (8) hours as needed for Pain for up to 30 days. Max Daily Amount: 3 Tabs. 6/20/20 7/20/20 Yes Jacky Guillory MD   traZODone (DESYREL) 50 mg tablet Take 1 Tab by mouth nightly.  10/23/19  Yes Jacky Guillory MD   benzocaine 20 % liqd Instill 4 to 5 drops of otic solution into external ear canal of affected ear(s), repeat every 1 to 2 hours if necessary 2/20/19  Yes Clarissa Grimm MD   fluticasone [facility]) 50 mcg/actuation nasal spray 1 [place] by Both Nostrils route daily. Yes Provider, Historical   diphenoxylate-atropine (LomotiL) 2.5-0.025 mg per tablet Take 1 Tab by mouth four (4) times daily as needed for Diarrhea. Max Daily Amount: 4 Tabs. 4/20/20 4/20/20  Henna Michele MD   HYDROcodone-acetaminophen (NORCO) 5-325 mg per tablet Take 1 Tab by mouth every eight (8) hours as needed for Pain for up to 30 days. Max Daily Amount: 3 Tabs. 4/20/20 4/20/20  Henna Michele MD   HYDROcodone-acetaminophen (NORCO) 5-325 mg per tablet Take 1 Tab by mouth every eight (8) hours as needed for Pain for up to 30 days. Max Daily Amount: 3 Tabs. 5/20/20 4/20/20  Henna Michele MD   gabapentin (NEURONTIN) 100 mg capsule Take 100 mg by mouth two (2) times a day. 4/20/20  Provider, Historical   HYDROcodone-acetaminophen (NORCO) 5-325 mg per tablet Take 1 Tab by mouth every eight (8) hours as needed for Pain for up to 30 days. Max Daily Amount: 3 Tabs. 3/22/20 4/20/20  Henna Michele MD   pramipexole (MIRAPEX) 0.125 mg tablet Take 1 Tab by mouth nightly. 10/23/19 4/20/20  Henna Michele MD   lisinopril (PRINIVIL, ZESTRIL) 10 mg tablet Take 1 Tab by mouth daily. 10/23/19 4/20/20  Henna Michele MD   meloxicam (MOBIC) 15 mg tablet Take 15 mg by mouth daily. 4/20/20  Provider, Historical     Allergies   Allergen Reactions    Alendronate Unknown (comments)    Doxycycline Unknown (comments)    Erythromycin Unknown (comments)    Nsaids (Non-Steroidal Anti-Inflammatory Drug) Unknown (comments)    Pcn [Penicillins] Unknown (comments)           Review of Systems   Constitutional: Negative for chills and fever. Respiratory: Negative for shortness of breath. Cardiovascular: Negative for chest pain and palpitations. Gastrointestinal: Negative. Genitourinary: Negative.     Musculoskeletal: Positive for back pain, joint pain and myalgias. Neurological: Positive for tingling and sensory change. Negative for weakness. Psychiatric/Behavioral: Negative for depression. The patient is nervous/anxious and has insomnia. Objective:   Vital Signs: (As obtained by patient/caregiver at home)  Visit Vitals  /59   Ht 5' 3\" (1.6 m)   Wt 105 lb (47.6 kg)   BMI 18.60 kg/m²        [INSTRUCTIONS:  \"[x]\" Indicates a positive item  \"[]\" Indicates a negative item  -- DELETE ALL ITEMS NOT EXAMINED]    Constitutional: [x] Appears well-developed and well-nourished [x] No apparent distress      [] Abnormal -     Mental status: [x] Alert and awake  [x] Oriented to person/place/time [x] Able to follow commands    [] Abnormal -     Eyes:   EOM    [x]  Normal    [] Abnormal -   Sclera  [x]  Normal    [] Abnormal -          Discharge [x]  None visible   [] Abnormal -     HENT: [x] Normocephalic, atraumatic  [] Abnormal -   [x] Mouth/Throat: Mucous membranes are moist    External Ears [x] Normal  [] Abnormal -    Neck: [x] No visualized mass [] Abnormal -     Pulmonary/Chest: [x] Respiratory effort normal   [x] No visualized signs of difficulty breathing or respiratory distress        [] Abnormal -      Musculoskeletal:   [x] Normal gait with no signs of ataxia         [x] Normal range of motion of neck        [] Abnormal -     Neurological:        [x] No Facial Asymmetry (Cranial nerve 7 motor function) (limited exam due to video visit)          [x] No gaze palsy        [] Abnormal -          Skin:        [x] No significant exanthematous lesions or discoloration noted on facial skin         [] Abnormal -            Psychiatric:       [x] Normal Affect [] Abnormal -        [x] No Hallucinations    Other pertinent observable physical exam findings:-        We discussed the expected course, resolution and complications of the diagnosis(es) in detail.   Medication risks, benefits, costs, interactions, and alternatives were discussed as indicated. I advised her to contact the office if her condition worsens, changes or fails to improve as anticipated. She expressed understanding with the diagnosis(es) and plan. Zayda Zaragoza is a 78 y.o. female being evaluated by a video visit encounter for concerns as above. A caregiver was present when appropriate. Due to this being a TeleHealth encounter (During [place] [address] public health emergency), evaluation of the following organ systems was limited: Vitals/Constitutional/EENT/Resp/CV/GI//MS/Neuro/Skin/Heme-Lymph-Imm. Pursuant to the emergency declaration under the [facility], 1135 waiver authority and the [employer] and [employer] Act, this Virtual  Visit was conducted, with patient's (and/or legal guardian's) consent, to reduce the patient's risk of exposure to COVID-19 and provide necessary medical care. Services were provided through a video synchronous discussion virtually to substitute for in-person clinic visit. Patient at home and I was in office.       Karin Almanza MD

## 2020-05-07 RX ORDER — FLUTICASONE PROPIONATE 50 MCG
1 SPRAY, SUSPENSION (ML) NASAL DAILY
Qty: 1 BOTTLE | Refills: 5 | Status: SHIPPED | OUTPATIENT
Start: 2020-05-07 | End: 2021-01-26

## 2020-07-17 DIAGNOSIS — G51.8 FACIAL NEURALGIA: ICD-10-CM

## 2020-07-17 RX ORDER — HYDROCODONE BITARTRATE AND ACETAMINOPHEN 5; 325 MG/1; MG/1
1 TABLET ORAL
Qty: 45 TAB | Refills: 0 | Status: SHIPPED | OUTPATIENT
Start: 2020-07-17 | End: 2020-07-27 | Stop reason: SDUPTHER

## 2020-07-17 NOTE — TELEPHONE ENCOUNTER
07/17/20  1:19 PM    1. Facial neuralgia  Has appt next week   checked  Using gabapentin and 1 1/2 norco's per month on average  Also intermittent use of Lomotil.  - HYDROcodone-acetaminophen (NORCO) 5-325 mg per tablet; Take 1 Tab by mouth every eight (8) hours as needed for Pain for up to 30 days. Max Daily Amount: 3 Tabs. Dispense: 45 Tab;  Refill: 0  FU as scheduled    Monroe Jarrell MD

## 2020-07-27 ENCOUNTER — VIRTUAL VISIT (OUTPATIENT)
Dept: FAMILY MEDICINE CLINIC | Age: 80
End: 2020-07-27

## 2020-07-27 DIAGNOSIS — I10 ESSENTIAL HYPERTENSION: ICD-10-CM

## 2020-07-27 DIAGNOSIS — G25.81 RESTLESS LEGS SYNDROME: ICD-10-CM

## 2020-07-27 DIAGNOSIS — G51.8 FACIAL NEURALGIA: ICD-10-CM

## 2020-07-27 RX ORDER — LISINOPRIL 10 MG/1
10 TABLET ORAL 2 TIMES DAILY
Qty: 180 TAB | Refills: 1 | Status: SHIPPED | OUTPATIENT
Start: 2020-07-27 | End: 2020-10-19 | Stop reason: SDUPTHER

## 2020-07-27 RX ORDER — PRAMIPEXOLE DIHYDROCHLORIDE 0.12 MG/1
0.25 TABLET ORAL
Qty: 90 TAB | Refills: 1
Start: 2020-07-27 | End: 2020-10-19 | Stop reason: SDUPTHER

## 2020-07-27 RX ORDER — HYDROCODONE BITARTRATE AND ACETAMINOPHEN 5; 325 MG/1; MG/1
1 TABLET ORAL
Qty: 45 TAB | Refills: 0 | Status: SHIPPED | OUTPATIENT
Start: 2020-08-17 | End: 2020-07-27 | Stop reason: SDUPTHER

## 2020-07-27 RX ORDER — HYDROCODONE BITARTRATE AND ACETAMINOPHEN 5; 325 MG/1; MG/1
1 TABLET ORAL
Qty: 45 TAB | Refills: 0 | Status: SHIPPED | OUTPATIENT
Start: 2020-09-17 | End: 2020-10-17

## 2020-07-27 RX ORDER — GABAPENTIN 100 MG/1
400 CAPSULE ORAL
Qty: 270 CAP | Refills: 0
Start: 2020-07-27 | End: 2020-10-19

## 2020-07-27 NOTE — PROGRESS NOTES
Identified pt with two pt identifiers(name and ). Reviewed record in preparation for visit and have obtained necessary documentation. Chief Complaint   Patient presents with    Medication Evaluation     Discuss BP, Trazodone         Health Maintenance Due   Topic    DTaP/Tdap/Td series (1 - Tdap)    Shingrix Vaccine Age 49> (1 of 2)    GLAUCOMA SCREENING Q2Y     Medicare Yearly Exam        Coordination of Care Questionnaire:  :   1) Have you been to an emergency room, urgent care, or hospitalized since your last visit? If yes, where when, and reason for visit? no      2. Have seen or consulted any other health care provider since your last visit? If yes, where when, and reason for visit?  no        Patient is accompanied by self I have received verbal consent from Melecio Fishman to discuss any/all medical information while they are present in the room.

## 2020-07-27 NOTE — PROGRESS NOTES
Kymberly Porras is a 78 y.o. female who was seen by synchronous (real-time) audio-video technology on 2020. Consent: Kymberly Porras, who was seen by synchronous (real-time) audio-video technology, and/or her healthcare decision maker, is aware that this patient-initiated, Telehealth encounter on 2020 is a billable service, with coverage as determined by her insurance carrier. She is aware that she may receive a bill and has provided verbal consent to proceed: Yes. Assessment & Plan:   Diagnoses and all orders for this visit:    1. Restless legs syndrome  -     pramipexole (Mirapex) 0.125 mg tablet; Take 2 Tabs by mouth nightly. And insomnia. DC trazodone, increase gabapentin to 400 mg qhs    2. Facial neuralgia  -     gabapentin (NEURONTIN) 100 mg capsule; Take 4 Caps by mouth nightly. Max Daily Amount: 400 mg.  -     HYDROcodone-acetaminophen (NORCO) 5-325 mg per tablet; Take 1 Tab by mouth every eight (8) hours as needed for Pain for up to 30 days. Max Daily Amount: 3 Tabs. 3. Essential hypertension  -     lisinopriL (PRINIVIL, ZESTRIL) 10 mg tablet; Take 1 Tab by mouth two (2) times a day. Increase BP meds to BID consistently, not PRN  FU 3 months re BP and meds. Follow-up and Dispositions    · Return in about 3 months (around 10/27/2020) for Medication follow up, Blood pressure follow up. I spent at least 23 minutes on this visit with this established patient. ([id number])    Subjective:   Kymberly Porras is a 78 y.o. female who was seen for Medication Evaluation (Discuss BP, Trazodone )    Hypertension  Not at goal by home BP's   Some stress lately selling  brother's home and traveling to [address] [place] [address] some extra doses of BP meds when BP was up  No current sx. Restless legs still a problem  Does not sleep well  Thinks mirapex helps  Does not think trazodone does  On gabapentin at bedtime which helps with pain control    Chronic pain  Needs additional refill.   Given early refill of med pending appointment. Taking 1.5 total hydrocodone per day. Prior to Admission medications    Medication Sig Start Date End Date Taking? Authorizing Provider   pramipexole (Mirapex) 0.125 mg tablet Take 2 Tabs by mouth nightly. 7/27/20  Yes Jing Ibarra MD   gabapentin (NEURONTIN) 100 mg capsule Take 4 Caps by mouth nightly. Max Daily Amount: 400 mg. 7/27/20  Yes Jing Ibarra MD   lisinopriL (PRINIVIL, ZESTRIL) 10 mg tablet Take 1 Tab by mouth two (2) times a day. 7/27/20  Yes Jing Ibarra MD   HYDROcodone-acetaminophen [facility]) 5-325 mg per tablet Take 1 Tab by mouth every eight (8) hours as needed for Pain for up to 30 days. Max Daily Amount: 3 Tabs. 9/17/20 10/17/20 Yes Jing Ibarra MD   fluticasone propionate (FLONASE) 50 mcg/actuation nasal spray 1 [place] by Both Nostrils route daily. 5/7/20  Yes Jing Ibarra MD   meloxicam (MOBIC) 15 mg tablet Take 1 Tab by mouth daily. 4/20/20  Yes Jing Ibarra MD   Omeprazole delayed release (PRILOSEC D/R) 20 mg tablet Take 1 Tab by mouth daily. 4/20/20  Yes Jing Ibarra MD   diphenoxylate-atropine (LomotiL) 2.5-0.025 mg per tablet Take 1 Tab by mouth four (4) times daily as needed for Diarrhea. Max Daily Amount: 4 Tabs. 4/20/20  Yes Jing Ibarra MD   benzocaine 20 % liqd Instill 4 to 5 drops of otic solution into external ear canal of affected ear(s), repeat every 1 to 2 hours if necessary 2/20/19  Yes Shalonda Montemayor MD   HYDROcodone-acetaminophen [facility]) 5-325 mg per tablet Take 1 Tab by mouth every eight (8) hours as needed for Pain for up to 30 days. Max Daily Amount: 3 Tabs. 8/17/20 7/27/20  Jing Ibarra MD   HYDROcodone-acetaminophen (NORCO) 5-325 mg per tablet Take 1 Tab by mouth every eight (8) hours as needed for Pain for up to 30 days. Max Daily Amount: 3 Tabs. 7/17/20 7/27/20  Jing Ibarra MD   lisinopriL (PRINIVIL, ZESTRIL) 10 mg tablet Take 1 Tab by mouth daily.  4/20/20 7/27/20  Yfn Galan MD   pramipexole (Mirapex) 0.125 mg tablet Take 1 Tab by mouth nightly. 4/20/20 7/27/20  Yfn Galan MD   gabapentin (NEURONTIN) 100 mg capsule Take 1 Cap by mouth three (3) times daily. Max Daily Amount: 300 mg. 4/20/20 7/27/20  Yfn Galan MD   traZODone (DESYREL) 50 mg tablet Take 1 Tab by mouth nightly. 10/23/19 7/27/20  Yfn Galan MD     Allergies   Allergen Reactions    Alendronate Unknown (comments)    Doxycycline Unknown (comments)    Erythromycin Unknown (comments)    Nsaids (Non-Steroidal Anti-Inflammatory Drug) Unknown (comments)    Pcn [Penicillins] Unknown (comments)           Review of Systems   Respiratory: Negative for shortness of breath. Cardiovascular: Negative for chest pain. Musculoskeletal: Positive for joint pain. Negative for falls. Neurological: Positive for sensory change and headaches. Negative for dizziness, focal weakness, seizures, loss of consciousness and weakness. Psychiatric/Behavioral: Negative for depression. The patient is nervous/anxious and has insomnia. Objective:   Vital Signs: (As obtained by patient/caregiver at home)  There were no vitals taken for this visit.      [INSTRUCTIONS:  \"[x]\" Indicates a positive item  \"[]\" Indicates a negative item  -- DELETE ALL ITEMS NOT EXAMINED]    Constitutional: [x] Appears well-developed and well-nourished [x] No apparent distress      [] Abnormal -     Mental status: [x] Alert and awake  [x] Oriented to person/place/time [x] Able to follow commands    [] Abnormal -     Eyes:   EOM    [x]  Normal    [] Abnormal -   Sclera  [x]  Normal    [] Abnormal -          Discharge [x]  None visible   [] Abnormal -     HENT: [x] Normocephalic, atraumatic  [] Abnormal -   [x] Mouth/Throat: Mucous membranes are moist    External Ears [x] Normal  [] Abnormal -    Neck: [x] No visualized mass [] Abnormal -     Pulmonary/Chest: [x] Respiratory effort normal   [x] No visualized signs of difficulty breathing or respiratory distress        [] Abnormal -      Musculoskeletal:   [x] Normal gait with no signs of ataxia         [x] Normal range of motion of neck        [] Abnormal -     Neurological:        [x] No Facial Asymmetry (Cranial nerve 7 motor function) (limited exam due to video visit)          [x] No gaze palsy        [] Abnormal -          Skin:        [x] No significant exanthematous lesions or discoloration noted on facial skin         [] Abnormal -            Psychiatric:       [x] Normal Affect [] Abnormal -        [x] No Hallucinations    Other pertinent observable physical exam findings:-        We discussed the expected course, resolution and complications of the diagnosis(es) in detail. Medication risks, benefits, costs, interactions, and alternatives were discussed as indicated. I advised her to contact the office if her condition worsens, changes or fails to improve as anticipated. She expressed understanding with the diagnosis(es) and plan. Celeste Deleon is a 78 y.o. female who was evaluated by a video visit encounter for concerns as above. Patient identification was verified prior to start of the visit. A caregiver was present when appropriate. Due to this being a TeleHealth encounter (During [facility]-27 public health emergency), evaluation of the following organ systems was limited: Vitals/Constitutional/EENT/Resp/CV/GI//MS/Neuro/Skin/Heme-Lymph-Imm. Pursuant to the emergency declaration under the [facility], 1135 waiver authority and the [employer] and [employer] Act, this Virtual  Visit was conducted, with patient's (and/or legal guardian's) consent, to reduce the patient's risk of exposure to COVID-19 and provide necessary medical care. Services were provided through a video synchronous discussion virtually to substitute for in-person clinic visit.    Patient was at home and I was in office for this video visit. Reema Max.  Tico John MD

## 2020-09-05 DIAGNOSIS — G51.8 FACIAL NEURALGIA: ICD-10-CM

## 2020-09-08 RX ORDER — MELOXICAM 15 MG/1
TABLET ORAL
Qty: 90 TAB | Refills: 1 | Status: SHIPPED | OUTPATIENT
Start: 2020-09-08 | End: 2021-05-13

## 2020-10-19 ENCOUNTER — OFFICE VISIT (OUTPATIENT)
Dept: FAMILY MEDICINE CLINIC | Age: 80
End: 2020-10-19
Payer: MEDICARE

## 2020-10-19 VITALS
WEIGHT: 110.4 LBS | DIASTOLIC BLOOD PRESSURE: 91 MMHG | SYSTOLIC BLOOD PRESSURE: 191 MMHG | OXYGEN SATURATION: 98 % | HEIGHT: 63 IN | RESPIRATION RATE: 16 BRPM | HEART RATE: 65 BPM | BODY MASS INDEX: 19.56 KG/M2 | TEMPERATURE: 98 F

## 2020-10-19 DIAGNOSIS — G25.81 RESTLESS LEGS SYNDROME: ICD-10-CM

## 2020-10-19 DIAGNOSIS — G51.8 FACIAL NEURALGIA: Primary | ICD-10-CM

## 2020-10-19 DIAGNOSIS — I10 ESSENTIAL HYPERTENSION: ICD-10-CM

## 2020-10-19 PROCEDURE — 1101F PT FALLS ASSESS-DOCD LE1/YR: CPT | Performed by: FAMILY MEDICINE

## 2020-10-19 PROCEDURE — G8754 DIAS BP LESS 90: HCPCS | Performed by: FAMILY MEDICINE

## 2020-10-19 PROCEDURE — G8536 NO DOC ELDER MAL SCRN: HCPCS | Performed by: FAMILY MEDICINE

## 2020-10-19 PROCEDURE — 1090F PRES/ABSN URINE INCON ASSESS: CPT | Performed by: FAMILY MEDICINE

## 2020-10-19 PROCEDURE — G8427 DOCREV CUR MEDS BY ELIG CLIN: HCPCS | Performed by: FAMILY MEDICINE

## 2020-10-19 PROCEDURE — G8753 SYS BP > OR = 140: HCPCS | Performed by: FAMILY MEDICINE

## 2020-10-19 PROCEDURE — G8510 SCR DEP NEG, NO PLAN REQD: HCPCS | Performed by: FAMILY MEDICINE

## 2020-10-19 PROCEDURE — G8420 CALC BMI NORM PARAMETERS: HCPCS | Performed by: FAMILY MEDICINE

## 2020-10-19 PROCEDURE — 99214 OFFICE O/P EST MOD 30 MIN: CPT | Performed by: FAMILY MEDICINE

## 2020-10-19 RX ORDER — LISINOPRIL 10 MG/1
10 TABLET ORAL 2 TIMES DAILY
Qty: 180 TAB | Refills: 1 | Status: SHIPPED | OUTPATIENT
Start: 2020-10-19 | End: 2021-07-01

## 2020-10-19 RX ORDER — TRAZODONE HYDROCHLORIDE 50 MG/1
TABLET ORAL
COMMUNITY
Start: 2020-10-07 | End: 2021-04-12

## 2020-10-19 RX ORDER — HYDROCODONE BITARTRATE AND ACETAMINOPHEN 5; 325 MG/1; MG/1
1 TABLET ORAL
Qty: 45 TAB | Refills: 0 | Status: SHIPPED | OUTPATIENT
Start: 2020-10-19 | End: 2020-10-19 | Stop reason: SDUPTHER

## 2020-10-19 RX ORDER — GABAPENTIN 100 MG/1
CAPSULE ORAL
Qty: 270 CAP | Refills: 1 | Status: SHIPPED | OUTPATIENT
Start: 2020-10-19 | End: 2020-11-09 | Stop reason: SDUPTHER

## 2020-10-19 RX ORDER — PRAMIPEXOLE DIHYDROCHLORIDE 0.12 MG/1
0.25 TABLET ORAL
Qty: 180 TAB | Refills: 1 | Status: SHIPPED | OUTPATIENT
Start: 2020-10-19 | End: 2020-11-09

## 2020-10-19 RX ORDER — HYDROCODONE BITARTRATE AND ACETAMINOPHEN 5; 325 MG/1; MG/1
1 TABLET ORAL
Qty: 45 TAB | Refills: 0 | Status: SHIPPED | OUTPATIENT
Start: 2020-10-19 | End: 2020-11-18

## 2020-10-19 RX ORDER — AMLODIPINE BESYLATE 2.5 MG/1
2.5 TABLET ORAL DAILY
Qty: 90 TAB | Refills: 1 | Status: SHIPPED | OUTPATIENT
Start: 2020-10-19 | End: 2021-02-22

## 2020-10-19 NOTE — PROGRESS NOTES
Assessment and Plan    1. Facial neuralgia  This has been a good overall routine for her minimizing pain and meds but allowing her to be functional   checked and is ok  No sign of diversion or misuse  - gabapentin (NEURONTIN) 100 mg capsule; One by mouth in AM and two by mouth at bedtime. Dispense: 270 Cap; Refill: 1  - HYDROcodone-acetaminophen (NORCO) 5-325 mg per tablet; Take 1 Tab by mouth every eight (8) hours as needed for Pain for up to 30 days. Max Daily Amount: 3 Tabs. Dispense: 45 Tab; Refill: 0    2. Restless legs syndrome  Doing well  - pramipexole (Mirapex) 0.125 mg tablet; Take 2 Tabs by mouth nightly. Dispense: 180 Tab; Refill: 1    3. Essential hypertension  Add amlodipine. FU one month  Will add meds slowly with previous episode of syncope and fractured humerus  - lisinopriL (PRINIVIL, ZESTRIL) 10 mg tablet; Take 1 Tab by mouth two (2) times a day. Dispense: 180 Tab; Refill: 1  - amLODIPine (NORVASC) 2.5 mg tablet; Take 1 Tab by mouth daily. Dispense: 90 Tab; Refill: 1      Follow-up and Dispositions    · Return in about 3 months (around 1/19/2021) for Medication follow up, Blood pressure follow up. Diagnosis and plan discussed with patient who verbillized understanding. History of present illness:Ying Laughlin is a [de-identified] y.o. female presenting for Medication Refill and Blood Pressure Check    Hypertension  BP's 207-120 systolic at home  Relaxes for 10-15 minutes before she checks it. Lisinopril 10 mg BID  Consistently takes meds. Lots of stress taking care of brother's estate. 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 Review of Systems   Constitutional: Negative for chills and fever. HENT: Negative for congestion and sore throat.     Respiratory: Negative for cough and shortness of breath. Cardiovascular: Negative for chest pain and palpitations. Neurological: Negative for seizures and loss of consciousness. Psychiatric/Behavioral: The patient is nervous/anxious.           Past Medical History:   Diagnosis Date    Allergic rhinitis     Carpal tunnel syndrome     Facial pain 7/18/2018    Fibromyalgia     Generalized osteoarthritis 7/18/2018    GERD (gastroesophageal reflux disease)     Hyperlipidemia 7/18/2018    Hypertension     IBS (irritable bowel syndrome)     Lower back pain 7/18/2018    Osteoporosis 7/18/2018     Past Surgical History:   Procedure Laterality Date    HX COLONOSCOPY  01/2010    HX HYSTERECTOMY      HX OTHER SURGICAL      lumbar disc surgery    HX SALPINGO-OOPHORECTOMY       Family History   Problem Relation Age of Onset    Stroke Mother     Heart Attack Father     Hypertension Brother     Inflammatory Bowel Dz Brother      Social History     Socioeconomic History    Marital status:      Spouse name: Not on file    Number of children: Not on file    Years of education: Not on file    Highest education level: Not on file   Occupational History    Not on file   Social Needs    Financial resource strain: Not on file    Food insecurity     Worry: Not on file     Inability: Not on file    Transportation needs     Medical: Not on file     Non-medical: Not on file   Tobacco Use    Smoking status: Never Smoker    Smokeless tobacco: Never Used   Substance and Sexual Activity    Alcohol use: No     Frequency: Never    Drug use: No    Sexual activity: Yes   Lifestyle    Physical activity     Days per week: Not on file     Minutes per session: Not on file    Stress: Not on file   Relationships    Social connections     Talks on phone: Not on file     Gets together: Not on file     Attends Faith service: Not on file     Active member of club or organization: Not on file     Attends meetings of clubs or organizations: Not on file     Relationship status: Not on file    Intimate partner violence     Fear of current or ex partner: Not on file     Emotionally abused: Not on file     Physically abused: Not on file     Forced sexual activity: Not on file   Other Topics Concern    Not on file   Social History Narrative    Not on file         Prior to Admission medications    Medication Sig Start Date End Date Taking? Authorizing Provider   gabapentin (NEURONTIN) 100 mg capsule One by mouth in AM and two by mouth at bedtime. 10/19/20  Yes Sherie Ferris MD   pramipexole (Mirapex) 0.125 mg tablet Take 2 Tabs by mouth nightly. 10/19/20  Yes Sherie Ferris MD   lisinopriL (PRINIVIL, ZESTRIL) 10 mg tablet Take 1 Tab by mouth two (2) times a day. 10/19/20  Yes Sherie Ferris MD   amLODIPine (NORVASC) 2.5 mg tablet Take 1 Tab by mouth daily. 10/19/20  Yes Sherie Ferris MD   HYDROcodone-acetaminophen [facility]) 5-325 mg per tablet Take 1 Tab by mouth every eight (8) hours as needed for Pain for up to 30 days. Max Daily Amount: 3 Tabs. 10/19/20 11/18/20 Yes Sherie Ferris MD   traZODone (DESYREL) 50 mg tablet  10/7/20   Abel Reis   HYDROcodone-acetaminophen (NORCO) 5-325 mg per tablet Take 1 Tab by mouth every eight (8) hours as needed for Pain for up to 30 days. Max Daily Amount: 3 Tabs. 10/19/20 10/19/20  Sherie Ferris MD   HYDROcodone-acetaminophen (NORCO) 5-325 mg per tablet Take 1 Tab by mouth every eight (8) hours as needed for Pain for up to 30 days. Max Daily Amount: 3 Tabs. 10/19/20 10/19/20  Sherie Ferris MD   omeprazole (PRILOSEC) 20 mg capsule TAKE 1 CAPSULE EVERY DAY 10/14/20   Sherie Ferris MD   meloxicam (MOBIC) 15 mg tablet TAKE 1 TABLET EVERY DAY 9/8/20   Sherie Ferris MD   pramipexole (Mirapex) 0.125 mg tablet Take 2 Tabs by mouth nightly. 7/27/20 10/19/20  Sherie Ferris MD   gabapentin (NEURONTIN) 100 mg capsule Take 4 Caps by mouth nightly.  Max Daily Amount: 400 mg. 7/27/20 10/19/20  Rodri Jerry MD   lisinopriL (PRINIVIL, ZESTRIL) 10 mg tablet Take 1 Tab by mouth two (2) times a day. 7/27/20 10/19/20  Rodri Jerry MD   fluticasone propionate (FLONASE) 50 mcg/actuation nasal spray 1 [place] by Both Nostrils route daily. 5/7/20   Rodri Jerry MD   Omeprazole delayed release (PRILOSEC D/R) 20 mg tablet Take 1 Tab by mouth daily. 4/20/20   Rodri Jerry MD   diphenoxylate-atropine (LomotiL) 2.5-0.025 mg per tablet Take 1 Tab by mouth four (4) times daily as needed for Diarrhea. Max Daily Amount: 4 Tabs. 4/20/20   Rodri Jerry MD   benzocaine 20 % liqd Instill 4 to 5 drops of otic solution into external ear canal of affected ear(s), repeat every 1 to 2 hours if necessary 2/20/19   Hilton Valentino MD        Allergies   Allergen Reactions    Alendronate Unknown (comments)    Doxycycline Unknown (comments)    Erythromycin Unknown (comments)    Nsaids (Non-Steroidal Anti-Inflammatory Drug) Unknown (comments)    Pcn [Penicillins] Unknown (comments)       Vitals:    10/19/20 1336 10/19/20 1343 10/19/20 1344   BP: (!) 170/67 (!) 187/80 (!) 191/91   Pulse: 65     Resp: 16     Temp: 98 °F (36.7 °C)     TempSrc: Oral     SpO2: 98%     Weight: 110 lb 6.4 oz (50.1 kg)     Height: 5' 3\" (1.6 m)       Body mass index is 19.56 kg/m². Objective  Physical Exam  Vitals signs and nursing note reviewed. Constitutional:       Appearance: Normal appearance. She is not toxic-appearing. HENT:      Head: Normocephalic and atraumatic. Neck:      Musculoskeletal: No muscular tenderness. Cardiovascular:      Rate and Rhythm: Normal rate and regular rhythm. Heart sounds: Normal heart sounds. No murmur. No gallop. Pulmonary:      Effort: Pulmonary effort is normal. No respiratory distress. Breath sounds: Normal breath sounds. No wheezing, rhonchi or rales. Lymphadenopathy:      Cervical: No cervical adenopathy. Neurological:      Mental Status: She is alert. Psychiatric:         Mood and Affect: Mood normal.         Behavior: Behavior normal.         Thought Content: Thought content normal.         Judgment: Judgment normal.       Facial exam unchanged.

## 2020-10-19 NOTE — PROGRESS NOTES
Identified pt with two pt identifiers(name and ). Reviewed record in preparation for visit and have obtained necessary documentation. Chief Complaint   Patient presents with    Medication Refill    Blood Pressure Check        Health Maintenance Due   Topic    DTaP/Tdap/Td series (1 - Tdap)    Shingrix Vaccine Age 50> (1 of 2)    GLAUCOMA SCREENING Q2Y     Medicare Yearly Exam     Flu Vaccine (1)       Coordination of Care Questionnaire:  :   1) Have you been to an emergency room, urgent care, or hospitalized since your last visit? If yes, where when, and reason for visit? no      2. Have seen or consulted any other health care provider since your last visit? If yes, where when, and reason for visit?  no        Patient is accompanied by self I have received verbal consent from Didi Hanson to discuss any/all medical information while they are present in the room.

## 2021-01-21 ENCOUNTER — VIRTUAL VISIT (OUTPATIENT)
Dept: FAMILY MEDICINE CLINIC | Age: 81
End: 2021-01-21
Payer: MEDICARE

## 2021-01-21 DIAGNOSIS — F43.29 STRESS AND ADJUSTMENT REACTION: ICD-10-CM

## 2021-01-21 DIAGNOSIS — I10 ESSENTIAL HYPERTENSION: ICD-10-CM

## 2021-01-21 DIAGNOSIS — G51.8 FACIAL NEURALGIA: Primary | ICD-10-CM

## 2021-01-21 PROCEDURE — 99443 PR PHYS/QHP TELEPHONE EVALUATION 21-30 MIN: CPT | Performed by: FAMILY MEDICINE

## 2021-01-21 RX ORDER — HYDROCODONE BITARTRATE AND ACETAMINOPHEN 5; 325 MG/1; MG/1
1 TABLET ORAL
Qty: 45 TAB | Refills: 0 | Status: SHIPPED | OUTPATIENT
Start: 2021-03-21 | End: 2021-04-19 | Stop reason: SDUPTHER

## 2021-01-21 RX ORDER — HYDROCODONE BITARTRATE AND ACETAMINOPHEN 5; 325 MG/1; MG/1
1 TABLET ORAL
Qty: 45 TAB | Refills: 0 | Status: SHIPPED | OUTPATIENT
Start: 2021-01-21 | End: 2021-01-21 | Stop reason: SDUPTHER

## 2021-01-21 RX ORDER — HYDROCODONE BITARTRATE AND ACETAMINOPHEN 5; 325 MG/1; MG/1
1 TABLET ORAL
Qty: 45 TAB | Refills: 0 | Status: SHIPPED | OUTPATIENT
Start: 2021-02-21 | End: 2021-01-21 | Stop reason: SDUPTHER

## 2021-01-21 NOTE — PROGRESS NOTES
Devi Serrano is a [de-identified] y.o. female      Chief Complaint   Patient presents with    Medication Refill    Hypertension         1. Have you been to the ER, urgent care clinic since your last visit? Hospitalized since your last visit? no      2. Have you seen or consulted any other health care providers outside of the [address] [place] [address] since your last visit? Include any pap smears or colon screening.   no

## 2021-01-21 NOTE — PROGRESS NOTES
Micah Hong is a [de-identified] y.o. female evaluated via telephone on 1/21/2021. Consent:  She and/or health care decision maker is aware that she may receive a bill for this telephone service, depending on her insurance coverage, and has provided verbal consent to proceed: Yes    Unable to connect for scheduled video visit so entire visit done by phone    Documentation:  I communicated with the patient and/or health care decision maker about HTN, meds, COVID vaccine. Details of this discussion including any medical advice provided:     Should she get COVID vaccine? Worries about side effects. Some family members have had [place]. Hypertension  Takes amlodipine at night and ACE in AM  Home BP's 130-145/50-60  Still a lot of stress, settling brother's estate    Chronic pain   Still takes gabapentin 300 mg QHS  Trazadone and pramapexole  Taking 1.5 hydrocodone per day for facial pain  Restless legs    Assessment and Plan:    1. Facial neuralgia  Still averaging 1.5 tabs of Hydrocodone per day. Remains on gabapentin.  OK. No signs of misuse or abuse  - HYDROcodone-acetaminophen (NORCO) 5-325 mg per tablet; Take 1 Tab by mouth every eight (8) hours as needed for Pain for up to 30 days. Max Daily Amount: 3 Tabs. Dispense: 45 Tab; Refill: 0    2. Essential hypertension  Better and now close to goal by home BP's  Get BP checked by nurses here and see me in 3 months. We will do labs at that time    3. Stress and adjustment reaction  Discussed stressors  Answered questions about COVID vaccine. I recommend it for her based on her age and medical issues. Follow-up and Dispositions    · Return in about 3 months (around 4/21/2021) for Medication follow up. I affirm this is a Patient Initiated Episode with a Patient who has not had a related appointment within my department in the past 7 days or scheduled within the next 24 hours.     Total Time: minutes: 21-30 minutes    Note: not billable if this call serves to triage the patient into an appointment for the relevant concern    The patient was at home and I was in office for this phone visit.     Donald Orosco MD

## 2021-02-20 DIAGNOSIS — I10 ESSENTIAL HYPERTENSION: ICD-10-CM

## 2021-02-22 RX ORDER — AMLODIPINE BESYLATE 2.5 MG/1
TABLET ORAL
Qty: 90 TAB | Refills: 1 | Status: SHIPPED | OUTPATIENT
Start: 2021-02-22 | End: 2021-11-11 | Stop reason: SDUPTHER

## 2021-04-16 ENCOUNTER — TELEPHONE (OUTPATIENT)
Dept: FAMILY MEDICINE CLINIC | Age: 81
End: 2021-04-16

## 2021-04-16 DIAGNOSIS — G51.8 FACIAL NEURALGIA: ICD-10-CM

## 2021-04-16 NOTE — TELEPHONE ENCOUNTER
Pt has scheduled, but she would like to know if you are willing to sent in a short supply of her pain medication because she will be out on the 20th. Please advise         ----- Message from Marcy Hickey sent at 4/16/2021  8:30 AM EDT -----  Regarding: Dr. Marlee Ruiz Message/Vendor Calls    Caller's first and last name: Pt      Reason for call: Requesting an med f/u appt and blood work. Stated been waiting since Monday for a call.        Callback required yes/no and why: Yes      Best contact number(s): [phone]      Details to clarify the request:      Marcy Hickey

## 2021-04-19 RX ORDER — HYDROCODONE BITARTRATE AND ACETAMINOPHEN 5; 325 MG/1; MG/1
1 TABLET ORAL
Qty: 45 TAB | Refills: 0 | Status: SHIPPED | OUTPATIENT
Start: 2021-04-19 | End: 2021-04-28 | Stop reason: SDUPTHER

## 2021-04-19 NOTE — TELEPHONE ENCOUNTER
Call her.   I still want to see her but sent in a month's supply of medicine to last until she sees me  [facility]

## 2021-04-19 NOTE — TELEPHONE ENCOUNTER
Refill of hydrocodone for facial pain. Has appointment but runs out prior to seeing me.   [facility]

## 2021-04-28 ENCOUNTER — OFFICE VISIT (OUTPATIENT)
Dept: FAMILY MEDICINE CLINIC | Age: 81
End: 2021-04-28
Payer: MEDICARE

## 2021-04-28 ENCOUNTER — HOSPITAL ENCOUNTER (OUTPATIENT)
Dept: GENERAL RADIOLOGY | Age: 81
Discharge: HOME | End: 2021-04-28
Attending: FAMILY MEDICINE
Payer: MEDICARE

## 2021-04-28 VITALS
BODY MASS INDEX: 19.95 KG/M2 | TEMPERATURE: 98.6 F | DIASTOLIC BLOOD PRESSURE: 75 MMHG | HEART RATE: 76 BPM | WEIGHT: 112.6 LBS | OXYGEN SATURATION: 97 % | RESPIRATION RATE: 16 BRPM | HEIGHT: 63 IN | SYSTOLIC BLOOD PRESSURE: 177 MMHG

## 2021-04-28 DIAGNOSIS — M48.00 SPINAL STENOSIS, UNSPECIFIED SPINAL REGION: ICD-10-CM

## 2021-04-28 DIAGNOSIS — E78.2 MIXED HYPERLIPIDEMIA: ICD-10-CM

## 2021-04-28 DIAGNOSIS — T88.7XXA MEDICATION SIDE EFFECTS: ICD-10-CM

## 2021-04-28 DIAGNOSIS — G51.8 FACIAL NEURALGIA: ICD-10-CM

## 2021-04-28 DIAGNOSIS — E53.8 VITAMIN B12 DEFICIENCY: ICD-10-CM

## 2021-04-28 DIAGNOSIS — I10 ESSENTIAL HYPERTENSION: Primary | ICD-10-CM

## 2021-04-28 DIAGNOSIS — G25.81 RESTLESS LEGS SYNDROME: ICD-10-CM

## 2021-04-28 DIAGNOSIS — R20.2 PARESTHESIA: ICD-10-CM

## 2021-04-28 LAB
ALBUMIN SERPL-MCNC: 4.4 G/DL (ref 3.5–5)
ALBUMIN/GLOB SERPL: 1.3 {RATIO} (ref 1.1–2.2)
ALP SERPL-CCNC: 80 U/L (ref 45–117)
ALT SERPL-CCNC: 14 U/L (ref 12–78)
ANION GAP SERPL CALC-SCNC: 6 MMOL/L (ref 5–15)
AST SERPL-CCNC: 17 U/L (ref 15–37)
BASOPHILS # BLD: 0.1 K/UL (ref 0–0.1)
BASOPHILS NFR BLD: 1 % (ref 0–1)
BILIRUB DIRECT SERPL-MCNC: 0.2 MG/DL (ref 0–0.2)
BILIRUB SERPL-MCNC: 0.5 MG/DL (ref 0.2–1)
BUN SERPL-MCNC: 13 MG/DL (ref 6–20)
BUN/CREAT SERPL: 16 (ref 12–20)
CALCIUM SERPL-MCNC: 10.2 MG/DL (ref 8.5–10.1)
CHLORIDE SERPL-SCNC: 106 MMOL/L (ref 97–108)
CO2 SERPL-SCNC: 28 MMOL/L (ref 21–32)
COMMENT, HOLDF: NORMAL
CREAT SERPL-MCNC: 0.82 MG/DL (ref 0.55–1.02)
CREAT UR-MCNC: 20.1 MG/DL
DIFFERENTIAL METHOD BLD: NORMAL
EOSINOPHIL # BLD: 0 K/UL (ref 0–0.4)
EOSINOPHIL NFR BLD: 1 % (ref 0–7)
ERYTHROCYTE [DISTWIDTH] IN BLOOD BY AUTOMATED COUNT: 12.8 % (ref 11.5–14.5)
ERYTHROCYTE [SEDIMENTATION RATE] IN BLOOD: 12 MM/HR (ref 0–30)
FERRITIN SERPL-MCNC: 64 NG/ML (ref 26–388)
FOLATE SERPL-MCNC: 21.4 NG/ML (ref 5–21)
GLOBULIN SER CALC-MCNC: 3.4 G/DL (ref 2–4)
GLUCOSE SERPL-MCNC: 90 MG/DL (ref 65–100)
HCT VFR BLD AUTO: 39.6 % (ref 35–47)
HGB BLD-MCNC: 12.7 G/DL (ref 11.5–16)
IMM GRANULOCYTES # BLD AUTO: 0 K/UL (ref 0–0.04)
IMM GRANULOCYTES NFR BLD AUTO: 0 % (ref 0–0.5)
IRON SATN MFR SERPL: 27 % (ref 20–50)
IRON SERPL-MCNC: 94 UG/DL (ref 35–150)
LYMPHOCYTES # BLD: 1.1 K/UL (ref 0.8–3.5)
LYMPHOCYTES NFR BLD: 17 % (ref 12–49)
MCH RBC QN AUTO: 31.4 PG (ref 26–34)
MCHC RBC AUTO-ENTMCNC: 32.1 G/DL (ref 30–36.5)
MCV RBC AUTO: 97.8 FL (ref 80–99)
MICROALBUMIN UR-MCNC: 0.55 MG/DL
MICROALBUMIN/CREAT UR-RTO: 27 MG/G (ref 0–30)
MONOCYTES # BLD: 0.4 K/UL (ref 0–1)
MONOCYTES NFR BLD: 6 % (ref 5–13)
NEUTS SEG # BLD: 4.8 K/UL (ref 1.8–8)
NEUTS SEG NFR BLD: 75 % (ref 32–75)
NRBC # BLD: 0 K/UL (ref 0–0.01)
NRBC BLD-RTO: 0 PER 100 WBC
PLATELET # BLD AUTO: 182 K/UL (ref 150–400)
PMV BLD AUTO: 12 FL (ref 8.9–12.9)
POTASSIUM SERPL-SCNC: 4.6 MMOL/L (ref 3.5–5.1)
PROT SERPL-MCNC: 7.8 G/DL (ref 6.4–8.2)
RBC # BLD AUTO: 4.05 M/UL (ref 3.8–5.2)
SAMPLES BEING HELD,HOLD: NORMAL
SODIUM SERPL-SCNC: 140 MMOL/L (ref 136–145)
TIBC SERPL-MCNC: 343 UG/DL (ref 250–450)
TSH SERPL DL<=0.05 MIU/L-ACNC: 2.63 UIU/ML (ref 0.36–3.74)
VIT B12 SERPL-MCNC: 311 PG/ML (ref 193–986)
WBC # BLD AUTO: 6.5 K/UL (ref 3.6–11)

## 2021-04-28 PROCEDURE — G8753 SYS BP > OR = 140: HCPCS | Performed by: FAMILY MEDICINE

## 2021-04-28 PROCEDURE — G8754 DIAS BP LESS 90: HCPCS | Performed by: FAMILY MEDICINE

## 2021-04-28 PROCEDURE — 99214 OFFICE O/P EST MOD 30 MIN: CPT | Performed by: FAMILY MEDICINE

## 2021-04-28 PROCEDURE — G8510 SCR DEP NEG, NO PLAN REQD: HCPCS | Performed by: FAMILY MEDICINE

## 2021-04-28 PROCEDURE — 72100 X-RAY EXAM L-S SPINE 2/3 VWS: CPT

## 2021-04-28 PROCEDURE — G8420 CALC BMI NORM PARAMETERS: HCPCS | Performed by: FAMILY MEDICINE

## 2021-04-28 PROCEDURE — 1090F PRES/ABSN URINE INCON ASSESS: CPT | Performed by: FAMILY MEDICINE

## 2021-04-28 PROCEDURE — 1101F PT FALLS ASSESS-DOCD LE1/YR: CPT | Performed by: FAMILY MEDICINE

## 2021-04-28 PROCEDURE — G8427 DOCREV CUR MEDS BY ELIG CLIN: HCPCS | Performed by: FAMILY MEDICINE

## 2021-04-28 PROCEDURE — 72110 X-RAY EXAM L-2 SPINE 4/>VWS: CPT

## 2021-04-28 PROCEDURE — G8536 NO DOC ELDER MAL SCRN: HCPCS | Performed by: FAMILY MEDICINE

## 2021-04-28 RX ORDER — HYDROCODONE BITARTRATE AND ACETAMINOPHEN 5; 325 MG/1; MG/1
1 TABLET ORAL
Qty: 45 TAB | Refills: 0 | Status: SHIPPED | OUTPATIENT
Start: 2021-05-19 | End: 2021-04-28 | Stop reason: SDUPTHER

## 2021-04-28 RX ORDER — HYDROCODONE BITARTRATE AND ACETAMINOPHEN 5; 325 MG/1; MG/1
1 TABLET ORAL
Qty: 45 TAB | Refills: 0 | Status: SHIPPED | OUTPATIENT
Start: 2021-06-19 | End: 2021-05-12 | Stop reason: SDUPTHER

## 2021-04-28 NOTE — PROGRESS NOTES
Identified pt with two pt identifiers(name and ). Reviewed record in preparation for visit and have obtained necessary documentation. Chief Complaint   Patient presents with    Hypertension    Medication Refill        Health Maintenance Due   Topic    COVID-19 Vaccine (1)    DTaP/Tdap/Td series (1 - Tdap)    Shingrix Vaccine Age 49> (1 of 2)    Medicare Yearly Exam        Coordination of Care Questionnaire:  :   1) Have you been to an emergency room, urgent care, or hospitalized since your last visit? If yes, where when, and reason for visit? No       2. Have seen or consulted any other health care provider since your last visit? If yes, where when, and reason for visit?    No

## 2021-04-28 NOTE — PROGRESS NOTES
Aviva Barnes  [de-identified] y.o. female  1940  [id number]  [facility] MEDICINE  Progress Note     Encounter Date: 4/28/2021    Assessment and Plan:     Encounter Diagnoses     ICD-10-CM ICD-9-CM   1. Essential hypertension  I10 401.9   2. Restless legs syndrome  G25.81 333.94   3. Paresthesia  R20.2 782.0   4. Vitamin B12 deficiency  E53.8 266.2   5. Spinal stenosis, unspecified spinal region  M48.00 724.00   6. Medication side effects  T88. 7XXA 995.20   7. Mixed hyperlipidemia  E78.2 272.2   8. 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 Essential hypertension  BP's better at home. Continue meds  Do not think amlodipine is responsible for her sx  - METABOLIC PANEL, BASIC; Future  - MICROALBUMIN, UR, RAND W/ MICROALB/CREAT RATIO; Future    2. Restless legs syndrome  Continue Mirapex, check iron status  - IRON PROFILE; Future  - FERRITIN; Future    3. Paresthesia  Current leg pains and sx may be due to restless legs or with previous spinal surgery to spinal stenosis  - VITAMIN B12 & FOLATE; Future  - SED RATE (ESR); Future  - TSH 3RD GENERATION; Future    4. Vitamin B12 deficiency  Check status  - VITAMIN B12 & FOLATE; Future    5. Spinal stenosis, unspecified spinal region  Check xrays with previous spinal surgery  - XR SPINE LUMB MIN 4 V; Future    6. Medication side effects  Check for other heme or metabolic abnormalities explaining sx  - CBC WITH AUTOMATED DIFF; Future    7. Mixed hyperlipidemia  - HEPATIC FUNCTION PANEL; Future    8. Facial neuralgia  Refill X 2 months for ongoing facial pain after fracture. - HYDROcodone-acetaminophen (NORCO) 5-325 mg per tablet; Take 1 Tab by mouth every eight (8) hours as needed for Pain for up to 30 days. Max Daily Amount: 3 Tabs. Dispense: 45 Tab; Refill: 0      I have discussed the diagnosis with the patient and the intended plan as seen in the above orders. she has expressed understanding.   The patient has received an after-visit summary and questions were answered concerning future plans. I have discussed medication side effects and warnings with the patient as well. Electronically Signed: Rk Saavedra MD    Current Medications after this visit     Current Outpatient Medications   Medication Sig    [START ON 6/19/2021] HYDROcodone-acetaminophen (NORCO) 5-325 mg per tablet Take 1 Tab by mouth every eight (8) hours as needed for Pain for up to 30 days. Max Daily Amount: 3 Tabs.  traZODone (DESYREL) 50 mg tablet TAKE 1 TABLET NIGHTLY    amLODIPine (NORVASC) 2.5 mg tablet TAKE 1 TABLET EVERY DAY    fluticasone propionate (FLONASE) 50 mcg/actuation nasal spray USE 1 SPRAY IN EACH NOSTRIL EVERY DAY    pramipexole (MIRAPEX) 0.125 mg tablet Take 2 Tabs by mouth nightly.  gabapentin (NEURONTIN) 100 mg capsule One by mouth in AM and two by mouth at bedtime.  lisinopriL (PRINIVIL, ZESTRIL) 10 mg tablet Take 1 Tab by mouth two (2) times a day.  omeprazole (PRILOSEC) 20 mg capsule TAKE 1 CAPSULE EVERY DAY    meloxicam (MOBIC) 15 mg tablet TAKE 1 TABLET EVERY DAY    Omeprazole delayed release (PRILOSEC D/R) 20 mg tablet Take 1 Tab by mouth daily.  diphenoxylate-atropine (LomotiL) 2.5-0.025 mg per tablet Take 1 Tab by mouth four (4) times daily as needed for Diarrhea. Max Daily Amount: 4 Tabs.  benzocaine 20 % liqd Instill 4 to 5 drops of otic solution into external ear canal of affected ear(s), repeat every 1 to 2 hours if necessary     No current facility-administered medications for this visit.       Medications Discontinued During This Encounter   Medication Reason    HYDROcodone-acetaminophen (NORCO) 5-325 mg per tablet REORDER    HYDROcodone-acetaminophen (NORCO) 5-325 mg per tablet REORDER     ~~~~~~~~~~~~~~~~~~~~~~~~~~~~~~~~~~~~~~~~~~~~~~~~~~~~~~~~~~~    Chief Complaint   Patient presents with    Hypertension    Medication Refill       History provided by patient  History of Present Illness   Saúl Turner is a [de-identified] y.o. female who presents to clinic today for:  Hypertension and Medication Refill    More nerve pain over the past five months despite taking Neurontin and mirapex  Worse when she is lying down or sitting. Does not bother her if she is busy. Legs jump, jerk little needle and sharp pains  Feels jittery  Arthritis is getting worse but that pain is different. Like restless legs but really bad. Mainly her legs. Not her arms. Previous lumbar back surgery    Hypertension  Amlodipine and lisinopril  Seems worse sx since went on amlodipine  BP's at home 117-159    Neurontin two BID  Mirapex one or two at bedtime. Still takes 1.5 hydrocodone per day. Needs refill    OK,  NO signs of misuse or diversion      Health Maintenance  Will do at future visit, Asked patient to schedule a Wellness appt to discuss issues. Health Maintenance Due   Topic Date Due    COVID-19 Vaccine (1) Never done    DTaP/Tdap/Td series (1 - Tdap) Never done    Shingrix Vaccine Age 50> (1 of 2) Never done    Medicare Yearly Exam  02/21/2020     Review of Systems   Review of Systems   Constitutional: Negative for chills, fever and weight loss. HENT: Negative for congestion and sore throat. Respiratory: Negative for cough and shortness of breath. Cardiovascular: Negative for chest pain. Gastrointestinal: Negative. Genitourinary: Negative. Musculoskeletal: Positive for back pain and joint pain. Neurological: Positive for tingling, sensory change, focal weakness and weakness. Psychiatric/Behavioral: Negative. Vitals/Objective:     Vitals:    04/28/21 1019 04/28/21 1025 04/28/21 1038   BP: (!) 200/81 (!) 203/80 (!) 177/75   Pulse: 76     Resp: 16     Temp: 98.6 °F (37 °C)     TempSrc: Temporal     SpO2: 97%     Weight: 112 lb 9.6 oz (51.1 kg)     Height: 5' 3\" (1.6 m)       Body mass index is 19.95 kg/m².     Wt Readings from Last 3 Encounters:   04/28/21 112 lb 9.6 oz (51.1 kg)   10/19/20 110 lb 6.4 oz (50.1 kg)   04/20/20 105 lb (47.6 kg) Objective  Physical Exam  Vitals signs and nursing note reviewed. Constitutional:       Appearance: Normal appearance. She is not toxic-appearing. HENT:      Head: Normocephalic and atraumatic. Neck:      Musculoskeletal: No muscular tenderness. Cardiovascular:      Rate and Rhythm: Normal rate and regular rhythm. Heart sounds: Normal heart sounds. No murmur. No gallop. Pulmonary:      Effort: Pulmonary effort is normal. No respiratory distress. Breath sounds: Normal breath sounds. No wheezing, rhonchi or rales. Lymphadenopathy:      Cervical: No cervical adenopathy. Neurological:      Mental Status: She is alert. Psychiatric:         Mood and Affect: Mood normal.         Behavior: Behavior normal.         Thought Content: Thought content normal.         Judgment: Judgment normal.       Lower extremities,  Good pulses in feet. No edema  Normal ROM knees and hips  Normal motor function of legs. Normal reflexes. No results found for this or any previous visit (from the past 24 hour(s)). Disposition     Follow-up and Dispositions  ·   Return in about 1 month (around 5/28/2021) for Review test results. Future Appointments   Date Time Provider Leonides Hickman   5/12/2021  9:40 AM Fiona Ascencio MD [facility]       History   Patient's past medical, surgical and family histories were reviewed and updated.     Past Medical History:   Diagnosis Date    Allergic rhinitis     Carpal tunnel syndrome     Facial pain 7/18/2018    Fibromyalgia     Generalized osteoarthritis 7/18/2018    GERD (gastroesophageal reflux disease)     Hyperlipidemia 7/18/2018    Hypertension     IBS (irritable bowel syndrome)     Lower back pain 7/18/2018    Osteoporosis 7/18/2018     Past Surgical History:   Procedure Laterality Date    HX COLONOSCOPY  01/2010    HX HYSTERECTOMY      HX OTHER SURGICAL      lumbar disc surgery    HX SALPINGO-OOPHORECTOMY       Family History   Problem Relation Age of Onset    Stroke Mother     Heart Attack Father     Hypertension Brother     Inflammatory Bowel Dz Brother      Social History     Tobacco Use    Smoking status: Never Smoker    Smokeless tobacco: Never Used   Substance Use Topics    Alcohol use: No     Frequency: Never    Drug use: No       Allergies     Allergies   Allergen Reactions    Alendronate Unknown (comments)    Doxycycline Unknown (comments)    Erythromycin Unknown (comments)    Nsaids (Non-Steroidal Anti-Inflammatory Drug) Unknown (comments)    Pcn [Penicillins] Unknown (comments)

## 2021-04-29 ENCOUNTER — TELEPHONE (OUTPATIENT)
Dept: FAMILY MEDICINE CLINIC | Age: 81
End: 2021-04-29

## 2021-04-29 NOTE — PROGRESS NOTES
Call Odette Slade. Xrays are OK and only show arthritis in her back from the previous surgery. Her labs are OK. See me as scheduled.   [facility]

## 2021-04-29 NOTE — TELEPHONE ENCOUNTER
----- Message from Padmini Sylvester MD sent at 4/29/2021  2:37 PM EDT -----  Call [facility]. Xrays are OK and only show arthritis in her back from the previous surgery. Her labs are OK. See me as scheduled.   [facility]

## 2021-04-30 ENCOUNTER — TELEPHONE (OUTPATIENT)
Dept: FAMILY MEDICINE CLINIC | Age: 81
End: 2021-04-30

## 2021-04-30 NOTE — TELEPHONE ENCOUNTER
Name and  verified      Spoke with patient about recent lab results, patient verbally understood and will f/u as scheduled

## 2021-04-30 NOTE — TELEPHONE ENCOUNTER
----- Message from Joe Turcios sent at 4/30/2021  9:10 AM EDT -----  Regarding: Dr. Jh Butcher Patient return call    Caller's first and last name and relationship (if not the patient):      Best contact number(s): [phone]      Whose call is being returned: Returning missed call from Pavithra Hernandez.        Details to clarify the request:      Joe Turcios

## 2021-05-12 ENCOUNTER — OFFICE VISIT (OUTPATIENT)
Dept: FAMILY MEDICINE CLINIC | Age: 81
End: 2021-05-12
Payer: MEDICARE

## 2021-05-12 VITALS
HEART RATE: 73 BPM | TEMPERATURE: 98.1 F | DIASTOLIC BLOOD PRESSURE: 68 MMHG | SYSTOLIC BLOOD PRESSURE: 156 MMHG | OXYGEN SATURATION: 96 % | WEIGHT: 112.6 LBS | HEIGHT: 63 IN | RESPIRATION RATE: 16 BRPM | BODY MASS INDEX: 19.95 KG/M2

## 2021-05-12 DIAGNOSIS — G89.29 CHRONIC PAIN OF RIGHT KNEE: ICD-10-CM

## 2021-05-12 DIAGNOSIS — G25.81 RESTLESS LEGS SYNDROME: ICD-10-CM

## 2021-05-12 DIAGNOSIS — G51.8 FACIAL NEURALGIA: Primary | ICD-10-CM

## 2021-05-12 DIAGNOSIS — M25.561 CHRONIC PAIN OF RIGHT KNEE: ICD-10-CM

## 2021-05-12 PROCEDURE — G8536 NO DOC ELDER MAL SCRN: HCPCS | Performed by: FAMILY MEDICINE

## 2021-05-12 PROCEDURE — 1101F PT FALLS ASSESS-DOCD LE1/YR: CPT | Performed by: FAMILY MEDICINE

## 2021-05-12 PROCEDURE — G8510 SCR DEP NEG, NO PLAN REQD: HCPCS | Performed by: FAMILY MEDICINE

## 2021-05-12 PROCEDURE — 1090F PRES/ABSN URINE INCON ASSESS: CPT | Performed by: FAMILY MEDICINE

## 2021-05-12 PROCEDURE — 99214 OFFICE O/P EST MOD 30 MIN: CPT | Performed by: FAMILY MEDICINE

## 2021-05-12 PROCEDURE — G8420 CALC BMI NORM PARAMETERS: HCPCS | Performed by: FAMILY MEDICINE

## 2021-05-12 PROCEDURE — G8753 SYS BP > OR = 140: HCPCS | Performed by: FAMILY MEDICINE

## 2021-05-12 PROCEDURE — G8754 DIAS BP LESS 90: HCPCS | Performed by: FAMILY MEDICINE

## 2021-05-12 PROCEDURE — G8427 DOCREV CUR MEDS BY ELIG CLIN: HCPCS | Performed by: FAMILY MEDICINE

## 2021-05-12 RX ORDER — HYDROCODONE BITARTRATE AND ACETAMINOPHEN 5; 325 MG/1; MG/1
1 TABLET ORAL
Qty: 45 TAB | Refills: 0 | Status: SHIPPED | OUTPATIENT
Start: 2021-06-19 | End: 2021-05-12 | Stop reason: SDUPTHER

## 2021-05-12 RX ORDER — HYDROCODONE BITARTRATE AND ACETAMINOPHEN 5; 325 MG/1; MG/1
1 TABLET ORAL
Qty: 45 TAB | Refills: 0 | Status: SHIPPED | OUTPATIENT
Start: 2021-07-19 | End: 2021-08-12 | Stop reason: SDUPTHER

## 2021-05-12 NOTE — PROGRESS NOTES
Identified pt with two pt identifiers(name and ). Reviewed record in preparation for visit and have obtained necessary documentation. Chief Complaint   Patient presents with    Hypertension        Health Maintenance Due   Topic    COVID-19 Vaccine (1)    DTaP/Tdap/Td series (1 - Tdap)    Shingrix Vaccine Age 49> (1 of 2)    Medicare Yearly Exam        Coordination of Care Questionnaire:  :   1) Have you been to an emergency room, urgent care, or hospitalized since your last visit? If yes, where when, and reason for visit? No       2. Have seen or consulted any other health care provider since your last visit? If yes, where when, and reason for visit?    No

## 2021-05-12 NOTE — PROGRESS NOTES
Spenser Ricketts  [de-identified] y.o. female  1940  [id number]  [facility], [place] MEDICINE  Progress Note     Encounter Date: 5/12/2021    Assessment and Plan:     Encounter Diagnoses     ICD-10-CM ICD-9-CM   1. 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 Restless legs syndrome  G25.81 333.94   3. Chronic pain of right knee  M25.561 719.46    G89.29 338.29       1. Facial neuralgia  Adequate pain control with 1.5 hydrocodone per day  Sent in new RX for 6/19 and 7/19  FU end of July.  OK  - HYDROcodone-acetaminophen (NORCO) 5-325 mg per tablet; Take 1 Tab by mouth every eight (8) hours as needed for Pain for up to 30 days. Max Daily Amount: 3 Tabs. Dispense: 45 Tab; Refill: 0    2. Restless legs syndrome  Better with current rx of 200 mg of Neurontin and .25 of Mirapex at bedtime. Refill at those doses when needs new rx.    3. Chronic pain of right knee  Likely DJD and may have joint mouse, if continued locking, FU for xrays and exam.      I have discussed the diagnosis with the patient and the intended plan as seen in the above orders. she has expressed understanding. The patient has received an after-visit summary and questions were answered concerning future plans. I have discussed medication side effects and warnings with the patient as well. Electronically Signed: Keegan Abdullahi MD    Current Medications after this visit     Current Outpatient Medications   Medication Sig    [START ON 7/19/2021] HYDROcodone-acetaminophen (NORCO) 5-325 mg per tablet Take 1 Tab by mouth every eight (8) hours as needed for Pain for up to 30 days. Max Daily Amount: 3 Tabs.  traZODone (DESYREL) 50 mg tablet TAKE 1 TABLET NIGHTLY    amLODIPine (NORVASC) 2.5 mg tablet TAKE 1 TABLET EVERY DAY    fluticasone propionate (FLONASE) 50 mcg/actuation nasal spray USE 1 SPRAY IN EACH NOSTRIL EVERY DAY    pramipexole (MIRAPEX) 0.125 mg tablet Take 2 Tabs by mouth nightly.     gabapentin (NEURONTIN) 100 mg capsule One by mouth in AM and two by mouth at bedtime.  lisinopriL (PRINIVIL, ZESTRIL) 10 mg tablet Take 1 Tab by mouth two (2) times a day.  omeprazole (PRILOSEC) 20 mg capsule TAKE 1 CAPSULE EVERY DAY    meloxicam (MOBIC) 15 mg tablet TAKE 1 TABLET EVERY DAY    Omeprazole delayed release (PRILOSEC D/R) 20 mg tablet Take 1 Tab by mouth daily.  diphenoxylate-atropine (LomotiL) 2.5-0.025 mg per tablet Take 1 Tab by mouth four (4) times daily as needed for Diarrhea. Max Daily Amount: 4 Tabs.  benzocaine 20 % liqd Instill 4 to 5 drops of otic solution into external ear canal of affected ear(s), repeat every 1 to 2 hours if necessary     No current facility-administered medications for this visit. Medications Discontinued During This Encounter   Medication Reason    HYDROcodone-acetaminophen (NORCO) 5-325 mg per tablet REORDER    HYDROcodone-acetaminophen (NORCO) 5-325 mg per tablet REORDER     ~~~~~~~~~~~~~~~~~~~~~~~~~~~~~~~~~~~~~~~~~~~~~~~~~~~~~~~~~~~    Chief Complaint   Patient presents with    Hypertension       History provided by patient  History of Present Illness   Jericho Cano is a [de-identified] y.o. female who presents to clinic today for:  Hypertension    Facial neuralgia and restless changes. Still has days that are terrible but some days no pain at all. Still has minor stimulation set off her face. Now on two gabapentin and two mirapex and 1/2 pain pill at bedtime  Gets six hours of sleep now and that's a big improvement  Was only sleeping an hour at a time  No real problems during the day. Now on 3-4 gabapentin per day. Hypertension   At home [phone] systolic    Left knee pain   Better if she wraps it which she did today. No injury past or present. Seems to lock and hurt. Then she almost falls    Health Maintenance  Will do at future visit, Asked patient to schedule a Wellness appt to discuss issues.   Health Maintenance Due   Topic Date Due    COVID-19 Vaccine (1) Never done    DTaP/Tdap/Td series (1 - Tdap) Never done    Shingrix Vaccine Age 50> (1 of 2) Never done    Medicare Yearly Exam  02/21/2020     Review of Systems   Review of Systems   Constitutional: Negative for chills, fever and weight loss. HENT: Negative for congestion and sore throat. Respiratory: Negative for cough and shortness of breath. Cardiovascular: Negative for chest pain and leg swelling. Musculoskeletal: Positive for back pain and joint pain. Psychiatric/Behavioral: Negative. Vitals/Objective:     Vitals:    05/12/21 0937 05/12/21 1028   BP: (!) 192/69 (!) 156/68   Pulse: 73    Resp: 16    Temp: 98.1 °F (36.7 °C)    TempSrc: Temporal    SpO2: 96%    Weight: 112 lb 9.6 oz (51.1 kg)    Height: 5' 3\" (1.6 m)      Body mass index is 19.95 kg/m². Wt Readings from Last 3 Encounters:   05/12/21 112 lb 9.6 oz (51.1 kg)   04/28/21 112 lb 9.6 oz (51.1 kg)   10/19/20 110 lb 6.4 oz (50.1 kg)         Objective  Physical Exam  Vitals signs and nursing note reviewed. Constitutional:       Appearance: Normal appearance. She is not toxic-appearing. HENT:      Head: Normocephalic and atraumatic. Neck:      Musculoskeletal: No muscular tenderness. Cardiovascular:      Rate and Rhythm: Normal rate and regular rhythm. Heart sounds: Normal heart sounds. No murmur. No gallop. Pulmonary:      Effort: Pulmonary effort is normal. No respiratory distress. Breath sounds: Normal breath sounds. No wheezing, rhonchi or rales. Lymphadenopathy:      Cervical: No cervical adenopathy. Neurological:      Mental Status: She is alert. Psychiatric:         Mood and Affect: Mood normal.         Behavior: Behavior normal.         Thought Content: Thought content normal.         Judgment: Judgment normal.           No results found for this or any previous visit (from the past 24 hour(s)).    Disposition     Future Appointments   Date Time Provider Leonides Hickman   8/12/2021  8:40 AM Bradley Thomas MD [facility] History   Patient's past medical, surgical and family histories were reviewed and updated.     Past Medical History:   Diagnosis Date    Allergic rhinitis     Carpal tunnel syndrome     Facial pain 7/18/2018    Fibromyalgia     Generalized osteoarthritis 7/18/2018    GERD (gastroesophageal reflux disease)     Hyperlipidemia 7/18/2018    Hypertension     IBS (irritable bowel syndrome)     Lower back pain 7/18/2018    Osteoporosis 7/18/2018     Past Surgical History:   Procedure Laterality Date    HX COLONOSCOPY  01/2010    HX HYSTERECTOMY      HX OTHER SURGICAL      lumbar disc surgery    HX SALPINGO-OOPHORECTOMY       Family History   Problem Relation Age of Onset    Stroke Mother     Heart Attack Father     Hypertension Brother     Inflammatory Bowel Dz Brother      Social History     Tobacco Use    Smoking status: Never Smoker    Smokeless tobacco: Never Used   Substance Use Topics    Alcohol use: No     Frequency: Never    Drug use: No       Allergies     Allergies   Allergen Reactions    Alendronate Unknown (comments)    Doxycycline Unknown (comments)    Erythromycin Unknown (comments)    Nsaids (Non-Steroidal Anti-Inflammatory Drug) Unknown (comments)    Pcn [Penicillins] Unknown (comments)

## 2021-05-13 DIAGNOSIS — G51.8 FACIAL NEURALGIA: ICD-10-CM

## 2021-05-13 RX ORDER — MELOXICAM 15 MG/1
TABLET ORAL
Qty: 90 TAB | Refills: 1 | Status: SHIPPED | OUTPATIENT
Start: 2021-05-13 | End: 2022-08-10 | Stop reason: SDUPTHER

## 2021-05-17 DIAGNOSIS — G25.81 RESTLESS LEGS SYNDROME: ICD-10-CM

## 2021-05-17 RX ORDER — PRAMIPEXOLE DIHYDROCHLORIDE 0.12 MG/1
TABLET ORAL
Qty: 180 TAB | Refills: 1 | Status: SHIPPED | OUTPATIENT
Start: 2021-05-17 | End: 2021-10-11

## 2021-07-01 DIAGNOSIS — I10 ESSENTIAL HYPERTENSION: ICD-10-CM

## 2021-07-01 RX ORDER — LISINOPRIL 10 MG/1
TABLET ORAL
Qty: 180 TABLET | Refills: 1 | Status: SHIPPED | OUTPATIENT
Start: 2021-07-01 | End: 2021-11-11 | Stop reason: SDUPTHER

## 2021-07-08 DIAGNOSIS — G51.8 FACIAL NEURALGIA: ICD-10-CM

## 2021-07-08 RX ORDER — GABAPENTIN 100 MG/1
CAPSULE ORAL
Qty: 270 CAPSULE | Refills: 1 | Status: SHIPPED | OUTPATIENT
Start: 2021-07-08 | End: 2021-11-11 | Stop reason: SDUPTHER

## 2021-07-08 NOTE — TELEPHONE ENCOUNTER
PCP: Rafat Roche MD    Last appt: 5/12/2021  Future Appointments   Date Time Provider Leonides Hickman   8/12/2021  8:40 AM Rafat Roche MD [facility] AMB       Requested Prescriptions     Pending Prescriptions Disp Refills    gabapentin (NEURONTIN) 100 mg capsule 270 Capsule 1     Sig: One by mouth in AM and two by mouth at bedtime.        Prior labs and Blood pressures:  BP Readings from Last 3 Encounters:   05/12/21 (!) 156/68   04/28/21 (!) 177/75   10/19/20 (!) 191/91     Lab Results   Component Value Date/Time    Sodium 140 04/28/2021 11:28 AM    Potassium 4.6 04/28/2021 11:28 AM    Chloride 106 04/28/2021 11:28 AM    CO2 28 04/28/2021 11:28 AM    Anion gap 6 04/28/2021 11:28 AM    Glucose 90 04/28/2021 11:28 AM    BUN 13 04/28/2021 11:28 AM    Creatinine 0.82 04/28/2021 11:28 AM    BUN/Creatinine ratio 16 04/28/2021 11:28 AM    GFR est AA >60 04/28/2021 11:28 AM    GFR est non-AA >60 04/28/2021 11:28 AM    Calcium 10.2 (H) 04/28/2021 11:28 AM     No results found for: HBA1C, CHG3CGPY, YIM7IGDW  Lab Results   Component Value Date/Time    Cholesterol, total 236 (H) 02/21/2019 08:22 AM    HDL Cholesterol 88 02/21/2019 08:22 AM    LDL, calculated 124 (H) 02/21/2019 08:22 AM    VLDL, calculated 24 02/21/2019 08:22 AM    Triglyceride 118 02/21/2019 08:22 AM     Lab Results   Component Value Date/Time    VITAMIN D, 25-HYDROXY 39.7 02/21/2019 08:22 AM       Lab Results   Component Value Date/Time    TSH 2.63 04/28/2021 11:28 AM

## 2021-08-12 ENCOUNTER — OFFICE VISIT (OUTPATIENT)
Dept: FAMILY MEDICINE CLINIC | Age: 81
End: 2021-08-12
Payer: MEDICARE

## 2021-08-12 VITALS
HEART RATE: 87 BPM | TEMPERATURE: 97.6 F | RESPIRATION RATE: 20 BRPM | DIASTOLIC BLOOD PRESSURE: 68 MMHG | SYSTOLIC BLOOD PRESSURE: 160 MMHG | OXYGEN SATURATION: 95 % | BODY MASS INDEX: 20.41 KG/M2 | HEIGHT: 63 IN | WEIGHT: 115.2 LBS

## 2021-08-12 DIAGNOSIS — G51.8 FACIAL NEURALGIA: ICD-10-CM

## 2021-08-12 DIAGNOSIS — Z00.00 MEDICARE ANNUAL WELLNESS VISIT, SUBSEQUENT: Primary | ICD-10-CM

## 2021-08-12 PROBLEM — F11.99 OPIOID USE, UNSPECIFIED WITH UNSPECIFIED OPIOID-INDUCED DISORDER (HCC): Status: ACTIVE | Noted: 2021-08-12

## 2021-08-12 PROCEDURE — 1101F PT FALLS ASSESS-DOCD LE1/YR: CPT | Performed by: FAMILY MEDICINE

## 2021-08-12 PROCEDURE — 1090F PRES/ABSN URINE INCON ASSESS: CPT | Performed by: FAMILY MEDICINE

## 2021-08-12 PROCEDURE — G8510 SCR DEP NEG, NO PLAN REQD: HCPCS | Performed by: FAMILY MEDICINE

## 2021-08-12 PROCEDURE — G8427 DOCREV CUR MEDS BY ELIG CLIN: HCPCS | Performed by: FAMILY MEDICINE

## 2021-08-12 PROCEDURE — G8753 SYS BP > OR = 140: HCPCS | Performed by: FAMILY MEDICINE

## 2021-08-12 PROCEDURE — G8754 DIAS BP LESS 90: HCPCS | Performed by: FAMILY MEDICINE

## 2021-08-12 PROCEDURE — G0439 PPPS, SUBSEQ VISIT: HCPCS | Performed by: FAMILY MEDICINE

## 2021-08-12 PROCEDURE — 99213 OFFICE O/P EST LOW 20 MIN: CPT | Performed by: FAMILY MEDICINE

## 2021-08-12 PROCEDURE — G8420 CALC BMI NORM PARAMETERS: HCPCS | Performed by: FAMILY MEDICINE

## 2021-08-12 PROCEDURE — G8536 NO DOC ELDER MAL SCRN: HCPCS | Performed by: FAMILY MEDICINE

## 2021-08-12 RX ORDER — HYDROCODONE BITARTRATE AND ACETAMINOPHEN 5; 325 MG/1; MG/1
1 TABLET ORAL
Qty: 45 TABLET | Refills: 0 | Status: SHIPPED | OUTPATIENT
Start: 2021-10-12 | End: 2021-11-11 | Stop reason: SDUPTHER

## 2021-08-12 RX ORDER — HYDROCODONE BITARTRATE AND ACETAMINOPHEN 5; 325 MG/1; MG/1
1 TABLET ORAL
Qty: 45 TABLET | Refills: 0 | Status: SHIPPED | OUTPATIENT
Start: 2021-08-12 | End: 2021-08-12 | Stop reason: SDUPTHER

## 2021-08-12 RX ORDER — HYDROCODONE BITARTRATE AND ACETAMINOPHEN 5; 325 MG/1; MG/1
1 TABLET ORAL
Qty: 45 TABLET | Refills: 0 | Status: SHIPPED | OUTPATIENT
Start: 2021-09-12 | End: 2021-08-12 | Stop reason: SDUPTHER

## 2021-08-12 NOTE — PATIENT INSTRUCTIONS

## 2021-08-12 NOTE — PROGRESS NOTES
Elio Lewis  [de-identified] y.o. female  1940  NGI:[id number]  [facility] MEDICINE  Progress Note     Encounter Date: 8/12/2021    Assessment and Plan:     Encounter Diagnoses     ICD-10-CM ICD-9-CM   1. Medicare annual wellness visit, subsequent  Z00.00 V70.0   2. 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 Medicare annual wellness visit, subsequent  Updated  Declines COVID vaccine  Just has shingles  - diph,pertuss,acel,,tetanus vac,PF, (ADACEL) 2 Lf-(2.5-5-3-5 mcg)-5Lf/0.5 mL syrg vaccine; 0.5 mL by IntraMUSCular route once for 1 dose. Dispense: 0.5 mL; Refill: 0    2. Facial neuralgia  Refilled for 3 months with separate rx for each month August, Sept, Oct.   is fine without signs of misuse or diversion.  - HYDROcodone-acetaminophen (NORCO) 5-325 mg per tablet; Take 1 Tablet by mouth every eight (8) hours as needed for Pain for up to 30 days. Max Daily Amount: 3 Tablets. Dispense: 45 Tablet; Refill: 0      I have discussed the diagnosis with the patient and the intended plan as seen in the above orders. she has expressed understanding. The patient has received an after-visit summary and questions were answered concerning future plans. I have discussed medication side effects and warnings with the patient as well. Electronically Signed: Griselda Smyth MD    Current Medications after this visit     Current Outpatient Medications   Medication Sig    diph,pertuss,acel,,tetanus vac,PF, (ADACEL) 2 Lf-(2.5-5-3-5 mcg)-5Lf/0.5 mL syrg vaccine 0.5 mL by IntraMUSCular route once for 1 dose.  [START ON 10/12/2021] HYDROcodone-acetaminophen (NORCO) 5-325 mg per tablet Take 1 Tablet by mouth every eight (8) hours as needed for Pain for up to 30 days. Max Daily Amount: 3 Tablets.  gabapentin (NEURONTIN) 100 mg capsule One by mouth in AM and two by mouth at bedtime.     lisinopriL (PRINIVIL, ZESTRIL) 10 mg tablet TAKE 1 TABLET TWICE DAILY    pramipexole (MIRAPEX) 0.125 mg tablet TAKE 2 TABLETS EVERY NIGHT    meloxicam (MOBIC) 15 mg tablet TAKE 1 TABLET EVERY DAY    traZODone (DESYREL) 50 mg tablet TAKE 1 TABLET NIGHTLY    amLODIPine (NORVASC) 2.5 mg tablet TAKE 1 TABLET EVERY DAY    fluticasone propionate (FLONASE) 50 mcg/actuation nasal spray USE 1 SPRAY IN EACH NOSTRIL EVERY DAY    omeprazole (PRILOSEC) 20 mg capsule TAKE 1 CAPSULE EVERY DAY    Omeprazole delayed release (PRILOSEC D/R) 20 mg tablet Take 1 Tab by mouth daily.  diphenoxylate-atropine (LomotiL) 2.5-0.025 mg per tablet Take 1 Tab by mouth four (4) times daily as needed for Diarrhea. Max Daily Amount: 4 Tabs.  benzocaine 20 % liqd Instill 4 to 5 drops of otic solution into external ear canal of affected ear(s), repeat every 1 to 2 hours if necessary     No current facility-administered medications for this visit. Medications Discontinued During This Encounter   Medication Reason    HYDROcodone-acetaminophen (NORCO) 5-325 mg per tablet REORDER    HYDROcodone-acetaminophen (NORCO) 5-325 mg per tablet REORDER    HYDROcodone-acetaminophen (NORCO) 5-325 mg per tablet REORDER    HYDROcodone-acetaminophen (NORCO) 5-325 mg per tablet REORDER     ~~~~~~~~~~~~~~~~~~~~~~~~~~~~~~~~~~~~~~~~~~~~~~~~~~~~~~~~~~~    Chief Complaint   Patient presents with    Follow-up      3 month f/u Medication       History provided by patient  History of Present Illness   Mercedes Castillo is a [de-identified] y.o. female who presents to clinic today for:  Follow-up ( 3 month f/u Medication)    Recent episode of shingles.  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 FU of chronic pain  Stable on 1.5 hydrocodone per day + gabapentin  Needs refill  Recent episode of shingles. Pain control adequate.         Health Maintenance  Completed HM gaps at today's visit  Health Maintenance Due   Topic Date Due    COVID-19 Vaccine (1) Never done    DTaP/Tdap/Td series (1 - Tdap) Never done    Shingrix Vaccine Age 50> (1 of 2) Never done     Review of Systems   Review of Systems   Constitutional: Negative for chills, fever and weight loss. Respiratory: Negative for shortness of breath. Cardiovascular: Negative for chest pain and palpitations. Psychiatric/Behavioral: Negative. Vitals/Objective:     Vitals:    08/12/21 0835 08/12/21 0849   BP: (!) 166/74 (!) 160/68   Pulse: 87    Resp: 20    Temp: 97.6 °F (36.4 °C)    TempSrc: Temporal    SpO2: 95%    Weight: 115 lb 3.2 oz (52.3 kg)    Height: 5' 3\" (1.6 m)      Body mass index is 20.41 kg/m². Wt Readings from Last 3 Encounters:   08/12/21 115 lb 3.2 oz (52.3 kg)   05/12/21 112 lb 9.6 oz (51.1 kg)   04/28/21 112 lb 9.6 oz (51.1 kg)         Objective  Physical Exam  Vitals and nursing note reviewed. Constitutional:       Appearance: Normal appearance. She is not toxic-appearing. HENT:      Head: Normocephalic and atraumatic. Cardiovascular:      Rate and Rhythm: Normal rate and regular rhythm. Heart sounds: Normal heart sounds. No murmur heard. No gallop. Pulmonary:      Effort: Pulmonary effort is normal. No respiratory distress. Breath sounds: Normal breath sounds. No wheezing, rhonchi or rales. Musculoskeletal:      Cervical back: No muscular tenderness. Lymphadenopathy:      Cervical: No cervical adenopathy. Neurological:      Mental Status: She is alert. Psychiatric:         Mood and Affect: Mood normal.         Behavior: Behavior normal.         Thought Content: Thought content normal.         Judgment: Judgment normal.           No results found for this or any previous visit (from the past 24 hour(s)). Disposition     No future appointments. History   Patient's past medical, surgical and family histories were reviewed and updated. 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     Fibromyalgia     Generalized osteoarthritis 7/18/2018    GERD (gastroesophageal reflux disease)     Hyperlipidemia 7/18/2018    Hypertension     IBS (irritable bowel syndrome)     Lower back pain 7/18/2018    Osteoporosis 7/18/2018    Restless legs      Past Surgical History:   Procedure Laterality Date    HX COLONOSCOPY  01/2010    HX HYSTERECTOMY      HX OTHER SURGICAL      lumbar disc surgery    HX SALPINGO-OOPHORECTOMY       Family History   Problem Relation Age of Onset    Stroke Mother     Heart Attack Father     Hypertension Brother     Inflammatory Bowel Dz Brother      Social History     Tobacco Use    Smoking status: Never Smoker    Smokeless tobacco: Never Used   Substance Use Topics    Alcohol use: No    Drug use: No       Allergies     Allergies   Allergen Reactions    Alendronate Unknown (comments)    Doxycycline Unknown (comments)    Erythromycin Unknown (comments)    Nsaids (Non-Steroidal Anti-Inflammatory Drug) Unknown (comments)    Pcn [Penicillins] Unknown (comments)                     This is the Subsequent Medicare Annual Wellness Exam, performed 12 months or more after the Initial AWV or the last Subsequent AWV    I have reviewed the patient's medical history in detail and updated the computerized patient record. Assessment/Plan   Education and counseling provided:  Are appropriate based on today's review and evaluation  End-of-Life planning (with patient's consent)    1. Medicare annual wellness visit, subsequent  -     diph,pertuss,acel,,tetanus vac,PF, (ADACEL) 2 Lf-(2.5-5-3-5 mcg)-5Lf/0.5 mL syrg vaccine; 0.5 mL by IntraMUSCular route once for 1 dose., Print, Disp-0.5 mL, R-0  2. Facial neuralgia  -     HYDROcodone-acetaminophen (NORCO) 5-325 mg per tablet; Take 1 Tablet by mouth every eight (8) hours as needed for Pain for up to 30 days.  Max Daily Amount: 3 Tablets., Normal, Disp-45 Tablet, R-0       Depression Risk Factor Screening:     3 most recent PHQ Screens 8/12/2021   Little interest or pleasure in doing things Not at all   Feeling down, depressed, irritable, or hopeless Not at all   Total Score PHQ 2 0       Alcohol Risk Screen    Do you average more than 1 drink per night or more than 7 drinks a week:  No    On any one occasion in the past three months have you have had more than 3 drinks containing alcohol:  No    Non smoker    Functional Ability and Level of Safety:    Hearing: Hearing is good. Vision:  Up to date   Dental:  Dentures   Activities of Daily Living: The home contains: no safety equipment. Patient does total self care      Ambulation: with no difficulty     Fall Risk:  Fall Risk Assessment, last 12 mths 10/23/2019   Able to walk? Yes   Fall in past 12 months?  No      Abuse Screen:  Patient is not abused       Cognitive Screening    Has your family/caregiver stated any concerns about your memory: no    Cognitive Screening: Normal - interview    Health Maintenance Due     Health Maintenance Due   Topic Date Due    COVID-19 Vaccine (1) Never done    DTaP/Tdap/Td series (1 - Tdap) Never done    Shingrix Vaccine Age 50> (1 of 2) Never done       Patient Care Team   Patient Care Team:  Brandt Price MD as PCP - General (Family Medicine)  Brandt Price MD as PCP - [facility] Empaneled Provider  Ruben Harrington MD as Consulting Provider (Gastroenterology)  Mandy Doran MD as Consulting Provider (Neurology)  Samantha Nelson MD as Gynecologist (Obstetrics & Gynecology)  Dylon Frazier MD as Consulting Provider (Ophthalmology)  Lei Echeverria MD as Consulting Provider (Orthopedic Surgery)  Elier Sen MD as Consulting Provider (Rheumatology)    History     Patient Active Problem List   Diagnosis Code    Body mass index (BMI) 20.0-20.9, adult Z68.20    HTN (hypertension) I10    Facial pain R51.9    Generalized osteoarthritis M15.9    Hyperlipidemia E78.5    Lower back pain M54.5    Osteoporosis M81.0    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  Fibromyalgia     Generalized osteoarthritis 7/18/2018    GERD (gastroesophageal reflux disease)     Hyperlipidemia 7/18/2018    Hypertension     IBS (irritable bowel syndrome)     Lower back pain 7/18/2018    Osteoporosis 7/18/2018    Restless legs       Past Surgical History:   Procedure Laterality Date    HX COLONOSCOPY  01/2010    HX HYSTERECTOMY      HX OTHER SURGICAL      lumbar disc surgery    HX SALPINGO-OOPHORECTOMY       Current Outpatient Medications   Medication Sig Dispense Refill    diph,pertuss,acel,,tetanus vac,PF, (ADACEL) 2 Lf-(2.5-5-3-5 mcg)-5Lf/0.5 mL syrg vaccine 0.5 mL by IntraMUSCular route once for 1 dose. 0.5 mL 0    [START ON 10/12/2021] HYDROcodone-acetaminophen (NORCO) 5-325 mg per tablet Take 1 Tablet by mouth every eight (8) hours as needed for Pain for up to 30 days. Max Daily Amount: 3 Tablets. 45 Tablet 0    gabapentin (NEURONTIN) 100 mg capsule One by mouth in AM and two by mouth at bedtime. 270 Capsule 1    lisinopriL (PRINIVIL, ZESTRIL) 10 mg tablet TAKE 1 TABLET TWICE DAILY 180 Tablet 1    pramipexole (MIRAPEX) 0.125 mg tablet TAKE 2 TABLETS EVERY NIGHT 180 Tab 1    meloxicam (MOBIC) 15 mg tablet TAKE 1 TABLET EVERY DAY 90 Tab 1    traZODone (DESYREL) 50 mg tablet TAKE 1 TABLET NIGHTLY 90 Tab 1    amLODIPine (NORVASC) 2.5 mg tablet TAKE 1 TABLET EVERY DAY 90 Tab 1    fluticasone propionate (FLONASE) 50 mcg/actuation nasal spray USE 1 SPRAY IN EACH NOSTRIL EVERY DAY 32 g 1    omeprazole (PRILOSEC) 20 mg capsule TAKE 1 CAPSULE EVERY DAY 90 Cap 1    Omeprazole delayed release (PRILOSEC D/R) 20 mg tablet Take 1 Tab by mouth daily.  90 Tab 1    diphenoxylate-atropine (LomotiL) 2.5-0.025 mg per tablet Take 1 Tab by mouth four (4) times daily as needed for Diarrhea. Max Daily Amount: 4 Tabs.  30 Tab 1    benzocaine 20 % liqd Instill 4 to 5 drops of otic solution into external ear canal of affected ear(s), repeat every 1 to 2 hours if necessary 9 mL 0     Allergies   Allergen Reactions    Alendronate Unknown (comments)    Doxycycline Unknown (comments)    Erythromycin Unknown (comments)    Nsaids (Non-Steroidal Anti-Inflammatory Drug) Unknown (comments)    Pcn [Penicillins] Unknown (comments)       Family History   Problem Relation Age of Onset    Stroke Mother     Heart Attack Father     Hypertension Brother     Inflammatory Bowel Dz Brother      Social History     Tobacco Use    Smoking status: Never Smoker    Smokeless tobacco: Never Used   Substance Use Topics    Alcohol use: No         Jing Fermin MD

## 2021-08-12 NOTE — PROGRESS NOTES
Lolis Lai is a [de-identified] y.o. female      Chief Complaint   Patient presents with    Follow-up      3 month f/u Medication         1. Have you been to the ER, urgent care clinic since your last visit? Yes  Shingles  6/18/21   Hospitalized since your last visit? No      2. Have you seen or consulted any other health care providers outside of the [address] [place] [address] since your last visit? Include any pap smears or colon screening.     No

## 2021-09-30 ENCOUNTER — VIRTUAL VISIT (OUTPATIENT)
Dept: FAMILY MEDICINE CLINIC | Age: 81
End: 2021-09-30
Payer: MEDICARE

## 2021-09-30 DIAGNOSIS — N39.0 URINARY TRACT INFECTION WITHOUT HEMATURIA, SITE UNSPECIFIED: Primary | ICD-10-CM

## 2021-09-30 PROCEDURE — G8432 DEP SCR NOT DOC, RNG: HCPCS | Performed by: FAMILY MEDICINE

## 2021-09-30 PROCEDURE — 1090F PRES/ABSN URINE INCON ASSESS: CPT | Performed by: FAMILY MEDICINE

## 2021-09-30 PROCEDURE — 1101F PT FALLS ASSESS-DOCD LE1/YR: CPT | Performed by: FAMILY MEDICINE

## 2021-09-30 PROCEDURE — G8756 NO BP MEASURE DOC: HCPCS | Performed by: FAMILY MEDICINE

## 2021-09-30 PROCEDURE — G8427 DOCREV CUR MEDS BY ELIG CLIN: HCPCS | Performed by: FAMILY MEDICINE

## 2021-09-30 PROCEDURE — 99213 OFFICE O/P EST LOW 20 MIN: CPT | Performed by: FAMILY MEDICINE

## 2021-09-30 RX ORDER — CIPROFLOXACIN 500 MG/1
500 TABLET ORAL 2 TIMES DAILY
Qty: 20 TABLET | Refills: 0 | Status: SHIPPED | OUTPATIENT
Start: 2021-09-30 | End: 2021-10-10

## 2021-09-30 NOTE — PROGRESS NOTES
Consent: Igor Rust, who was seen by synchronous (real-time) audio-video technology, and/or her healthcare decision maker, is aware that this patient-initiated, Telehealth encounter on 9/30/2021 is a billable service, with coverage as determined by her insurance carrier. She is aware that she may receive a bill and has provided verbal consent to proceed: Yes. Assessment & Plan:   Diagnoses and all orders for this visit:    1. Urinary tract infection without hematuria, site unspecified  -     ciprofloxacin HCl (CIPRO) 500 mg tablet; Take 1 Tablet by mouth two (2) times a day for 10 days. -     CULTURE, URINE; Future          Follow-up and Dispositions    · Return if symptoms worsen or fail to improve. I ADVISED PATIENT TO GO TO ER IF SYMPTOMS WORSEN , CHANGE OR FAILS TO IMPROVE. I spent at least 15 minutes with this established patient, and >50% of the time was spent counseling and/or coordinating care regarding SEE BELOW  712  Subjective:   Igor Rust is a [de-identified] y.o. 1940 female  established patient, who was seen for UTI          1. Urinary tract infection without hematuria, site unspecified    Patient complains of dysuria. Onset was 1 week ago, gradually worsening since that time. Patient denies back pain. Patient does have a history of recurrent UTI. Patient does not have a history of pyelonephritis. Prior to Admission medications    Medication Sig Start Date End Date Taking? Authorizing Provider   ciprofloxacin HCl (CIPRO) 500 mg tablet Take 1 Tablet by mouth two (2) times a day for 10 days. 9/30/21 10/10/21 Yes Trini Weber MD   gabapentin (NEURONTIN) 100 mg capsule One by mouth in AM and two by mouth at bedtime. 7/8/21   Guzman Sam MD   HYDROcodone-acetaminophen [facility]) 5-325 mg per tablet Take 1 Tablet by mouth every eight (8) hours as needed for Pain for up to 30 days. Max Daily Amount: 3 Tablets.  10/12/21 11/11/21  Guzman Sam MD   lisinopriL (PRINIVIL, ZESTRIL) 10 mg tablet TAKE 1 TABLET TWICE DAILY 7/1/21   Osseo Foot, MD   pramipexole (MIRAPEX) 0.125 mg tablet TAKE 2 TABLETS EVERY NIGHT 5/17/21   Osseo Foot, MD   meloxicam (MOBIC) 15 mg tablet TAKE 1 TABLET EVERY DAY 5/13/21   Jamison Foot, MD   traZODone (DESYREL) 50 mg tablet TAKE 1 TABLET NIGHTLY 4/12/21   Osseo Foot, MD   amLODIPine (NORVASC) 2.5 mg tablet TAKE 1 TABLET EVERY DAY 2/22/21   Osseo Foot, MD   fluticasone propionate (FLONASE) 50 mcg/actuation nasal spray USE 1 SPRAY IN EACH NOSTRIL EVERY DAY 1/26/21   Osseo Foot, MD   omeprazole (PRILOSEC) 20 mg capsule TAKE 1 CAPSULE EVERY DAY 10/14/20   Jamison Foot, MD   Omeprazole delayed release (PRILOSEC D/R) 20 mg tablet Take 1 Tab by mouth daily. 4/20/20   Jamison Foot, MD   diphenoxylate-atropine (LomotiL) 2.5-0.025 mg per tablet Take 1 Tab by mouth four (4) times daily as needed for Diarrhea. Max Daily Amount: 4 Tabs.  4/20/20   Jamison Garcia, MD   benzocaine 20 % liqd Instill 4 to 5 drops of otic solution into external ear canal of affected ear(s), repeat every 1 to 2 hours if necessary 2/20/19   Homar Spain MD     Allergies   Allergen Reactions    Alendronate Unknown (comments)    Doxycycline Unknown (comments)    Erythromycin Unknown (comments)    Nsaids (Non-Steroidal Anti-Inflammatory Drug) Unknown (comments)    Pcn [Penicillins] Unknown (comments)       Patient Active Problem List   Diagnosis Code    Body mass index (BMI) 20.0-20.9, adult Z68.20    HTN (hypertension) I10    Facial pain R51.9    Generalized osteoarthritis M15.9    Hyperlipidemia E78.5    Lower back pain M54.5    Osteoporosis M81.0    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    Opioid use, unspecified with unspecified opioid-induced disorder F11.99     Patient Active Problem List    Diagnosis Date Noted    Opioid use, unspecified with unspecified opioid-induced disorder 08/12/2021    Infraorbital neuralgia 01/23/2019    Body mass index (BMI) 20.0-20.9, adult 07/18/2018    HTN (hypertension) 07/18/2018    Facial pain 07/18/2018    Generalized osteoarthritis 07/18/2018    Hyperlipidemia 07/18/2018    Lower back pain 07/18/2018    Osteoporosis 07/18/2018     Current Outpatient Medications   Medication Sig Dispense Refill    ciprofloxacin HCl (CIPRO) 500 mg tablet Take 1 Tablet by mouth two (2) times a day for 10 days. 20 Tablet 0    gabapentin (NEURONTIN) 100 mg capsule One by mouth in AM and two by mouth at bedtime. 270 Capsule 1    [START ON 10/12/2021] HYDROcodone-acetaminophen (NORCO) 5-325 mg per tablet Take 1 Tablet by mouth every eight (8) hours as needed for Pain for up to 30 days. Max Daily Amount: 3 Tablets. 45 Tablet 0    lisinopriL (PRINIVIL, ZESTRIL) 10 mg tablet TAKE 1 TABLET TWICE DAILY 180 Tablet 1    pramipexole (MIRAPEX) 0.125 mg tablet TAKE 2 TABLETS EVERY NIGHT 180 Tab 1    meloxicam (MOBIC) 15 mg tablet TAKE 1 TABLET EVERY DAY 90 Tab 1    traZODone (DESYREL) 50 mg tablet TAKE 1 TABLET NIGHTLY 90 Tab 1    amLODIPine (NORVASC) 2.5 mg tablet TAKE 1 TABLET EVERY DAY 90 Tab 1    fluticasone propionate (FLONASE) 50 mcg/actuation nasal spray USE 1 SPRAY IN EACH NOSTRIL EVERY DAY 32 g 1    omeprazole (PRILOSEC) 20 mg capsule TAKE 1 CAPSULE EVERY DAY 90 Cap 1    Omeprazole delayed release (PRILOSEC D/R) 20 mg tablet Take 1 Tab by mouth daily. 90 Tab 1    diphenoxylate-atropine (LomotiL) 2.5-0.025 mg per tablet Take 1 Tab by mouth four (4) times daily as needed for Diarrhea. Max Daily Amount: 4 Tabs.  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  Fibromyalgia     Generalized osteoarthritis 7/18/2018    GERD (gastroesophageal reflux disease)     Hyperlipidemia 7/18/2018    Hypertension     IBS (irritable bowel syndrome)     Lower back pain 7/18/2018    Osteoporosis 7/18/2018    Restless legs      Past Surgical History:   Procedure Laterality Date    HX COLONOSCOPY  01/2010    HX HYSTERECTOMY      HX OTHER SURGICAL      lumbar disc surgery    HX SALPINGO-OOPHORECTOMY       Family History   Problem Relation Age of Onset    Stroke Mother     Heart Attack Father     Hypertension Brother     Inflammatory Bowel Dz Brother      Social History     Tobacco Use    Smoking status: Never Smoker    Smokeless tobacco: Never Used   Substance Use Topics    Alcohol use: No       Review of Systems   Constitutional: Negative. HENT: Negative. Eyes: Negative. Respiratory: Negative. Cardiovascular: Negative. Gastrointestinal: Negative. Genitourinary: Positive for dysuria and frequency. Musculoskeletal: Negative. Skin: Negative. Neurological: Negative. Endo/Heme/Allergies: Negative. Psychiatric/Behavioral: Negative.             Objective:     Patient-Reported Vitals 1/21/2021   Patient-Reported Weight 112lb   Patient-Reported Height -   Patient-Reported Pulse -   Patient-Reported Systolic  -   Patient-Reported Diastolic -        [INSTRUCTIONS:  \"[x]\" Indicates a positive item  \"[]\" Indicates a negative item  -- DELETE ALL ITEMS NOT EXAMINED]    Constitutional: [x] Appears well-developed and well-nourished [x] No apparent distress      [] Abnormal -     Mental status: [x] Alert and awake  [x] Oriented to person/place/time [x] Able to follow commands    [] Abnormal -     Eyes:   EOM    [x]  Normal    [] Abnormal - Sclera  [x]  Normal    [] Abnormal -          Discharge [x]  None visible   [] Abnormal -     HENT: [x] Normocephalic, atraumatic  [] Abnormal -   [x] Mouth/Throat: Mucous membranes are moist    External Ears [x] Normal  [] Abnormal -    Neck: [x] No visualized mass [] Abnormal -     Pulmonary/Chest: [x] Respiratory effort normal   [x] No visualized signs of difficulty breathing or respiratory distress        [] Abnormal -      Musculoskeletal:   [x] Normal gait with no signs of ataxia         [x] Normal range of motion of neck        [] Abnormal -     Neurological:        [x] No Facial Asymmetry (Cranial nerve 7 motor function) (limited exam due to video visit)          [x] No gaze palsy        [] Abnormal -          Skin:        [x] No significant exanthematous lesions or discoloration noted on facial skin         [] Abnormal -            Psychiatric:       [x] Normal Affect [] Abnormal -        [x] No Hallucinations    Other pertinent observable physical exam findings:-    We discussed the expected course, resolution and complications of the diagnosis(es) in detail. Medication risks, benefits, costs, interactions, and alternatives were discussed as indicated. I advised her to contact the office if her condition worsens, changes or fails to improve as anticipated. She expressed understanding with the diagnosis(es) and plan. Albino Olvera is a [de-identified] y.o. female  is being evaluated by a Virtual Visit (video visit) encounter to address concerns as mentioned above. A caregiver was present when appropriate. Due to this being a TeleHealth encounter (During EOP-02 public health emergency), evaluation of the following organ systems was limited: Vitals/Constitutional/EENT/Resp/CV/GI//MS/Neuro/Skin/Heme-Lymph-Imm.   Pursuant to the emergency declaration under the [facility], [address] [place] [address] authority and the [employer], this Virtual Visit was conducted with patient's (and/or legal guardian's) consent, to reduce the patient's risk of exposure to COVID-19 and provide necessary medical care. The patient (and/or legal guardian) has also been advised to contact this office for worsening conditions or problems, and seek emergency medical treatment and/or call 911 if deemed necessary. Patient identification was verified at the start of the visit: YES    Services were provided through a video synchronous discussion virtually to substitute for in-person clinic visit. Patient and provider were located at their individual homes. An electronic signature was used to authenticate this note.       Valerie Fernandes MD

## 2021-10-01 LAB
BACTERIA SPEC CULT: NORMAL
SERVICE CMNT-IMP: NORMAL

## 2021-10-11 DIAGNOSIS — G25.81 RESTLESS LEGS SYNDROME: ICD-10-CM

## 2021-10-11 RX ORDER — PRAMIPEXOLE DIHYDROCHLORIDE 0.12 MG/1
TABLET ORAL
Qty: 180 TABLET | Refills: 1 | Status: SHIPPED | OUTPATIENT
Start: 2021-10-11 | End: 2022-03-09 | Stop reason: SDUPTHER

## 2021-11-01 DIAGNOSIS — G51.8 FACIAL NEURALGIA: ICD-10-CM

## 2021-11-01 RX ORDER — HYDROCODONE BITARTRATE AND ACETAMINOPHEN 5; 325 MG/1; MG/1
1 TABLET ORAL
Qty: 45 TABLET | Refills: 0 | OUTPATIENT
Start: 2021-11-01 | End: 2021-12-01

## 2021-11-11 ENCOUNTER — OFFICE VISIT (OUTPATIENT)
Dept: FAMILY MEDICINE CLINIC | Age: 81
End: 2021-11-11
Payer: MEDICARE

## 2021-11-11 VITALS
DIASTOLIC BLOOD PRESSURE: 72 MMHG | OXYGEN SATURATION: 97 % | TEMPERATURE: 97.7 F | BODY MASS INDEX: 20.73 KG/M2 | WEIGHT: 117 LBS | RESPIRATION RATE: 16 BRPM | SYSTOLIC BLOOD PRESSURE: 204 MMHG | HEIGHT: 63 IN | HEART RATE: 77 BPM

## 2021-11-11 DIAGNOSIS — I10 ESSENTIAL HYPERTENSION: ICD-10-CM

## 2021-11-11 DIAGNOSIS — G51.8 FACIAL NEURALGIA: ICD-10-CM

## 2021-11-11 DIAGNOSIS — R30.0 BURNING WITH URINATION: Primary | ICD-10-CM

## 2021-11-11 DIAGNOSIS — K59.1 FUNCTIONAL DIARRHEA: ICD-10-CM

## 2021-11-11 LAB
BILIRUB UR QL STRIP: NEGATIVE
GLUCOSE UR-MCNC: NEGATIVE MG/DL
KETONES P FAST UR STRIP-MCNC: NEGATIVE MG/DL
PH UR STRIP: 6 [PH] (ref 4.6–8)
PROT UR QL STRIP: NEGATIVE
SP GR UR STRIP: 1.02 (ref 1–1.03)
UA UROBILINOGEN AMB POC: ABNORMAL (ref 0.2–1)
URINALYSIS CLARITY POC: CLEAR
URINALYSIS COLOR POC: ABNORMAL
URINE BLOOD POC: ABNORMAL
URINE LEUKOCYTES POC: NEGATIVE
URINE NITRITES POC: NEGATIVE

## 2021-11-11 PROCEDURE — G8536 NO DOC ELDER MAL SCRN: HCPCS | Performed by: FAMILY MEDICINE

## 2021-11-11 PROCEDURE — G8510 SCR DEP NEG, NO PLAN REQD: HCPCS | Performed by: FAMILY MEDICINE

## 2021-11-11 PROCEDURE — G8427 DOCREV CUR MEDS BY ELIG CLIN: HCPCS | Performed by: FAMILY MEDICINE

## 2021-11-11 PROCEDURE — G8753 SYS BP > OR = 140: HCPCS | Performed by: FAMILY MEDICINE

## 2021-11-11 PROCEDURE — 99214 OFFICE O/P EST MOD 30 MIN: CPT | Performed by: FAMILY MEDICINE

## 2021-11-11 PROCEDURE — 1101F PT FALLS ASSESS-DOCD LE1/YR: CPT | Performed by: FAMILY MEDICINE

## 2021-11-11 PROCEDURE — 1090F PRES/ABSN URINE INCON ASSESS: CPT | Performed by: FAMILY MEDICINE

## 2021-11-11 PROCEDURE — 81002 URINALYSIS NONAUTO W/O SCOPE: CPT | Performed by: FAMILY MEDICINE

## 2021-11-11 PROCEDURE — G8754 DIAS BP LESS 90: HCPCS | Performed by: FAMILY MEDICINE

## 2021-11-11 PROCEDURE — G8420 CALC BMI NORM PARAMETERS: HCPCS | Performed by: FAMILY MEDICINE

## 2021-11-11 RX ORDER — DIPHENOXYLATE HYDROCHLORIDE AND ATROPINE SULFATE 2.5; .025 MG/1; MG/1
1 TABLET ORAL
Qty: 30 TABLET | Refills: 1 | Status: SHIPPED | OUTPATIENT
Start: 2021-11-11 | End: 2022-08-11

## 2021-11-11 RX ORDER — GABAPENTIN 100 MG/1
CAPSULE ORAL
Qty: 270 CAPSULE | Refills: 1 | Status: SHIPPED | OUTPATIENT
Start: 2021-11-11 | End: 2022-08-10 | Stop reason: SDUPTHER

## 2021-11-11 RX ORDER — HYDROCODONE BITARTRATE AND ACETAMINOPHEN 5; 325 MG/1; MG/1
1 TABLET ORAL
Qty: 45 TABLET | Refills: 0 | Status: SHIPPED | OUTPATIENT
Start: 2022-01-11 | End: 2022-01-27 | Stop reason: SDUPTHER

## 2021-11-11 RX ORDER — HYDROCODONE BITARTRATE AND ACETAMINOPHEN 5; 325 MG/1; MG/1
1 TABLET ORAL
Qty: 45 TABLET | Refills: 0 | Status: SHIPPED | OUTPATIENT
Start: 2021-11-11 | End: 2021-11-11 | Stop reason: SDUPTHER

## 2021-11-11 RX ORDER — AMLODIPINE BESYLATE 2.5 MG/1
TABLET ORAL
Qty: 90 TABLET | Refills: 1 | Status: SHIPPED | OUTPATIENT
Start: 2021-11-11 | End: 2022-08-10 | Stop reason: SDUPTHER

## 2021-11-11 RX ORDER — LISINOPRIL 10 MG/1
10 TABLET ORAL 2 TIMES DAILY
Qty: 180 TABLET | Refills: 1 | Status: SHIPPED | OUTPATIENT
Start: 2021-11-11 | End: 2022-08-10 | Stop reason: SDUPTHER

## 2021-11-11 RX ORDER — HYDROCODONE BITARTRATE AND ACETAMINOPHEN 5; 325 MG/1; MG/1
1 TABLET ORAL
Qty: 45 TABLET | Refills: 0 | Status: SHIPPED | OUTPATIENT
Start: 2021-12-11 | End: 2021-11-11 | Stop reason: SDUPTHER

## 2021-11-11 NOTE — PROGRESS NOTES
Kathryn Aquino  80 y.o. female  1940  [id number]  [facility]  Progress Note     Encounter Date: 11/11/2021    Assessment and Plan:     Encounter Diagnoses     ICD-10-CM ICD-9-CM   1. Burning with urination  R30.0 788.1   2. 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 Functional diarrhea  K59.1 564.5   4. Essential hypertension  I10 401.9       1. Burning with urination  FU urine after UTI is normal  - AMB POC URINE DIP STICK MANUAL W/O MICRO CHEMSTRIP-10    2. Facial neuralgia   Is OK  Refilled meds for chronic facial pain and entrapment of infraorbital nerve. - HYDROcodone-acetaminophen (NORCO) 5-325 mg per tablet; Take 1 Tablet by mouth every eight (8) hours as needed for Pain for up to 30 days. Max Daily Amount: 3 Tablets. Dispense: 45 Tablet; Refill: 0  - gabapentin (NEURONTIN) 100 mg capsule; One by mouth in AM and two by mouth at bedtime. Dispense: 270 Capsule; Refill: 1    3. Functional diarrhea  Refilled for PRN use for diarrhea. - diphenoxylate-atropine (LomotiL) 2.5-0.025 mg per tablet; Take 1 Tablet by mouth four (4) times daily as needed for Diarrhea. Max Daily Amount: 4 Tablets. Dispense: 30 Tablet; Refill: 1    4. Essential hypertension  Needs to resume amlodipine  Recheck BP 7-10 days with nurses, bring home cuff  - lisinopriL (PRINIVIL, ZESTRIL) 10 mg tablet; Take 1 Tablet by mouth two (2) times a day. Dispense: 180 Tablet; Refill: 1  - amLODIPine (NORVASC) 2.5 mg tablet; TAKE 1 TABLET EVERY DAY  Dispense: 90 Tablet; Refill: 1      I have discussed the diagnosis with the patient and the intended plan as seen in the above orders. she has expressed understanding. The patient has received an after-visit summary and questions were answered concerning future plans. I have discussed medication side effects and warnings with the patient as well.     Electronically Signed: Denia Jara MD    Current Medications after this visit     Current Outpatient Medications Medication Sig    [START ON 1/11/2022] HYDROcodone-acetaminophen (NORCO) 5-325 mg per tablet Take 1 Tablet by mouth every eight (8) hours as needed for Pain for up to 30 days. Max Daily Amount: 3 Tablets.  gabapentin (NEURONTIN) 100 mg capsule One by mouth in AM and two by mouth at bedtime.  diphenoxylate-atropine (LomotiL) 2.5-0.025 mg per tablet Take 1 Tablet by mouth four (4) times daily as needed for Diarrhea. Max Daily Amount: 4 Tablets.  lisinopriL (PRINIVIL, ZESTRIL) 10 mg tablet Take 1 Tablet by mouth two (2) times a day.  amLODIPine (NORVASC) 2.5 mg tablet TAKE 1 TABLET EVERY DAY    pramipexole (MIRAPEX) 0.125 mg tablet TAKE 2 TABLETS EVERY NIGHT    meloxicam (MOBIC) 15 mg tablet TAKE 1 TABLET EVERY DAY    traZODone (DESYREL) 50 mg tablet TAKE 1 TABLET NIGHTLY    fluticasone propionate (FLONASE) 50 mcg/actuation nasal spray USE 1 SPRAY IN EACH NOSTRIL EVERY DAY    omeprazole (PRILOSEC) 20 mg capsule TAKE 1 CAPSULE EVERY DAY    benzocaine 20 % liqd Instill 4 to 5 drops of otic solution into external ear canal of affected ear(s), repeat every 1 to 2 hours if necessary     No current facility-administered medications for this visit.      Medications Discontinued During This Encounter   Medication Reason    Omeprazole delayed release (PRILOSEC D/R) 20 mg tablet Not A Current Medication    HYDROcodone-acetaminophen (NORCO) 5-325 mg per tablet REORDER    HYDROcodone-acetaminophen (NORCO) 5-325 mg per tablet REORDER    HYDROcodone-acetaminophen (NORCO) 5-325 mg per tablet REORDER    diphenoxylate-atropine (LomotiL) 2.5-0.025 mg per tablet REORDER    gabapentin (NEURONTIN) 100 mg capsule REORDER    amLODIPine (NORVASC) 2.5 mg tablet REORDER    lisinopriL (PRINIVIL, ZESTRIL) 10 mg tablet REORDER     ~~~~~~~~~~~~~~~~~~~~~~~~~~~~~~~~~~~~~~~~~~~~~~~~~~~~~~~~~~~    Chief Complaint   Patient presents with    Medication Refill    Hypertension    Bladder Infection     This is a f/u visit History provided by patient  History of Present Illness   Neymar Sanchez is a 80 y.o. female who presents to clinic today for:  Medication Refill, Hypertension, and Bladder Infection (This is a f/u visit )    Hypertension  Stopped amlodipine on her own. Thought it was making her tired. High here  High at home with her cuff 856 systolic. BP cuff checked her in past and thought to be accurate. Chronic pain  About the same  Remains on 1.5 hydrocodone per day   is ok  Still on gabapentin  Needs refills. Health Maintenance  Will do at future visit  Health Maintenance Due   Topic Date Due    COVID-19 Vaccine (1) Never done    DTaP/Tdap/Td series (1 - Tdap) Never done    Shingrix Vaccine Age 50> (1 of 2) Never done    Flu Vaccine (1) Never done     Review of Systems   Review of Systems   Constitutional: Positive for malaise/fatigue. Negative for chills, fever and weight loss. Respiratory: Negative for shortness of breath. Cardiovascular: Negative for chest pain and leg swelling. Musculoskeletal: Positive for joint pain. Neurological: Negative for dizziness and headaches. Psychiatric/Behavioral: Negative. Vitals/Objective:     Vitals:    11/11/21 1157 11/11/21 1204 11/11/21 1333   BP: (!) 208/71 (!) 203/68 (!) 204/72   Pulse: 77     Resp: 16     Temp: 97.7 °F (36.5 °C)     TempSrc: Temporal     SpO2: 97%     Weight: 117 lb (53.1 kg)     Height: 5' 3\" (1.6 m)       Body mass index is 20.73 kg/m². Wt Readings from Last 3 Encounters:   11/11/21 117 lb (53.1 kg)   08/12/21 115 lb 3.2 oz (52.3 kg)   05/12/21 112 lb 9.6 oz (51.1 kg)         Objective  Physical Exam  Vitals and nursing note reviewed. Constitutional:       Appearance: Normal appearance. She is not toxic-appearing. HENT:      Head: Normocephalic and atraumatic. Cardiovascular:      Rate and Rhythm: Normal rate and regular rhythm. Heart sounds: Normal heart sounds. No murmur heard. No gallop.     Pulmonary: Effort: Pulmonary effort is normal. No respiratory distress. Breath sounds: Normal breath sounds. No wheezing, rhonchi or rales. Musculoskeletal:      Cervical back: No muscular tenderness. Lymphadenopathy:      Cervical: No cervical adenopathy. Neurological:      Mental Status: She is alert. Psychiatric:         Mood and Affect: Mood normal.         Behavior: Behavior normal.         Thought Content: Thought content normal.         Judgment: Judgment normal.           Recent Results (from the past 24 hour(s))   AMB POC URINE DIP STICK MANUAL W/O MICRO CHEMSTRIP-10    Collection Time: 11/11/21 12:05 PM   Result Value Ref Range    Color (UA POC) Light Yellow     Clarity (UA POC) Clear     Specific gravity (UA POC) 1.020 1.001 - 1.035    pH (UA POC) 6.0 4.6 - 8.0    Leukocyte esterase (UA POC) Negative Negative    Nitrites (UA POC) Negative Negative    Protein (UA POC) Negative Negative    Glucose (UA POC) Negative Negative mg/dL    Ketones (UA POC) Negative Negative    Urobilinogen (UA POC) 0.2 mg/dL 0.2 - 1    Bilirubin (UA POC) Negative Negative    Blood (UA POC) Small Negative      Disposition     Follow-up and Dispositions  ·   Return in about 3 months (around 2/11/2022) for Medication follow up. No future appointments. History   Patient's past medical, surgical and family histories were reviewed and updated. 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     Fibromyalgia     Generalized osteoarthritis 7/18/2018    GERD (gastroesophageal reflux disease)     Hyperlipidemia 7/18/2018    Hypertension     IBS (irritable bowel syndrome)     Lower back pain 7/18/2018    Osteoporosis 7/18/2018    Restless legs      Past Surgical History:   Procedure Laterality Date    HX COLONOSCOPY  01/2010    HX HYSTERECTOMY      HX OTHER SURGICAL      lumbar disc surgery    HX SALPINGO-OOPHORECTOMY       Family History   Problem Relation Age of Onset    Stroke Mother     Heart Attack Father     Hypertension Brother     Inflammatory Bowel Dz Brother      Social History     Tobacco Use    Smoking status: Never Smoker    Smokeless tobacco: Never Used   Substance Use Topics    Alcohol use: No    Drug use: No       Allergies     Allergies   Allergen Reactions    Alendronate Unknown (comments)    Doxycycline Unknown (comments)    Erythromycin Unknown (comments)    Nsaids (Non-Steroidal Anti-Inflammatory Drug) Unknown (comments)    Pcn [Penicillins] Unknown (comments)

## 2021-11-11 NOTE — PROGRESS NOTES
eJz Rob is a 80 y.o. female      Chief Complaint   Patient presents with    Medication Refill    Hypertension    Bladder Infection     This is a f/u visit          1. Have you been to the ER, urgent care clinic since your last visit? No  Hospitalized since your last visit? No       2. Have you seen or consulted any other health care providers outside of the [address] [place] [address] since your last visit? Include any pap smears or colon screening   .    No

## 2022-01-04 ENCOUNTER — TELEPHONE (OUTPATIENT)
Dept: FAMILY MEDICINE CLINIC | Age: 82
End: 2022-01-04

## 2022-01-04 NOTE — TELEPHONE ENCOUNTER
You can call her. If he has symptoms, we need to know and can consider treatment. The hospital and surgeon will need to decide when he has surgery. They will probably postpone the surgery until he is negative.   [facility]

## 2022-01-04 NOTE — TELEPHONE ENCOUNTER
supposed to have surgery tomorrow but was tested Covid Positve.  Need to talk to Nurse or   [phone]

## 2022-01-05 NOTE — TELEPHONE ENCOUNTER
TC- to Mrs. Laughlin Name and  verified      They have spoke to the surgeon and they have put the surgery off for a month. The only symptoms so far are running nose and small cough.

## 2022-01-10 ENCOUNTER — TELEPHONE (OUTPATIENT)
Dept: FAMILY MEDICINE CLINIC | Age: 82
End: 2022-01-10

## 2022-01-10 ENCOUNTER — VIRTUAL VISIT (OUTPATIENT)
Dept: FAMILY MEDICINE CLINIC | Age: 82
End: 2022-01-10
Payer: MEDICARE

## 2022-01-10 DIAGNOSIS — U07.1 COVID-19: Primary | ICD-10-CM

## 2022-01-10 PROCEDURE — 99443 PR PHYS/QHP TELEPHONE EVALUATION 21-30 MIN: CPT | Performed by: FAMILY MEDICINE

## 2022-01-10 RX ORDER — AZITHROMYCIN 250 MG/1
TABLET, FILM COATED ORAL
Qty: 6 TABLET | Refills: 0 | Status: SHIPPED | OUTPATIENT
Start: 2022-01-10 | End: 2022-01-15

## 2022-01-10 NOTE — PROGRESS NOTES
Lamin Mccoy is a 80 y.o. female      Chief Complaint   Patient presents with    Post-COVID Symptoms     symptoms started last Tuesday          1. Have you been to the ER, urgent care clinic since your last visit? No  Hospitalized since your last visit? No       2. Have you seen or consulted any other health care providers outside of the [address] [place] [address] since your last visit? Include any pap smears or colon screening.    No

## 2022-01-10 NOTE — TELEPHONE ENCOUNTER
I left a message for the patient to return my call.     LM- if is having  Chest pain  needs to go to ER to be evaluated

## 2022-01-10 NOTE — PROGRESS NOTES
Alannah Mas is a 80 y.o. female evaluated via telephone on 1/10/2022. Consent:  She and/or health care decision maker is aware that she may receive a bill for this telephone service, depending on her insurance coverage, and has provided verbal consent to proceed: Yes      Documentation:  I communicated with the patient and/or health care decision maker about COVID. Details of this discussion including any medical advice provided:       and son have COVID   has pretty typical sx.  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 Patient has not had test  Assessment and Plan:    1. COVID-19  High risk patient  Household contact even thought she has not had test.  - ivermectin 14 mg daily for 5 days- called in to [facility]  To [facility]:  - azithromycin (ZITHROMAX) 250 mg tablet; Take 2 tablets today, then take 1 tablet daily  Dispense: 6 Tablet; Refill: 0  -ASA, Vitamin C, zinc, Vitamin D3  FU if worsening sx of dyspnea, fever. I affirm this is a Patient Initiated Episode with a Patient who has not had a related appointment within my department in the past 7 days or scheduled within the next 24 hours. Total Time: minutes: 11-20 minutes    Note: not billable if this call serves to triage the patient into an appointment for the relevant concern    The patient was at home and I was in office for this phone visit.     Chang Bruno MD

## 2022-01-17 ENCOUNTER — TELEPHONE (OUTPATIENT)
Dept: FAMILY MEDICINE CLINIC | Age: 82
End: 2022-01-17

## 2022-01-17 DIAGNOSIS — K12.1 STOMATITIS: Primary | ICD-10-CM

## 2022-01-17 RX ORDER — NYSTATIN 100000 [USP'U]/ML
1 SUSPENSION ORAL 4 TIMES DAILY
Qty: 60 ML | Refills: 0 | Status: SHIPPED | OUTPATIENT
Start: 2022-01-17

## 2022-01-17 NOTE — TELEPHONE ENCOUNTER
----- Message from [place] sent at 1/17/2022  1:23 PM EST -----  Subject: Message to Provider    QUESTIONS  Information for Provider? Patient has called in due to antibiotics she is   taken has gave her yeast in the mouth and she request some medications   that are not listed in file. Patient requesting a call back. ---------------------------------------------------------------------------  --------------  Irma MONZON  What is the best way for the office to contact you? OK to leave message on   voicemail  Preferred Call Back Phone Number? [phone]  ---------------------------------------------------------------------------  --------------  SCRIPT ANSWERS  Relationship to Patient?  Self

## 2022-01-17 NOTE — TELEPHONE ENCOUNTER
Coated burning tongue. Thinks she has had similar sx in past.  Better with Klacks solution. No longer febrile. Breathing and cough are better. Recent death of sister. 01/17/22  6:05 PM    1. Stomatitis    - nystatin (MYCOSTATIN) 100,000 unit/mL suspension; Take 5 mL by mouth four (4) times daily.  swish and spit  Dispense: 60 mL; Refill: 0      Wanda Oro MD

## 2022-01-27 ENCOUNTER — OFFICE VISIT (OUTPATIENT)
Dept: FAMILY MEDICINE CLINIC | Age: 82
End: 2022-01-27
Payer: MEDICARE

## 2022-01-27 VITALS
TEMPERATURE: 98.1 F | RESPIRATION RATE: 18 BRPM | DIASTOLIC BLOOD PRESSURE: 80 MMHG | SYSTOLIC BLOOD PRESSURE: 167 MMHG | OXYGEN SATURATION: 97 % | BODY MASS INDEX: 19.84 KG/M2 | HEIGHT: 63 IN | HEART RATE: 86 BPM | WEIGHT: 112 LBS

## 2022-01-27 DIAGNOSIS — G51.8 FACIAL NEURALGIA: ICD-10-CM

## 2022-01-27 DIAGNOSIS — R00.2 PALPITATIONS: ICD-10-CM

## 2022-01-27 DIAGNOSIS — K12.1 STOMATITIS: ICD-10-CM

## 2022-01-27 DIAGNOSIS — I10 ESSENTIAL HYPERTENSION: ICD-10-CM

## 2022-01-27 DIAGNOSIS — U07.1 COVID-19: Primary | ICD-10-CM

## 2022-01-27 PROBLEM — F11.99 OPIOID USE, UNSPECIFIED WITH UNSPECIFIED OPIOID-INDUCED DISORDER (HCC): Status: RESOLVED | Noted: 2021-08-12 | Resolved: 2022-01-27

## 2022-01-27 PROCEDURE — 1090F PRES/ABSN URINE INCON ASSESS: CPT | Performed by: FAMILY MEDICINE

## 2022-01-27 PROCEDURE — 99214 OFFICE O/P EST MOD 30 MIN: CPT | Performed by: FAMILY MEDICINE

## 2022-01-27 PROCEDURE — G8753 SYS BP > OR = 140: HCPCS | Performed by: FAMILY MEDICINE

## 2022-01-27 PROCEDURE — G8536 NO DOC ELDER MAL SCRN: HCPCS | Performed by: FAMILY MEDICINE

## 2022-01-27 PROCEDURE — 93000 ELECTROCARDIOGRAM COMPLETE: CPT | Performed by: FAMILY MEDICINE

## 2022-01-27 PROCEDURE — G8432 DEP SCR NOT DOC, RNG: HCPCS | Performed by: FAMILY MEDICINE

## 2022-01-27 PROCEDURE — G8754 DIAS BP LESS 90: HCPCS | Performed by: FAMILY MEDICINE

## 2022-01-27 PROCEDURE — G8420 CALC BMI NORM PARAMETERS: HCPCS | Performed by: FAMILY MEDICINE

## 2022-01-27 PROCEDURE — G8427 DOCREV CUR MEDS BY ELIG CLIN: HCPCS | Performed by: FAMILY MEDICINE

## 2022-01-27 PROCEDURE — 1101F PT FALLS ASSESS-DOCD LE1/YR: CPT | Performed by: FAMILY MEDICINE

## 2022-01-27 RX ORDER — HYDROCODONE BITARTRATE AND ACETAMINOPHEN 5; 325 MG/1; MG/1
1 TABLET ORAL
Qty: 45 TABLET | Refills: 0 | Status: SHIPPED | OUTPATIENT
Start: 2022-04-11 | End: 2022-05-09 | Stop reason: SDUPTHER

## 2022-01-27 RX ORDER — FLUTICASONE PROPIONATE 50 MCG
SPRAY, SUSPENSION (ML) NASAL
Qty: 32 G | Refills: 1 | Status: SHIPPED | OUTPATIENT
Start: 2022-01-27

## 2022-01-27 RX ORDER — HYDROCODONE BITARTRATE AND ACETAMINOPHEN 5; 325 MG/1; MG/1
1 TABLET ORAL
Qty: 45 TABLET | Refills: 0 | Status: SHIPPED | OUTPATIENT
Start: 2022-02-11 | End: 2022-01-27 | Stop reason: SDUPTHER

## 2022-01-27 RX ORDER — OMEPRAZOLE 20 MG/1
CAPSULE, DELAYED RELEASE ORAL
Qty: 90 CAPSULE | Refills: 1 | Status: SHIPPED | OUTPATIENT
Start: 2022-01-27

## 2022-01-27 RX ORDER — HYDROCODONE BITARTRATE AND ACETAMINOPHEN 5; 325 MG/1; MG/1
1 TABLET ORAL
Qty: 45 TABLET | Refills: 0 | Status: SHIPPED | OUTPATIENT
Start: 2022-03-11 | End: 2022-01-27 | Stop reason: SDUPTHER

## 2022-01-27 NOTE — PROGRESS NOTES
Jerry Sparrow is a 80 y.o. female      Chief Complaint   Patient presents with    Post-COVID Symptoms    Hypertension         1. Have you been to the ER, urgent care clinic since your last visit? Hospitalized since your last visit? No       2. Have you seen or consulted any other health care providers outside of the [address] [place] [address] since your last visit? Include any pap smears or colon screening.    No

## 2022-01-27 NOTE — PROGRESS NOTES
Benigno Pearl  80 y.o. female  1940  BBP:[id number]  [facility] MEDICINE  Progress Note     Encounter Date: 1/27/2022    Assessment and Plan:     Encounter Diagnoses     ICD-10-CM ICD-9-CM   1. TMIVO-53  U07.1 079.89   2. Palpitations  R00.2 785.1   3. Stomatitis  K12.1 528.00   4. Essential hypertension  I10 401.9   5. 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 COVID-19  Sx resolving  Still hoarse and has stomatitis but that has improved    2. Palpitations  Normal EKG  No signs of myocarditis/pericarditis  - AMB POC EKG ROUTINE W/ 12 LEADS, INTER & REP    3. Stomatitis  improving    4. Essential hypertension  At goal by home BP's     5. Facial neuralgia  Refilled routine meds   checked. No signs of misuse or abuse on . Three separate Rx's for Feb, March and April  - HYDROcodone-acetaminophen [facility]) 5-325 mg per tablet; Take 1 Tablet by mouth every eight (8) hours as needed for Pain for up to 30 days. Max Daily Amount: 3 Tablets. Dispense: 45 Tablet; Refill: 0      I have discussed the diagnosis with the patient and the intended plan as seen in the above orders. she has expressed understanding. The patient has received an after-visit summary and questions were answered concerning future plans. I have discussed medication side effects and warnings with the patient as well. Electronically Signed: Shayla Yoon MD    Current Medications after this visit     Current Outpatient Medications   Medication Sig    [START ON 4/11/2022] HYDROcodone-acetaminophen (NORCO) 5-325 mg per tablet Take 1 Tablet by mouth every eight (8) hours as needed for Pain for up to 30 days. Max Daily Amount: 3 Tablets.  nystatin (MYCOSTATIN) 100,000 unit/mL suspension Take 5 mL by mouth four (4) times daily. swish and spit    gabapentin (NEURONTIN) 100 mg capsule One by mouth in AM and two by mouth at bedtime.     diphenoxylate-atropine (LomotiL) 2.5-0.025 mg per tablet Take 1 Tablet by mouth four (4) times daily as needed for Diarrhea. Max Daily Amount: 4 Tablets.  lisinopriL (PRINIVIL, ZESTRIL) 10 mg tablet Take 1 Tablet by mouth two (2) times a day.  amLODIPine (NORVASC) 2.5 mg tablet TAKE 1 TABLET EVERY DAY    pramipexole (MIRAPEX) 0.125 mg tablet TAKE 2 TABLETS EVERY NIGHT    meloxicam (MOBIC) 15 mg tablet TAKE 1 TABLET EVERY DAY    traZODone (DESYREL) 50 mg tablet TAKE 1 TABLET NIGHTLY    fluticasone propionate (FLONASE) 50 mcg/actuation nasal spray USE 1 SPRAY IN EACH NOSTRIL EVERY DAY    omeprazole (PRILOSEC) 20 mg capsule TAKE 1 CAPSULE EVERY DAY    benzocaine 20 % liqd Instill 4 to 5 drops of otic solution into external ear canal of affected ear(s), repeat every 1 to 2 hours if necessary     No current facility-administered medications for this visit. Medications Discontinued During This Encounter   Medication Reason    HYDROcodone-acetaminophen (NORCO) 5-325 mg per tablet REORDER    HYDROcodone-acetaminophen (NORCO) 5-325 mg per tablet REORDER    HYDROcodone-acetaminophen (NORCO) 5-325 mg per tablet REORDER     ~~~~~~~~~~~~~~~~~~~~~~~~~~~~~~~~~~~~~~~~~~~~~~~~~~~~~~~~~~~    Chief Complaint   Patient presents with    Post-COVID Symptoms    Hypertension       History provided by patient  History of Present Illness   Sophia Lawler is a 80 y.o. female who presents to clinic today for:  Post-COVID Symptoms and Hypertension    COVID  Started rx: 1/11/2022  Most of sx resolved  Food still tastes bad  By second dose of ivermectin, sx were better. Still has some hoarseness, still loss of taste and smell   Mouth still raw. No trouble with meds    Hypertension  Not at goal  Has had some hard rapid heart beat. Not really short of breath. BP's at home 221-418 systolic at home  BP cuff checked today and is accurate. Chronic pain.   Needs refill of meds  Has enough till Feb 11th   checked and is OK    Health Maintenance  Will do at future visit  Health Maintenance Due   Topic Date Due    COVID-19 Vaccine (1) Never done    DTaP/Tdap/Td series (1 - Tdap) Never done    Shingrix Vaccine Age 50> (1 of 2) Never done    Flu Vaccine (1) Never done     Review of Systems   Review of Systems   Constitutional: Positive for malaise/fatigue. Negative for chills, fever and weight loss. HENT: Positive for sore throat. Respiratory: Negative for shortness of breath. Cardiovascular: Positive for palpitations. Negative for chest pain and leg swelling. Vitals/Objective:     Vitals:    01/27/22 1105 01/27/22 1109   BP: (!) 178/74 (!) 167/80   Pulse: 86    Resp: 18    Temp: 98.1 °F (36.7 °C)    TempSrc: Temporal    SpO2: 97%    Weight: 112 lb (50.8 kg)    Height: 5' 3\" (1.6 m)      Body mass index is 19.84 kg/m². Wt Readings from Last 3 Encounters:   01/27/22 112 lb (50.8 kg)   11/11/21 117 lb (53.1 kg)   08/12/21 115 lb 3.2 oz (52.3 kg)         Objective  Physical Exam  Constitutional:       General: She is not in acute distress. Appearance: She is well-developed. She is not diaphoretic. HENT:      Head: Normocephalic. Right Ear: External ear normal.      Left Ear: External ear normal.      Nose: Nose normal.      Mouth/Throat:      Mouth: Mucous membranes are moist.      Tongue: No lesions. Comments: glossitis  Eyes:      General:         Right eye: No discharge. Left eye: No discharge. Conjunctiva/sclera: Conjunctivae normal.   Neck:      Thyroid: No thyromegaly. Cardiovascular:      Rate and Rhythm: Normal rate and regular rhythm. Heart sounds: Normal heart sounds. No murmur heard. No friction rub. No gallop. Pulmonary:      Effort: Pulmonary effort is normal. No respiratory distress. Breath sounds: Normal breath sounds. No wheezing or rales. Musculoskeletal:      Cervical back: Neck supple. Lymphadenopathy:      Cervical: No cervical adenopathy. Neurological:      Mental Status: She is alert and oriented to person, place, and time.    Psychiatric: Behavior: Behavior normal.         Thought Content: Thought content normal.         Judgment: Judgment normal.           No results found for this or any previous visit (from the past 24 hour(s)). Disposition     Follow-up and Dispositions  ·   Return in about 3 months (around 4/27/2022) for Medication follow up, Blood pressure follow up. No future appointments. History   Patient's past medical, surgical and family histories were reviewed and updated. 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     Fibromyalgia     Generalized osteoarthritis 7/18/2018    GERD (gastroesophageal reflux disease)     Hyperlipidemia 7/18/2018    Hypertension     IBS (irritable bowel syndrome)     Lower back pain 7/18/2018    Osteoporosis 7/18/2018    Restless legs      Past Surgical History:   Procedure Laterality Date    HX COLONOSCOPY  01/2010    HX HYSTERECTOMY      HX OTHER SURGICAL      lumbar disc surgery    HX SALPINGO-OOPHORECTOMY       Family History   Problem Relation Age of Onset    Stroke Mother     Heart Attack Father     Hypertension Brother     Inflammatory Bowel Dz Brother      Social History     Tobacco Use    Smoking status: Never Smoker    Smokeless tobacco: Never Used   Substance Use Topics    Alcohol use: No    Drug use: No       Allergies     Allergies   Allergen Reactions    Alendronate Unknown (comments)    Doxycycline Unknown (comments)    Erythromycin Unknown (comments)    Nsaids (Non-Steroidal Anti-Inflammatory Drug) Unknown (comments)    Pcn [Penicillins] Unknown (comments)

## 2022-03-09 DIAGNOSIS — G25.81 RESTLESS LEGS SYNDROME: ICD-10-CM

## 2022-03-09 NOTE — TELEPHONE ENCOUNTER
PCP: Pelon Stubbs MD    Last appt: 1/27/2022  No future appointments.     Requested Prescriptions     Pending Prescriptions Disp Refills    pramipexole (MIRAPEX) 0.125 mg tablet 180 Tablet 1       Prior labs and Blood pressures:  BP Readings from Last 3 Encounters:   01/27/22 (!) 167/80   11/11/21 (!) 204/72   08/12/21 (!) 160/68     Lab Results   Component Value Date/Time    Sodium 140 04/28/2021 11:28 AM    Potassium 4.6 04/28/2021 11:28 AM    Chloride 106 04/28/2021 11:28 AM    CO2 28 04/28/2021 11:28 AM    Anion gap 6 04/28/2021 11:28 AM    Glucose 90 04/28/2021 11:28 AM    BUN 13 04/28/2021 11:28 AM    Creatinine 0.82 04/28/2021 11:28 AM    BUN/Creatinine ratio 16 04/28/2021 11:28 AM    GFR est AA >60 04/28/2021 11:28 AM    GFR est non-AA >60 04/28/2021 11:28 AM    Calcium 10.2 (H) 04/28/2021 11:28 AM     No results found for: HBA1C, HWO8UPAF, CYF6LVJG  Lab Results   Component Value Date/Time    Cholesterol, total 236 (H) 02/21/2019 08:22 AM    HDL Cholesterol 88 02/21/2019 08:22 AM    LDL, calculated 124 (H) 02/21/2019 08:22 AM    VLDL, calculated 24 02/21/2019 08:22 AM    Triglyceride 118 02/21/2019 08:22 AM     Lab Results   Component Value Date/Time    VITAMIN D, 25-HYDROXY 39.7 02/21/2019 08:22 AM       Lab Results   Component Value Date/Time    TSH 2.63 04/28/2021 11:28 AM

## 2022-03-10 RX ORDER — PRAMIPEXOLE DIHYDROCHLORIDE 0.12 MG/1
TABLET ORAL
Qty: 180 TABLET | Refills: 1 | Status: SHIPPED | OUTPATIENT
Start: 2022-03-10 | End: 2022-08-08

## 2022-03-18 PROBLEM — I10 HTN (HYPERTENSION): Status: ACTIVE | Noted: 2018-07-18

## 2022-03-18 PROBLEM — E78.5 HYPERLIPIDEMIA: Status: ACTIVE | Noted: 2018-07-18

## 2022-03-18 PROBLEM — G50.0: Status: ACTIVE | Noted: 2019-01-23

## 2022-03-18 PROBLEM — R51.9 FACIAL PAIN: Status: ACTIVE | Noted: 2018-07-18

## 2022-03-19 PROBLEM — M54.50 LOWER BACK PAIN: Status: ACTIVE | Noted: 2018-07-18

## 2022-03-19 PROBLEM — M81.0 OSTEOPOROSIS: Status: ACTIVE | Noted: 2018-07-18

## 2022-03-20 PROBLEM — M15.9 GENERALIZED OSTEOARTHRITIS: Status: ACTIVE | Noted: 2018-07-18

## 2022-05-09 ENCOUNTER — VIRTUAL VISIT (OUTPATIENT)
Dept: FAMILY MEDICINE CLINIC | Age: 82
End: 2022-05-09
Payer: MEDICARE

## 2022-05-09 DIAGNOSIS — G51.8 FACIAL NEURALGIA: Primary | ICD-10-CM

## 2022-05-09 DIAGNOSIS — I10 ESSENTIAL HYPERTENSION: ICD-10-CM

## 2022-05-09 DIAGNOSIS — F11.99 OPIOID USE, UNSPECIFIED WITH UNSPECIFIED OPIOID-INDUCED DISORDER (HCC): ICD-10-CM

## 2022-05-09 PROCEDURE — 1090F PRES/ABSN URINE INCON ASSESS: CPT | Performed by: NURSE PRACTITIONER

## 2022-05-09 PROCEDURE — G8427 DOCREV CUR MEDS BY ELIG CLIN: HCPCS | Performed by: NURSE PRACTITIONER

## 2022-05-09 PROCEDURE — G8756 NO BP MEASURE DOC: HCPCS | Performed by: NURSE PRACTITIONER

## 2022-05-09 PROCEDURE — 1101F PT FALLS ASSESS-DOCD LE1/YR: CPT | Performed by: NURSE PRACTITIONER

## 2022-05-09 PROCEDURE — G8432 DEP SCR NOT DOC, RNG: HCPCS | Performed by: NURSE PRACTITIONER

## 2022-05-09 PROCEDURE — 99214 OFFICE O/P EST MOD 30 MIN: CPT | Performed by: NURSE PRACTITIONER

## 2022-05-09 RX ORDER — HYDROCODONE BITARTRATE AND ACETAMINOPHEN 5; 325 MG/1; MG/1
1 TABLET ORAL
Qty: 45 TABLET | Refills: 0 | Status: SHIPPED | OUTPATIENT
Start: 2022-07-11 | End: 2022-08-10 | Stop reason: SDUPTHER

## 2022-05-09 RX ORDER — HYDROCODONE BITARTRATE AND ACETAMINOPHEN 5; 325 MG/1; MG/1
1 TABLET ORAL
Qty: 45 TABLET | Refills: 0 | Status: SHIPPED | OUTPATIENT
Start: 2022-05-11 | End: 2022-06-10

## 2022-05-09 RX ORDER — HYDROCODONE BITARTRATE AND ACETAMINOPHEN 5; 325 MG/1; MG/1
1 TABLET ORAL
Qty: 45 TABLET | Refills: 0 | Status: SHIPPED | OUTPATIENT
Start: 2022-06-11 | End: 2022-07-10

## 2022-05-09 NOTE — PROGRESS NOTES
Micah Bennett is a 80 y.o. female who was seen by synchronous (real-time) audio-video technology on 5/9/2022 for Medication Refill and Hypertension        Assessment & Plan:   Diagnoses and all orders for this visit:    1. Facial neuralgia  -     HYDROcodone-acetaminophen (NORCO) 5-325 mg per tablet; Take 1 Tablet by mouth every eight (8) hours as needed for Pain for up to 30 days. Max Daily Amount: 3 Tablets. Indications: pain  -     HYDROcodone-acetaminophen (NORCO) 5-325 mg per tablet; Take 1 Tablet by mouth every eight (8) hours as needed for Pain for up to 29 days. Max Daily Amount: 3 Tablets.  -     HYDROcodone-acetaminophen (NORCO) 5-325 mg per tablet; Take 1 Tablet by mouth every eight (8) hours as needed for Pain for up to 30 days. Max Daily Amount: 3 Tablets. 2. Essential hypertension    3. Opioid use, unspecified with unspecified opioid-induced disorder    Patient was seen by virtual video. Patient discussed that she had been getting some low blood pressure numbers. She denies any weakness or syncope. This has happened in the past however when her blood pressure got too low. She has reduced her lisinopril use to 1 time daily. Her blood pressures are now at a good controlled level reported 126/68. We will continue with this change for now. She is also in need of medication refill for hydrocodone. I discussed this with Dr. Jeannine Granados and she does take this daily for her facial neuralgia. We discussed safety with this medication use. Patient agrees to follow-up as needed and to monitor blood pressure. I spent at least 20 minutes on this visit with this established patient. Subjective:       Prior to Admission medications    Medication Sig Start Date End Date Taking? Authorizing Provider   HYDROcodone-acetaminophen (NORCO) 5-325 mg per tablet Take 1 Tablet by mouth every eight (8) hours as needed for Pain for up to 30 days. Max Daily Amount: 3 Tablets.  Indications: pain 5/11/22 6/10/22 Yes Micki Madera NP   HYDROcodone-acetaminophen [facility]) 5-325 mg per tablet Take 1 Tablet by mouth every eight (8) hours as needed for Pain for up to 29 days. Max Daily Amount: 3 Tablets. 6/11/22 7/10/22 Yes Micki Madera NP   HYDROcodone-acetaminophen [facility]) 5-325 mg per tablet Take 1 Tablet by mouth every eight (8) hours as needed for Pain for up to 30 days. Max Daily Amount: 3 Tablets. 7/11/22 8/10/22 Yes Micki Madera NP   pramipexole (MIRAPEX) 0.125 mg tablet TAKE 2 TABLETS EVERY NIGHT 3/10/22   Stanley Singh MD   omeprazole (PRILOSEC) 20 mg capsule TAKE 1 CAPSULE EVERY DAY 1/27/22   Stanley Singh MD   fluticasone propionate [facility]) 50 mcg/actuation nasal spray USE 1 SPRAY IN EACH NOSTRIL EVERY DAY 1/27/22   Stanley Singh MD   HYDROcodone-acetaminophen [facility]) 5-325 mg per tablet Take 1 Tablet by mouth every eight (8) hours as needed for Pain for up to 30 days. Max Daily Amount: 3 Tablets. 4/11/22 5/9/22  Stanley Singh MD   nystatin (MYCOSTATIN) 100,000 unit/mL suspension Take 5 mL by mouth four (4) times daily. swish and spit 1/17/22   Stanley Singh MD   gabapentin (NEURONTIN) 100 mg capsule One by mouth in AM and two by mouth at bedtime. 11/11/21   Stanley Singh MD   diphenoxylate-atropine (LomotiL) 2.5-0.025 mg per tablet Take 1 Tablet by mouth four (4) times daily as needed for Diarrhea. Max Daily Amount: 4 Tablets. 11/11/21   Stanley Singh MD   lisinopriL (PRINIVIL, ZESTRIL) 10 mg tablet Take 1 Tablet by mouth two (2) times a day.  11/11/21   Stanley Singh MD   amLODIPine (NORVASC) 2.5 mg tablet TAKE 1 TABLET EVERY DAY 11/11/21   Stanley Singh MD   meloxicam (MOBIC) 15 mg tablet TAKE 1 TABLET EVERY DAY 5/13/21   Stanley Singh MD   traZODone (DESYREL) 50 mg tablet TAKE 1 TABLET NIGHTLY 4/12/21   Stanley Singh MD   benzocaine 20 % liqd Instill 4 to 5 drops of otic solution into external ear canal of affected ear(s), repeat every 1 to 2 hours if necessary 2/20/19   Hugo Merritt MD     Patient Active Problem List   Diagnosis Code    Body mass index (BMI) 20.0-20.9, adult Z68.20    HTN (hypertension) I10    Facial pain R51.9    Generalized osteoarthritis M15.9    Hyperlipidemia E78.5    Lower back pain M54.50    Osteoporosis M81.0    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    Opioid use, unspecified with unspecified opioid-induced disorder F11.99     Patient Active Problem List    Diagnosis Date Noted    Opioid use, unspecified with unspecified opioid-induced disorder 05/09/2022    Infraorbital neuralgia 01/23/2019    Body mass index (BMI) 20.0-20.9, adult 07/18/2018    HTN (hypertension) 07/18/2018    Facial pain 07/18/2018    Generalized osteoarthritis 07/18/2018    Hyperlipidemia 07/18/2018    Lower back pain 07/18/2018    Osteoporosis 07/18/2018     Current Outpatient Medications   Medication Sig Dispense Refill    [START ON 5/11/2022] HYDROcodone-acetaminophen (NORCO) 5-325 mg per tablet Take 1 Tablet by mouth every eight (8) hours as needed for Pain for up to 30 days. Max Daily Amount: 3 Tablets. Indications: pain 45 Tablet 0    [START ON 6/11/2022] HYDROcodone-acetaminophen (NORCO) 5-325 mg per tablet Take 1 Tablet by mouth every eight (8) hours as needed for Pain for up to 29 days. Max Daily Amount: 3 Tablets. 45 Tablet 0    [START ON 7/11/2022] HYDROcodone-acetaminophen (NORCO) 5-325 mg per tablet Take 1 Tablet by mouth every eight (8) hours as needed for Pain for up to 30 days. Max Daily Amount: 3 Tablets. 45 Tablet 0    pramipexole (MIRAPEX) 0.125 mg tablet TAKE 2 TABLETS EVERY NIGHT 180 Tablet 1    omeprazole (PRILOSEC) 20 mg capsule TAKE 1 CAPSULE EVERY DAY 90 Capsule 1    fluticasone propionate (FLONASE) 50 mcg/actuation nasal spray USE 1 SPRAY IN EACH NOSTRIL EVERY DAY 32 g 1    nystatin (MYCOSTATIN) 100,000 unit/mL suspension Take 5 mL by mouth four (4) times daily.  swish and spit 60 mL 0    gabapentin (NEURONTIN) 100 mg capsule One by mouth in AM and two by mouth at bedtime. 270 Capsule 1    diphenoxylate-atropine (LomotiL) 2.5-0.025 mg per tablet Take 1 Tablet by mouth four (4) times daily as needed for Diarrhea. Max Daily Amount: 4 Tablets. 30 Tablet 1    lisinopriL (PRINIVIL, ZESTRIL) 10 mg tablet Take 1 Tablet by mouth two (2) times a day. 180 Tablet 1    amLODIPine (NORVASC) 2.5 mg tablet TAKE 1 TABLET EVERY DAY 90 Tablet 1    meloxicam (MOBIC) 15 mg tablet TAKE 1 TABLET EVERY DAY 90 Tab 1    traZODone (DESYREL) 50 mg tablet TAKE 1 TABLET NIGHTLY 90 Tab 1    benzocaine 20 % liqd Instill 4 to 5 drops of otic solution into external ear canal of affected ear(s), repeat every 1 to 2 hours if necessary 9 mL 0     Allergies   Allergen Reactions    Alendronate Unknown (comments)    Doxycycline Unknown (comments)    Erythromycin Unknown (comments)    Nsaids (Non-Steroidal Anti-Inflammatory Drug) Unknown (comments)    Pcn [Penicillins] Unknown (comments)     Past Medical History:   Diagnosis Date    Allergic rhinitis     Carpal tunnel syndrome     Facial pain 07/18/2018    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  Fibromyalgia     Generalized osteoarthritis 7/18/2018    GERD (gastroesophageal reflux disease)     Hyperlipidemia 7/18/2018    Hypertension     IBS (irritable bowel syndrome)     Lower back pain 7/18/2018    Osteoporosis 7/18/2018    Restless legs        Review of Systems   Constitutional: Negative for fever and malaise/fatigue. HENT: Negative for congestion, sinus pain and sore throat. Eyes: Negative. Respiratory: Negative for cough and shortness of breath. Cardiovascular: Negative for chest pain. Gastrointestinal: Negative for diarrhea, nausea and vomiting. Genitourinary: Negative for dysuria. Musculoskeletal: Positive for myalgias. Skin: Negative. Neurological: Negative for dizziness and headaches.    Endo/Heme/Allergies: Negative for environmental allergies. Psychiatric/Behavioral: Negative. Objective:     Patient-Reported Vitals 1/10/2022   Patient-Reported Weight 117lb   Patient-Reported Height -   Patient-Reported Pulse -   Patient-Reported Systolic  -   Patient-Reported Diastolic -        [INSTRUCTIONS:  \"[x]\" Indicates a positive item  \"[]\" Indicates a negative item  -- DELETE ALL ITEMS NOT EXAMINED]    Constitutional: [x] Appears well-developed and well-nourished [x] No apparent distress      [] Abnormal -     Mental status: [x] Alert and awake  [x] Oriented to person/place/time [x] Able to follow commands    [] Abnormal -     Eyes:   EOM    [x]  Normal    [] Abnormal -   Sclera  [x]  Normal    [] Abnormal -          Discharge [x]  None visible   [] Abnormal -     HENT: [x] Normocephalic, atraumatic  [] Abnormal -   [x] Mouth/Throat: Mucous membranes are moist    External Ears [x] Normal  [] Abnormal -    Neck: [x] No visualized mass [] Abnormal -     Pulmonary/Chest: [x] Respiratory effort normal   [x] No visualized signs of difficulty breathing or respiratory distress        [] Abnormal -      Musculoskeletal:   [x] Normal gait with no signs of ataxia         [x] Normal range of motion of neck        [] Abnormal -     Neurological:        [x] No Facial Asymmetry (Cranial nerve 7 motor function) (limited exam due to video visit)          [x] No gaze palsy        [] Abnormal -          Skin:        [x] No significant exanthematous lesions or discoloration noted on facial skin         [] Abnormal -            Psychiatric:       [x] Normal Affect [] Abnormal -        [x] No Hallucinations    Other pertinent observable physical exam findings:-        We discussed the expected course, resolution and complications of the diagnosis(es) in detail. Medication risks, benefits, costs, interactions, and alternatives were discussed as indicated. I advised her to contact the office if her condition worsens, changes or fails to improve as anticipated.  She expressed understanding with the diagnosis(es) and plan. Simone Gaona, was evaluated through a synchronous (real-time) audio-video encounter. The patient (or guardian if applicable) is aware that this is a billable service, which includes applicable co-pays. Verbal consent to proceed has been obtained. The visit was conducted pursuant to the emergency declaration under the [address] [place] [address], [address] [place] [address] authority and the [email or web address] General Act. Patient identification was verified, and a caregiver was present when appropriate. The patient was located at home in a state where the provider was licensed to provide care.       Afsaneh Fierro NP

## 2022-08-07 DIAGNOSIS — G25.81 RESTLESS LEGS SYNDROME: ICD-10-CM

## 2022-08-08 RX ORDER — PRAMIPEXOLE DIHYDROCHLORIDE 0.12 MG/1
TABLET ORAL
Qty: 180 TABLET | Refills: 1 | Status: SHIPPED | OUTPATIENT
Start: 2022-08-08

## 2022-08-10 ENCOUNTER — OFFICE VISIT (OUTPATIENT)
Dept: FAMILY MEDICINE CLINIC | Age: 82
End: 2022-08-10
Payer: MEDICARE

## 2022-08-10 VITALS
SYSTOLIC BLOOD PRESSURE: 193 MMHG | TEMPERATURE: 97.7 F | HEIGHT: 63 IN | HEART RATE: 83 BPM | DIASTOLIC BLOOD PRESSURE: 75 MMHG | OXYGEN SATURATION: 96 % | WEIGHT: 113 LBS | RESPIRATION RATE: 18 BRPM | BODY MASS INDEX: 20.02 KG/M2

## 2022-08-10 DIAGNOSIS — G51.8 FACIAL NEURALGIA: ICD-10-CM

## 2022-08-10 DIAGNOSIS — I10 ESSENTIAL HYPERTENSION: Primary | ICD-10-CM

## 2022-08-10 DIAGNOSIS — G89.29 CHRONIC PAIN OF RIGHT KNEE: ICD-10-CM

## 2022-08-10 DIAGNOSIS — G25.81 RESTLESS LEGS SYNDROME: ICD-10-CM

## 2022-08-10 DIAGNOSIS — M25.561 CHRONIC PAIN OF RIGHT KNEE: ICD-10-CM

## 2022-08-10 LAB
ALBUMIN SERPL-MCNC: 4 G/DL (ref 3.5–5)
ALBUMIN/GLOB SERPL: 1.3 {RATIO} (ref 1.1–2.2)
ALP SERPL-CCNC: 67 U/L (ref 45–117)
ALT SERPL-CCNC: 17 U/L (ref 12–78)
AST SERPL-CCNC: 17 U/L (ref 15–37)
BILIRUB DIRECT SERPL-MCNC: 0.2 MG/DL (ref 0–0.2)
BILIRUB SERPL-MCNC: 0.5 MG/DL (ref 0.2–1)
CHOLEST SERPL-MCNC: 235 MG/DL
GLOBULIN SER CALC-MCNC: 3.2 G/DL (ref 2–4)
HDLC SERPL-MCNC: 85 MG/DL
HDLC SERPL: 2.8 {RATIO} (ref 0–5)
LDLC SERPL CALC-MCNC: 129 MG/DL (ref 0–100)
PROT SERPL-MCNC: 7.2 G/DL (ref 6.4–8.2)
TRIGL SERPL-MCNC: 105 MG/DL (ref ?–150)
VLDLC SERPL CALC-MCNC: 21 MG/DL

## 2022-08-10 PROCEDURE — G8427 DOCREV CUR MEDS BY ELIG CLIN: HCPCS | Performed by: FAMILY MEDICINE

## 2022-08-10 PROCEDURE — 99214 OFFICE O/P EST MOD 30 MIN: CPT | Performed by: FAMILY MEDICINE

## 2022-08-10 PROCEDURE — G8510 SCR DEP NEG, NO PLAN REQD: HCPCS | Performed by: FAMILY MEDICINE

## 2022-08-10 PROCEDURE — 1101F PT FALLS ASSESS-DOCD LE1/YR: CPT | Performed by: FAMILY MEDICINE

## 2022-08-10 PROCEDURE — G8536 NO DOC ELDER MAL SCRN: HCPCS | Performed by: FAMILY MEDICINE

## 2022-08-10 PROCEDURE — G8754 DIAS BP LESS 90: HCPCS | Performed by: FAMILY MEDICINE

## 2022-08-10 PROCEDURE — 1090F PRES/ABSN URINE INCON ASSESS: CPT | Performed by: FAMILY MEDICINE

## 2022-08-10 PROCEDURE — 1123F ACP DISCUSS/DSCN MKR DOCD: CPT | Performed by: FAMILY MEDICINE

## 2022-08-10 PROCEDURE — G8753 SYS BP > OR = 140: HCPCS | Performed by: FAMILY MEDICINE

## 2022-08-10 PROCEDURE — G8420 CALC BMI NORM PARAMETERS: HCPCS | Performed by: FAMILY MEDICINE

## 2022-08-10 RX ORDER — LISINOPRIL 10 MG/1
10 TABLET ORAL 2 TIMES DAILY
Qty: 180 TABLET | Refills: 1 | Status: SHIPPED | OUTPATIENT
Start: 2022-08-10

## 2022-08-10 RX ORDER — HYDROCODONE BITARTRATE AND ACETAMINOPHEN 5; 325 MG/1; MG/1
1 TABLET ORAL
Qty: 45 TABLET | Refills: 0 | Status: SHIPPED | OUTPATIENT
Start: 2022-09-10 | End: 2022-10-10

## 2022-08-10 RX ORDER — HYDROCODONE BITARTRATE AND ACETAMINOPHEN 5; 325 MG/1; MG/1
1 TABLET ORAL
Qty: 45 TABLET | Refills: 0 | Status: SHIPPED | OUTPATIENT
Start: 2022-10-10 | End: 2022-11-09

## 2022-08-10 RX ORDER — AMLODIPINE BESYLATE 2.5 MG/1
TABLET ORAL
Qty: 90 TABLET | Refills: 1 | Status: SHIPPED | OUTPATIENT
Start: 2022-08-10

## 2022-08-10 RX ORDER — GABAPENTIN 100 MG/1
CAPSULE ORAL
Qty: 270 CAPSULE | Refills: 1 | Status: SHIPPED | OUTPATIENT
Start: 2022-08-10

## 2022-08-10 RX ORDER — HYDROCODONE BITARTRATE AND ACETAMINOPHEN 5; 325 MG/1; MG/1
1 TABLET ORAL
Qty: 45 TABLET | Refills: 0 | Status: SHIPPED | OUTPATIENT
Start: 2022-08-10 | End: 2022-09-09

## 2022-08-10 RX ORDER — MELOXICAM 15 MG/1
TABLET ORAL
Qty: 90 TABLET | Refills: 1 | Status: SHIPPED | OUTPATIENT
Start: 2022-08-10

## 2022-08-10 NOTE — PROGRESS NOTES
Devi Serrano  80 y.o. female  1940  DCL:[id number]  [facility] MEDICINE  Progress Note     Encounter Date: 8/10/2022    Assessment and Plan:     Encounter Diagnoses     ICD-10-CM ICD-9-CM   1. Essential hypertension  I10 401.9   2. 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 Restless legs syndrome  G25.81 333.94   4. Chronic pain of right knee  M25.561 719.46    G89.29 338.29       1. Essential hypertension  Not at goal  Advised to take amlodipine as prescribed  Has had her home BP cuff checked in past and it is thought to be accurate.  - lisinopriL (PRINIVIL, ZESTRIL) 10 mg tablet; Take 1 Tablet by mouth two (2) times a day. Dispense: 180 Tablet; Refill: 1  - amLODIPine (NORVASC) 2.5 mg tablet; TAKE 1 TABLET EVERY DAY  Dispense: 90 Tablet; Refill: 1  - METABOLIC PANEL, BASIC; Future  - MICROALBUMIN, UR, RAND W/ MICROALB/CREAT RATIO; Future  - LIPID PANEL; Future  - HEPATIC FUNCTION PANEL; Future    2. Facial neuralgia  Continue meds  Some of her fatigue and exhaustion could be pain meds so advised she would be better tapering off of those rather than skipping BP meds  - gabapentin (NEURONTIN) 100 mg capsule; One by mouth in AM and two by mouth at bedtime. Dispense: 270 Capsule; Refill: 1  - HYDROcodone-acetaminophen (NORCO) 5-325 mg per tablet; Take 1 Tablet by mouth every eight (8) hours as needed for Pain for up to 30 days. Max Daily Amount: 3 Tablets. Dispense: 45 Tablet; Refill: 0  - HYDROcodone-acetaminophen (NORCO) 5-325 mg per tablet; Take 1 Tablet by mouth every eight (8) hours as needed for Pain for up to 30 days. Max Daily Amount: 3 Tablets. Dispense: 45 Tablet; Refill: 0  - HYDROcodone-acetaminophen (NORCO) 5-325 mg per tablet; Take 1 Tablet by mouth every eight (8) hours as needed for Pain for up to 30 days. Max Daily Amount: 3 Tablets. Dispense: 45 Tablet; Refill: 0  - meloxicam (MOBIC) 15 mg tablet; TAKE 1 TABLET EVERY DAY  Dispense: 90 Tablet; Refill: 1    3.  Restless legs syndrome  Continue mirapex    4. Chronic pain of right knee  Continues PRN meloxicam    I have discussed the diagnosis with the patient and the intended plan as seen in the above orders. she has expressed understanding. The patient has received an after-visit summary and questions were answered concerning future plans. I have discussed medication side effects and warnings with the patient as well. Electronically Signed: Je Fernando MD    Current Medications after this visit     Current Outpatient Medications   Medication Sig    lisinopriL (PRINIVIL, ZESTRIL) 10 mg tablet Take 1 Tablet by mouth two (2) times a day. amLODIPine (NORVASC) 2.5 mg tablet TAKE 1 TABLET EVERY DAY    gabapentin (NEURONTIN) 100 mg capsule One by mouth in AM and two by mouth at bedtime. HYDROcodone-acetaminophen (NORCO) 5-325 mg per tablet Take 1 Tablet by mouth every eight (8) hours as needed for Pain for up to 30 days. Max Daily Amount: 3 Tablets. [START ON 9/10/2022] HYDROcodone-acetaminophen (NORCO) 5-325 mg per tablet Take 1 Tablet by mouth every eight (8) hours as needed for Pain for up to 30 days. Max Daily Amount: 3 Tablets. [START ON 10/10/2022] HYDROcodone-acetaminophen (NORCO) 5-325 mg per tablet Take 1 Tablet by mouth every eight (8) hours as needed for Pain for up to 30 days. Max Daily Amount: 3 Tablets. meloxicam (MOBIC) 15 mg tablet TAKE 1 TABLET EVERY DAY    pramipexole (MIRAPEX) 0.125 mg tablet TAKE 2 TABLETS EVERY NIGHT    omeprazole (PRILOSEC) 20 mg capsule TAKE 1 CAPSULE EVERY DAY    fluticasone propionate (FLONASE) 50 mcg/actuation nasal spray USE 1 SPRAY IN EACH NOSTRIL EVERY DAY    nystatin (MYCOSTATIN) 100,000 unit/mL suspension Take 5 mL by mouth four (4) times daily. swish and spit    diphenoxylate-atropine (LomotiL) 2.5-0.025 mg per tablet Take 1 Tablet by mouth four (4) times daily as needed for Diarrhea. Max Daily Amount: 4 Tablets.     traZODone (DESYREL) 50 mg tablet TAKE 1 TABLET NIGHTLY    benzocaine 20 % liqd Instill 4 to 5 drops of otic solution into external ear canal of affected ear(s), repeat every 1 to 2 hours if necessary     No current facility-administered medications for this visit. Medications Discontinued During This Encounter   Medication Reason    gabapentin (NEURONTIN) 100 mg capsule REORDER    lisinopriL (PRINIVIL, ZESTRIL) 10 mg tablet REORDER    amLODIPine (NORVASC) 2.5 mg tablet REORDER    HYDROcodone-acetaminophen (NORCO) 5-325 mg per tablet REORDER    meloxicam (MOBIC) 15 mg tablet REORDER     ~~~~~~~~~~~~~~~~~~~~~~~~~~~~~~~~~~~~~~~~~~~~~~~~~~~~~~~~~~~    Chief Complaint   Patient presents with    Medication Evaluation     Patient states may need updated bloodwork        History provided by patient  History of Present Illness   Ne Cochran is a 80 y.o. female who presents to clinic today for:  Medication Evaluation (Patient states may need updated bloodwork )    Hypertension  Up here after had to walk around the building  Home systolics  [id number]  Admits to going off of amlodipine  Was worried BP was too low, she felt weak and tired, and she might fall. Felt better when she would lie down for an hours or so. Has been saeed a lot of tomatoes. Legs and feet feel cold and uses warm water    Neuropathy face and legs  Still on gabapentin 1 in AM and 2 in PM  Still on pramipexole qhs and 1.5 hydrocodone daily for facial pain  Facial pain continues to be a problem  Meds help her and relieve her   is OK  She has pain even if AC blows on her face as it did recently on trip with family. Health Maintenance  Will do at future visit  Health Maintenance Due   Topic Date Due    COVID-19 Vaccine (1) Never done    DTaP/Tdap/Td series (1 - Tdap) Never done    Shingrix Vaccine Age 50> (1 of 2) Never done    Medicare Yearly Exam  08/13/2022     Review of Systems   Review of Systems   Constitutional:  Positive for malaise/fatigue.    Respiratory:  Negative for cough and shortness of breath. Cardiovascular:  Negative for chest pain and palpitations. Gastrointestinal:  Negative for blood in stool. Genitourinary:  Negative for hematuria. Musculoskeletal:  Positive for joint pain. Neurological:  Positive for sensory change. Psychiatric/Behavioral: Negative. Vitals/Objective:     Vitals:    08/10/22 0842 08/10/22 0846   BP: (!) 188/74 (!) 193/75   Pulse: 83    Resp: 18    Temp: 97.7 °F (36.5 °C)    TempSrc: Temporal    SpO2: 96%    Weight: 113 lb (51.3 kg)    Height: 5' 3\" (1.6 m)      Body mass index is 20.02 kg/m². Wt Readings from Last 3 Encounters:   08/10/22 113 lb (51.3 kg)   01/27/22 112 lb (50.8 kg)   11/11/21 117 lb (53.1 kg)         Objective  Physical Exam  Vitals and nursing note reviewed. Constitutional:       Appearance: Normal appearance. She is not toxic-appearing. HENT:      Head: Normocephalic and atraumatic. Cardiovascular:      Rate and Rhythm: Normal rate and regular rhythm. Heart sounds: Normal heart sounds. No murmur heard. No gallop. Pulmonary:      Effort: Pulmonary effort is normal. No respiratory distress. Breath sounds: Normal breath sounds. No wheezing, rhonchi or rales. Musculoskeletal:      Cervical back: No muscular tenderness. Lymphadenopathy:      Cervical: No cervical adenopathy. Neurological:      Mental Status: She is alert. Psychiatric:         Mood and Affect: Mood normal.         Behavior: Behavior normal.         Thought Content: Thought content normal.         Judgment: Judgment normal.       No results found for this or any previous visit (from the past 24 hour(s)). Disposition     Follow-up and Dispositions    Return in about 3 months (around 11/10/2022) for Medication follow up. No future appointments. History   Patient's past medical, surgical and family histories were reviewed and updated.     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     Fibromyalgia     Generalized osteoarthritis 7/18/2018    GERD (gastroesophageal reflux disease)     Hyperlipidemia 7/18/2018    Hypertension     IBS (irritable bowel syndrome)     Lower back pain 7/18/2018    Osteoporosis 7/18/2018    Restless legs      Past Surgical History:   Procedure Laterality Date    HX COLONOSCOPY  01/2010    HX HYSTERECTOMY      HX OTHER SURGICAL      lumbar disc surgery    HX SALPINGO-OOPHORECTOMY       Family History   Problem Relation Age of Onset    Stroke Mother     Heart Attack Father     Hypertension Brother     Inflammatory Bowel Dz Brother      Social History     Tobacco Use    Smoking status: Never    Smokeless tobacco: Never   Substance Use Topics    Alcohol use: No    Drug use: No       Allergies     Allergies   Allergen Reactions    Alendronate Unknown (comments)    Doxycycline Unknown (comments)    Erythromycin Unknown (comments)    Nsaids (Non-Steroidal Anti-Inflammatory Drug) Unknown (comments)    Pcn [Penicillins] Unknown (comments)

## 2022-08-10 NOTE — PROGRESS NOTES
Reviewed record in preparation for visit and have obtained necessary documentation. Identified pt with two pt identifiers(name and ). Chief Complaint   Patient presents with    Medication Evaluation     Patient states may need updated bloodwork      Blood pressure (!) 193/75, pulse 83, temperature 97.7 °F (36.5 °C), temperature source Temporal, resp. rate 18, height 5' 3\" (1.6 m), weight 113 lb (51.3 kg), SpO2 96 %. Health Maintenance Due   Topic Date Due    COVID-19 Vaccine (1) Never done    DTaP/Tdap/Td  (1 - Tdap) Never done    Shingles Vaccine (1 of 2) Never done    Annual Well Visit  2022       Ms. Halina Haley has a reminder for a \"due or due soon\" health maintenance. I have asked that she discuss this further with her primary care provider for follow-up on this health maintenance.       Coordination of Care Questionnaire:  :     1) Have you been to an emergency room, urgent care clinic since your last visit? no   Hospitalized since your last visit? no             2) Have you seen or consulted any other health care providers outside of [address] [place] [address] since your last visit? no

## 2022-08-29 DIAGNOSIS — I10 ESSENTIAL HYPERTENSION: Primary | ICD-10-CM

## 2022-08-29 NOTE — PROGRESS NOTES
Your cholesterol and liver tests are fine. Unfortunately, we did not get the kidney function and blood sugar tests performed as ordered. We can repeat these next time you are in the office. Or, you can stop by and get them anytime. I have left a standing order for you to get them done.   [facility]

## 2022-11-08 ENCOUNTER — OFFICE VISIT (OUTPATIENT)
Dept: FAMILY MEDICINE CLINIC | Age: 82
End: 2022-11-08
Payer: MEDICARE

## 2022-11-08 VITALS
HEIGHT: 63 IN | OXYGEN SATURATION: 98 % | SYSTOLIC BLOOD PRESSURE: 186 MMHG | RESPIRATION RATE: 17 BRPM | BODY MASS INDEX: 20.41 KG/M2 | WEIGHT: 115.2 LBS | HEART RATE: 80 BPM | TEMPERATURE: 97.8 F | DIASTOLIC BLOOD PRESSURE: 81 MMHG

## 2022-11-08 DIAGNOSIS — I10 ESSENTIAL HYPERTENSION: ICD-10-CM

## 2022-11-08 DIAGNOSIS — G51.8 FACIAL NEURALGIA: ICD-10-CM

## 2022-11-08 DIAGNOSIS — Z00.00 MEDICARE ANNUAL WELLNESS VISIT, SUBSEQUENT: Primary | ICD-10-CM

## 2022-11-08 PROCEDURE — G8510 SCR DEP NEG, NO PLAN REQD: HCPCS | Performed by: FAMILY MEDICINE

## 2022-11-08 PROCEDURE — 3078F DIAST BP <80 MM HG: CPT | Performed by: FAMILY MEDICINE

## 2022-11-08 PROCEDURE — G8420 CALC BMI NORM PARAMETERS: HCPCS | Performed by: FAMILY MEDICINE

## 2022-11-08 PROCEDURE — 1090F PRES/ABSN URINE INCON ASSESS: CPT | Performed by: FAMILY MEDICINE

## 2022-11-08 PROCEDURE — 99214 OFFICE O/P EST MOD 30 MIN: CPT | Performed by: FAMILY MEDICINE

## 2022-11-08 PROCEDURE — G0439 PPPS, SUBSEQ VISIT: HCPCS | Performed by: FAMILY MEDICINE

## 2022-11-08 PROCEDURE — 1101F PT FALLS ASSESS-DOCD LE1/YR: CPT | Performed by: FAMILY MEDICINE

## 2022-11-08 PROCEDURE — 3075F SYST BP GE 130 - 139MM HG: CPT | Performed by: FAMILY MEDICINE

## 2022-11-08 PROCEDURE — G8754 DIAS BP LESS 90: HCPCS | Performed by: FAMILY MEDICINE

## 2022-11-08 PROCEDURE — G8427 DOCREV CUR MEDS BY ELIG CLIN: HCPCS | Performed by: FAMILY MEDICINE

## 2022-11-08 PROCEDURE — G8536 NO DOC ELDER MAL SCRN: HCPCS | Performed by: FAMILY MEDICINE

## 2022-11-08 PROCEDURE — G8753 SYS BP > OR = 140: HCPCS | Performed by: FAMILY MEDICINE

## 2022-11-08 PROCEDURE — 1123F ACP DISCUSS/DSCN MKR DOCD: CPT | Performed by: FAMILY MEDICINE

## 2022-11-08 RX ORDER — HYDROCODONE BITARTRATE AND ACETAMINOPHEN 5; 325 MG/1; MG/1
1 TABLET ORAL
Qty: 45 TABLET | Refills: 0 | Status: SHIPPED | OUTPATIENT
Start: 2022-12-10 | End: 2023-01-09

## 2022-11-08 RX ORDER — HYDROCODONE BITARTRATE AND ACETAMINOPHEN 5; 325 MG/1; MG/1
1 TABLET ORAL
Qty: 45 TABLET | Refills: 0 | Status: SHIPPED | OUTPATIENT
Start: 2022-11-10 | End: 2022-12-10

## 2022-11-08 RX ORDER — HYDROCODONE BITARTRATE AND ACETAMINOPHEN 5; 325 MG/1; MG/1
1 TABLET ORAL
Qty: 45 TABLET | Refills: 0 | Status: SHIPPED | OUTPATIENT
Start: 2023-01-10 | End: 2023-02-09

## 2022-11-08 NOTE — PROGRESS NOTES
Chief Complaint   Patient presents with    Follow-up     1. Have you been to the ER, urgent care clinic since your last visit? Hospitalized since your last visit? No    2. Have you seen or consulted any other health care providers outside of the [address] [place] [address] since your last visit? Include any pap smears or colon screening.  No

## 2022-11-08 NOTE — PATIENT INSTRUCTIONS

## 2022-11-08 NOTE — PROGRESS NOTES
Shubham Myers  80 y.o. female  1940  LUR:[id number]  [facility]  Progress Note     Encounter Date: 11/8/2022    Assessment and Plan:     Encounter Diagnoses     ICD-10-CM ICD-9-CM   1. Medicare annual wellness visit, subsequent  Z00.00 V70.0   2. 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 Essential hypertension  I10 401.9       1. Medicare annual wellness visit, subsequent  updated    2. 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 This should resolve with time. If sx are severe, we can incrase gabapentin. Her infraorbital sx continue but are manageable with current meds. If they progress, I would again recommend seeing surgeon that can do nerve transplant surgery for that nerve. She will consider this  Narcotics refilled.  is ok. Would consider trying to taper NORCO if sx of SO nerve injury improve.  - HYDROcodone-acetaminophen (NORCO) 5-325 mg per tablet; Take 1 Tablet by mouth every eight (8) hours as needed for Pain for up to 30 days. Max Daily Amount: 3 Tablets. Dispense: 45 Tablet; Refill: 0  - HYDROcodone-acetaminophen (NORCO) 5-325 mg per tablet; Take 1 Tablet by mouth every eight (8) hours as needed for Pain for up to 30 days. Max Daily Amount: 3 Tablets. Dispense: 45 Tablet; Refill: 0  - HYDROcodone-acetaminophen (NORCO) 5-325 mg per tablet; Take 1 Tablet by mouth every eight (8) hours as needed for Pain for up to 30 days. Max Daily Amount: 3 Tablets. Dispense: 45 Tablet; Refill: 0    3. Essential hypertension  BP at home much better than here  She has now had presyncope twice when we tried to increase her meds so will no longer give amlodipine and will stick with just lisinopril BID. I have discussed the diagnosis with the patient and the intended plan as seen in the above orders. she has expressed understanding.   The patient has received an after-visit summary and questions were answered concerning future plans. I have discussed medication side effects and warnings with the patient as well. Electronically Signed: Divine Pineda MD    Current Medications after this visit     Current Outpatient Medications   Medication Sig    [START ON 11/10/2022] HYDROcodone-acetaminophen (NORCO) 5-325 mg per tablet Take 1 Tablet by mouth every eight (8) hours as needed for Pain for up to 30 days. Max Daily Amount: 3 Tablets. [START ON 12/10/2022] HYDROcodone-acetaminophen (NORCO) 5-325 mg per tablet Take 1 Tablet by mouth every eight (8) hours as needed for Pain for up to 30 days. Max Daily Amount: 3 Tablets. [START ON 1/10/2023] HYDROcodone-acetaminophen (NORCO) 5-325 mg per tablet Take 1 Tablet by mouth every eight (8) hours as needed for Pain for up to 30 days. Max Daily Amount: 3 Tablets. diphenoxylate-atropine (LOMOTIL) 2.5-0.025 mg per tablet TAKE 1 TABLET FOUR TIMES DAILY AS NEEDED FOR DIARRHEA (MAX DAILY AMOUNT: 4 TABLETS)    lisinopriL (PRINIVIL, ZESTRIL) 10 mg tablet Take 1 Tablet by mouth two (2) times a day.    gabapentin (NEURONTIN) 100 mg capsule One by mouth in AM and two by mouth at bedtime. meloxicam (MOBIC) 15 mg tablet TAKE 1 TABLET EVERY DAY    pramipexole (MIRAPEX) 0.125 mg tablet TAKE 2 TABLETS EVERY NIGHT    omeprazole (PRILOSEC) 20 mg capsule TAKE 1 CAPSULE EVERY DAY    fluticasone propionate (FLONASE) 50 mcg/actuation nasal spray USE 1 SPRAY IN EACH NOSTRIL EVERY DAY    nystatin (MYCOSTATIN) 100,000 unit/mL suspension Take 5 mL by mouth four (4) times daily. swish and spit    benzocaine 20 % liqd Instill 4 to 5 drops of otic solution into external ear canal of affected ear(s), repeat every 1 to 2 hours if necessary     No current facility-administered medications for this visit.      Medications Discontinued During This Encounter   Medication Reason    amLODIPine (NORVASC) 2.5 mg tablet Not A Current Medication traZODone (DESYREL) 50 mg tablet Not A Current Medication    HYDROcodone-acetaminophen (NORCO) 5-325 mg per tablet REORDER     ~~~~~~~~~~~~~~~~~~~~~~~~~~~~~~~~~~~~~~~~~~~~~~~~~~~~~~~~~~~    Chief Complaint   Patient presents with    Follow-up       History provided by patient  History of Present Illness   Jerry Sparrow is a 80 y.o. female who presents to clinic today for:  Follow-up    Hypertension  Restarted amlodipine  Was taking lisinopril BID  Brother  in August   BP had been better  Had episode of lower bp and nausea  Nearly fell and hit head same as previous   Systolic 92 after she fell. Now has a lot of pain in right orbit and scalp  Did not pass out. Had a large bruise above right orbit  Now is just on lisinopril 10 mg BID  At home -829 systolic  Home cuff was accurate when checked here. Took some extra gabapentin  Facial pain somewhat better but still hurts over orbit. Health Maintenance  Completed HM gaps at today's visit  Health Maintenance Due   Topic Date Due    COVID-19 Vaccine (1) Never done    DTaP/Tdap/Td series (1 - Tdap) Never done    Shingrix Vaccine Age 50> (1 of 2) Never done    Flu Vaccine (1) Never done     Review of Systems   Review of Systems   Respiratory:  Negative for shortness of breath. Cardiovascular:  Negative for chest pain. Gastrointestinal:  Negative for blood in stool. Genitourinary:  Negative for hematuria. Musculoskeletal:  Positive for falls. Neurological:  Positive for sensory change and headaches. Negative for loss of consciousness. Psychiatric/Behavioral: Negative. Vitals/Objective:     Vitals:    22 0853 22 0856 22 0938   BP: (!) 187/69 (!) 182/82 (!) 186/81   Pulse: 80     Resp: 17     Temp: 97.8 °F (36.6 °C)     TempSrc: Temporal     SpO2: 98%     Weight: 115 lb 3.2 oz (52.3 kg)     Height: 5' 3\" (1.6 m)       Body mass index is 20.41 kg/m².     Wt Readings from Last 3 Encounters:   22 115 lb 3.2 oz (52.3 kg) 08/10/22 113 lb (51.3 kg)   01/27/22 112 lb (50.8 kg)         Objective  Physical Exam  Vitals and nursing note reviewed. Constitutional:       General: She is not in acute distress. Appearance: Normal appearance. She is well-developed. She is not toxic-appearing or diaphoretic. HENT:      Head: Normocephalic and atraumatic. Comments: Facial pain reproduced with light touch over either IO or SO nerves right orbit     Right Ear: Tympanic membrane and external ear normal.      Left Ear: Tympanic membrane and external ear normal.      Nose: Nose normal.   Eyes:      General:         Right eye: No discharge. Left eye: No discharge. Conjunctiva/sclera: Conjunctivae normal.   Neck:      Thyroid: No thyromegaly. Cardiovascular:      Rate and Rhythm: Normal rate and regular rhythm. Heart sounds: Normal heart sounds. No murmur heard. No friction rub. No gallop. Pulmonary:      Effort: Pulmonary effort is normal. No respiratory distress. Breath sounds: Normal breath sounds. No wheezing, rhonchi or rales. Musculoskeletal:      Cervical back: Neck supple. No muscular tenderness. Lymphadenopathy:      Cervical: No cervical adenopathy. Neurological:      Mental Status: She is alert and oriented to person, place, and time. Psychiatric:         Mood and Affect: Mood normal.         Behavior: Behavior normal.         Thought Content: Thought content normal.         Judgment: Judgment normal.       No results found for this or any previous visit (from the past 24 hour(s)). Disposition     Follow-up and Dispositions    Return in about 3 months (around 2/8/2023) for Medication follow up. No future appointments. History   Patient's past medical, surgical and family histories were reviewed and updated.     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 Fibromyalgia     Generalized osteoarthritis 7/18/2018    GERD (gastroesophageal reflux disease)     Hyperlipidemia 7/18/2018    Hypertension     IBS (irritable bowel syndrome)     Lower back pain 7/18/2018    Osteoporosis 7/18/2018    Restless legs      Past Surgical History:   Procedure Laterality Date    HX COLONOSCOPY  01/2010    HX HYSTERECTOMY      HX OTHER SURGICAL      lumbar disc surgery    HX SALPINGO-OOPHORECTOMY       Family History   Problem Relation Age of Onset    Stroke Mother     Heart Attack Father     Hypertension Brother     Inflammatory Bowel Dz Brother      Social History     Tobacco Use    Smoking status: Never    Smokeless tobacco: Never   Substance Use Topics    Alcohol use: No    Drug use: No       Allergies     Allergies   Allergen Reactions    Alendronate Unknown (comments)    Doxycycline Unknown (comments)    Erythromycin Unknown (comments)    Nsaids (Non-Steroidal Anti-Inflammatory Drug) Unknown (comments)    Pcn [Penicillins] Unknown (comments)                     This is the Subsequent Medicare Annual Wellness Exam, performed 12 months or more after the Initial AWV or the last Subsequent AWV    I have reviewed the patient's medical history in detail and updated the computerized patient record. Assessment/Plan   Education and counseling provided:  Are appropriate based on today's review and evaluation  End-of-Life planning (with patient's consent)    1. Medicare annual wellness visit, subsequent  2. Facial neuralgia  -     HYDROcodone-acetaminophen (NORCO) 5-325 mg per tablet; Take 1 Tablet by mouth every eight (8) hours as needed for Pain for up to 30 days. Max Daily Amount: 3 Tablets., Normal, Disp-45 Tablet, R-0  -     HYDROcodone-acetaminophen (NORCO) 5-325 mg per tablet; Take 1 Tablet by mouth every eight (8) hours as needed for Pain for up to 30 days.  Max Daily Amount: 3 Tablets., Normal, Disp-45 Tablet, R-0  -     HYDROcodone-acetaminophen (NORCO) 5-325 mg per tablet; Take 1 Tablet by mouth every eight (8) hours as needed for Pain for up to 30 days. Max Daily Amount: 3 Tablets., Normal, Disp-45 Tablet, R-0  3. Essential hypertension     Depression Risk Factor Screening     3 most recent PHQ Screens 11/8/2022   Little interest or pleasure in doing things Not at all   Feeling down, depressed, irritable, or hopeless Not at all   Total Score PHQ 2 0       Alcohol & Drug Abuse Risk Screen    Do you average more than 1 drink per night or more than 7 drinks a week:  No    On any one occasion in the past three months have you have had more than 3 drinks containing alcohol:  No       Opioid Risk: (Low risk score <55, High risk score ?55)  Opioid risk score: 12      Click here to complete the Controlled Substance Monitoring SmartForm    Last PDMP Matt as Reviewed:  Review User Review Instant Review Result   Brynn Flores 11/8/2022  3:11 PM Reviewed PDMP [1]     Functional Ability and Level of Safety    Hearing: Hearing is good. Vision OK, up to date   Activities of Daily Living: The home contains: no safety equipment. Patient does total self care      Ambulation: with no difficulty     Fall Risk:  Fall Risk Assessment, last 12 mths 11/11/2021   Able to walk? Yes   Fall in past 12 months? 0   Do you feel unsteady?  0   Are you worried about falling 0      Abuse Screen:  Patient is not abused       Cognitive Screening    Has your family/caregiver stated any concerns about your memory: no     Cognitive Screening: Normal - interview    Health Maintenance Due     Health Maintenance Due   Topic Date Due    COVID-19 Vaccine (1) Never done    DTaP/Tdap/Td series (1 - Tdap) Never done    Shingrix Vaccine Age 50> (1 of 2) Never done    Flu Vaccine (1) Never done       Patient Care Team   Patient Care Team:  Kevin Mckeon MD as PCP - General (Family Medicine)  Kevin Mckeon MD as PCP - [facility] Empaneled Provider  Dante Doe MD as Consulting Provider (Gastroenterology)  Rafael Kathleen MD as Consulting Provider (Neurology)  Reno Azevedo MD as Gynecologist (Obstetrics & Gynecology)  Mariana Johnson MD as Consulting Provider (Ophthalmology)  Charissa Poole MD as Consulting Provider (Orthopedic Surgery)  Sonali Colvin MD as Consulting Provider (Rheumatology Internal Medicine)    History     Patient Active Problem List   Diagnosis Code    Body mass index (BMI) 20.0-20.9, adult Z68.20    HTN (hypertension) I10    Facial pain R51.9    Generalized osteoarthritis M15.9    Hyperlipidemia E78.5    Lower back pain M54.50    Osteoporosis M81.0    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    Opioid use, unspecified with unspecified opioid-induced disorder F11.99     Past Medical History:   Diagnosis Date    Allergic rhinitis     Carpal tunnel syndrome     Facial pain 07/18/2018    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 Fibromyalgia     Generalized osteoarthritis 7/18/2018    GERD (gastroesophageal reflux disease)     Hyperlipidemia 7/18/2018    Hypertension     IBS (irritable bowel syndrome)     Lower back pain 7/18/2018    Osteoporosis 7/18/2018    Restless legs       Past Surgical History:   Procedure Laterality Date    HX COLONOSCOPY  01/2010    HX HYSTERECTOMY      HX OTHER SURGICAL      lumbar disc surgery    HX SALPINGO-OOPHORECTOMY       Current Outpatient Medications   Medication Sig Dispense Refill    [START ON 11/10/2022] HYDROcodone-acetaminophen (NORCO) 5-325 mg per tablet Take 1 Tablet by mouth every eight (8) hours as needed for Pain for up to 30 days. Max Daily Amount: 3 Tablets. 45 Tablet 0    [START ON 12/10/2022] HYDROcodone-acetaminophen (NORCO) 5-325 mg per tablet Take 1 Tablet by mouth every eight (8) hours as needed for Pain for up to 30 days. Max Daily Amount: 3 Tablets.  45 Tablet 0    [START ON 1/10/2023] HYDROcodone-acetaminophen (NORCO) 5-325 mg per tablet Take 1 Tablet by mouth every eight (8) hours as needed for Pain for up to 30 days. Max Daily Amount: 3 Tablets. 45 Tablet 0    diphenoxylate-atropine (LOMOTIL) 2.5-0.025 mg per tablet TAKE 1 TABLET FOUR TIMES DAILY AS NEEDED FOR DIARRHEA (MAX DAILY AMOUNT: 4 TABLETS) 30 Tablet 1    lisinopriL (PRINIVIL, ZESTRIL) 10 mg tablet Take 1 Tablet by mouth two (2) times a day. 180 Tablet 1    gabapentin (NEURONTIN) 100 mg capsule One by mouth in AM and two by mouth at bedtime. 270 Capsule 1    meloxicam (MOBIC) 15 mg tablet TAKE 1 TABLET EVERY DAY 90 Tablet 1    pramipexole (MIRAPEX) 0.125 mg tablet TAKE 2 TABLETS EVERY NIGHT 180 Tablet 1    omeprazole (PRILOSEC) 20 mg capsule TAKE 1 CAPSULE EVERY DAY 90 Capsule 1    fluticasone propionate (FLONASE) 50 mcg/actuation nasal spray USE 1 SPRAY IN EACH NOSTRIL EVERY DAY 32 g 1    nystatin (MYCOSTATIN) 100,000 unit/mL suspension Take 5 mL by mouth four (4) times daily.  swish and spit 60 mL 0    benzocaine 20 % liqd Instill 4 to 5 drops of otic solution into external ear canal of affected ear(s), repeat every 1 to 2 hours if necessary 9 mL 0     Allergies   Allergen Reactions    Alendronate Unknown (comments)    Doxycycline Unknown (comments)    Erythromycin Unknown (comments)    Nsaids (Non-Steroidal Anti-Inflammatory Drug) Unknown (comments)    Pcn [Penicillins] Unknown (comments)       Family History   Problem Relation Age of Onset    Stroke Mother     Heart Attack Father     Hypertension Brother     Inflammatory Bowel Dz Brother      Social History     Tobacco Use    Smoking status: Never    Smokeless tobacco: Never   Substance Use Topics    Alcohol use: No         Howard Hager MD

## 2022-12-28 ENCOUNTER — TELEPHONE (OUTPATIENT)
Dept: FAMILY MEDICINE CLINIC | Age: 82
End: 2022-12-28

## 2022-12-28 NOTE — TELEPHONE ENCOUNTER
----- Message from Billy Mancini sent at 12/28/2022 11:25 AM EST -----  Subject: Message to Provider    QUESTIONS  Information for Provider? Patient has had some biopsies done on her face   after going to the dermatologist. Dr. Lauren Saenz is familiar with the facial   trauma she has experienced. Patient would like to talk to Dr. Nathen Crandall   about melanoma dianosis.   ---------------------------------------------------------------------------  --------------  Naila Shelby INFO  [phone]; OK to leave message on voicemail  ---------------------------------------------------------------------------  --------------  SCRIPT ANSWERS  Relationship to Patient?  Self

## 2022-12-28 NOTE — TELEPHONE ENCOUNTER
Went to [facility] for and had CRYO done on the part of the face where she previously had bruising. Had another dark spot on her face near her jaw and had local anesthesia which was painful. Had a lot of pain after the biopsy. 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 She sees them again in January for follow up. Apparently she will need additional treatment and is set up to see Dr. Roxann Montez. Discussed that she should make DERM aware of the previous infraorbital nerve entrapment so that her symptoms can be managed appropriately.   [facility]

## 2023-01-10 ENCOUNTER — OFFICE VISIT (OUTPATIENT)
Dept: FAMILY MEDICINE CLINIC | Age: 83
End: 2023-01-10
Payer: MEDICARE

## 2023-01-10 VITALS
HEART RATE: 78 BPM | DIASTOLIC BLOOD PRESSURE: 71 MMHG | SYSTOLIC BLOOD PRESSURE: 196 MMHG | WEIGHT: 118.6 LBS | HEIGHT: 63 IN | BODY MASS INDEX: 21.02 KG/M2 | TEMPERATURE: 97.4 F | RESPIRATION RATE: 20 BRPM | OXYGEN SATURATION: 98 %

## 2023-01-10 DIAGNOSIS — I10 PRIMARY HYPERTENSION: ICD-10-CM

## 2023-01-10 DIAGNOSIS — G62.9 NEUROPATHY: ICD-10-CM

## 2023-01-10 DIAGNOSIS — C43.30 MALIGNANT MELANOMA OF FACE EXCLUDING EYELID, NOSE, LIP, AND EAR (HCC): ICD-10-CM

## 2023-01-10 DIAGNOSIS — G51.8 FACIAL NEURALGIA: Primary | ICD-10-CM

## 2023-01-10 DIAGNOSIS — G58.9 NERVE ENTRAPMENT: ICD-10-CM

## 2023-01-10 DIAGNOSIS — R68.89 OTHER GENERAL SYMPTOMS AND SIGNS: ICD-10-CM

## 2023-01-10 PROCEDURE — G8420 CALC BMI NORM PARAMETERS: HCPCS | Performed by: FAMILY MEDICINE

## 2023-01-10 PROCEDURE — 1090F PRES/ABSN URINE INCON ASSESS: CPT | Performed by: FAMILY MEDICINE

## 2023-01-10 PROCEDURE — 1123F ACP DISCUSS/DSCN MKR DOCD: CPT | Performed by: FAMILY MEDICINE

## 2023-01-10 PROCEDURE — 99213 OFFICE O/P EST LOW 20 MIN: CPT | Performed by: FAMILY MEDICINE

## 2023-01-10 PROCEDURE — G8427 DOCREV CUR MEDS BY ELIG CLIN: HCPCS | Performed by: FAMILY MEDICINE

## 2023-01-10 PROCEDURE — 1101F PT FALLS ASSESS-DOCD LE1/YR: CPT | Performed by: FAMILY MEDICINE

## 2023-01-10 PROCEDURE — G8536 NO DOC ELDER MAL SCRN: HCPCS | Performed by: FAMILY MEDICINE

## 2023-01-10 PROCEDURE — 3078F DIAST BP <80 MM HG: CPT | Performed by: FAMILY MEDICINE

## 2023-01-10 PROCEDURE — 3077F SYST BP >= 140 MM HG: CPT | Performed by: FAMILY MEDICINE

## 2023-01-10 PROCEDURE — G8510 SCR DEP NEG, NO PLAN REQD: HCPCS | Performed by: FAMILY MEDICINE

## 2023-01-10 NOTE — PROGRESS NOTES
1/10/2023   Ying Laughlin (: 1940) is a 80 y.o. female, new patient, here for evaluation of the following chief complaint(s):  Medication Refill (Hydrocodone - 3 months refill) and Labs (Requesting B12 lab ordered)     ASSESSMENT/PLAN:  Below is the assessment and plan developed based on review of pertinent history, physical exam, labs, studies, and medications. 1. Facial neuralgia  2. Primary hypertension  -     METABOLIC PANEL, COMPREHENSIVE; Future  -     MICROALBUMIN, UR, RAND W/ MICROALB/CREAT RATIO; Future  3. Neuropathy  -     VITAMIN B12 & FOLATE; Future  4. Other general symptoms and signs   -     VITAMIN B12 & FOLATE; Future  5. Malignant melanoma of face excluding eyelid, nose, lip, and ear ([facility] [address]    Return in about 2 weeks (around 2023) for med check WITH DR AGUILAR. SUBJECTIVE/OBJECTIVE:  HPI     1. 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 She is here for medication refill.  reviewed, patient has FILLED prescriptions for November and December but has 1 prescription for January at the pharmacy. I discussed with Dr. Jami Guzmán and it is decided that patient check with pharmacy and fill her January prescription first.  Patient to follow-up with Dr. Jami Guzmán to get her refills for months of  and April. 2. Primary hypertension  Ms. Taina Hui has been given the following recommendations today due to her elevated BP reading: rescreen BP within a minimum of 2 weeks, anti-hypertensive pharmacologic therapy ordered, and lab tests ordered. Patient declines treatment of her elevated blood pressure today. She reports home blood pressure log, systolic: [id number]. She is currently asymptomatic. She declined going to ER for elevated blood pressure at this time. 3. Neuropathy  4. Other general symptoms and signs   Patient reports symptoms of peripheral neuropathy and bilateral leg pain.   She is requesting to get vitamin B12 and folate levels checked. This is chronic. 5. Malignant melanoma of face excluding eyelid, nose, lip, and ear ([facility] [address]  Patient was recently diagnosed with melanoma face. She had 2 areas biopsy done at dermatologist office. Patient reports she was given local anesthesia prior to biopsy but after local anesthesia wore off patient was in extreme pain. 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 Patient is scheduled to have surgery on January 16 by   Dr. Dexter Guajardo. Patient is worried about pain control during and after surgery for removal of melanoma. I informed patient to call Dr. Dexter Guajardo 's office to let them know about her chronic facial pain. I will have my today's note faxed to Dr. Dexter Guajardo to make her aware of patient's chronic pain and request adequate anesthesia be provided during and after surgery. No results found for any visits on 01/10/23. Review of Systems   Constitutional: Negative. HENT: Negative. Eyes: Negative. Respiratory: Negative. Cardiovascular: Negative. Gastrointestinal: Negative. Genitourinary: Negative. Musculoskeletal: Negative. Skin: Negative. Neurological:  Positive for sensory change. Endo/Heme/Allergies: Negative. Psychiatric/Behavioral:  The patient is nervous/anxious. Physical Exam  Vitals and nursing note reviewed. HENT:      Head: Normocephalic and atraumatic. Right Ear: External ear normal.      Left Ear: External ear normal.      Nose: Nose normal.   Eyes:      Conjunctiva/sclera: Conjunctivae normal.   Cardiovascular:      Rate and Rhythm: Normal rate and regular rhythm. Pulmonary:      Effort: Pulmonary effort is normal.      Breath sounds: Normal breath sounds. Abdominal:      General: Bowel sounds are normal. There is no distension. Palpations: Abdomen is soft. Tenderness: There is no abdominal tenderness.    Musculoskeletal:         General: Normal range of motion. Cervical back: Normal range of motion and neck supple. Skin:     General: Skin is warm and dry. Neurological:      General: No focal deficit present. Mental Status: She is alert. BP (!) 196/71   Pulse 78   Temp 97.4 °F (36.3 °C) (Temporal)   Resp 20   Ht 5' 3\" (1.6 m)   Wt 118 lb 9.6 oz (53.8 kg)   SpO2 98%   BMI 21.01 kg/m²     Allergies   Allergen Reactions    Alendronate Unknown (comments)    Doxycycline Unknown (comments)    Erythromycin Unknown (comments)    Nsaids (Non-Steroidal Anti-Inflammatory Drug) Unknown (comments)    Pcn [Penicillins] Unknown (comments)       Current Outpatient Medications   Medication Sig    omeprazole (PRILOSEC) 20 mg capsule TAKE 1 CAPSULE EVERY DAY    fluticasone propionate (FLONASE) 50 mcg/actuation nasal spray USE 1 SPRAY IN EACH NOSTRIL EVERY DAY    HYDROcodone-acetaminophen (NORCO) 5-325 mg per tablet Take 1 Tablet by mouth every eight (8) hours as needed for Pain for up to 30 days. Max Daily Amount: 3 Tablets. diphenoxylate-atropine (LOMOTIL) 2.5-0.025 mg per tablet TAKE 1 TABLET FOUR TIMES DAILY AS NEEDED FOR DIARRHEA (MAX DAILY AMOUNT: 4 TABLETS)    lisinopriL (PRINIVIL, ZESTRIL) 10 mg tablet Take 1 Tablet by mouth two (2) times a day.    gabapentin (NEURONTIN) 100 mg capsule One by mouth in AM and two by mouth at bedtime. meloxicam (MOBIC) 15 mg tablet TAKE 1 TABLET EVERY DAY    pramipexole (MIRAPEX) 0.125 mg tablet TAKE 2 TABLETS EVERY NIGHT    nystatin (MYCOSTATIN) 100,000 unit/mL suspension Take 5 mL by mouth four (4) times daily. swish and spit    benzocaine 20 % liqd Instill 4 to 5 drops of otic solution into external ear canal of affected ear(s), repeat every 1 to 2 hours if necessary     No current facility-administered medications for this visit.        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 Fibromyalgia     Generalized osteoarthritis 2018    GERD (gastroesophageal reflux disease)     Hyperlipidemia 2018    Hypertension     IBS (irritable bowel syndrome)     Lower back pain 2018    Osteoporosis 2018    Restless legs        Past Surgical History:   Procedure Laterality Date    HX COLONOSCOPY  2010    HX HYSTERECTOMY      HX OTHER SURGICAL      lumbar disc surgery    HX SALPINGO-OOPHORECTOMY         Social History:  reports that she has never smoked. She has never used smokeless tobacco. She reports that she does not drink alcohol and does not use drugs.     Patient Care Team:  Dillan Knutson MD as PCP - General (Family Medicine)  Dillan Knutson MD as PCP - [facility] Empaneled Provider  Betty Benson MD as Consulting Provider (Gastroenterology)  Mecca Brown MD as Consulting Provider (Neurology)  Saúl Neil MD as Gynecologist (Obstetrics & Gynecology)  Chai Weldon MD as Consulting Provider (Ophthalmology)  Marti Rehman MD as Consulting Provider (Orthopedic Surgery)  Lina Child MD as Consulting Provider (Rheumatology Internal Medicine)  Caleb De La O MD (Dermatology Physician)    Problem List  Date Reviewed: 1/10/2023            Codes Class Noted    Malignant melanoma of face excluding eyelid, nose, lip, and ear [facility]) ICD-10-CM: C43.30  ICD-9-CM: 172.3  1/10/2023        Neuropathy ICD-10-CM: G62.9  ICD-9-CM: 355.9  1/10/2023        Facial neuralgia ICD-10-CM: G51.8  ICD-9-CM: 351.8  1/10/2023        Opioid use, unspecified with unspecified opioid-induced disorder ICD-10-CM: F11.99  ICD-9-CM: 292.9, 305.50  2022        Infraorbital neuralgia ICD-10-CM: G50.0  ICD-9-CM: 350.1  2019        Body mass index (BMI) 20.0-20.9, adult ICD-10-CM: Z68.20  ICD-9-CM: V85.1  2018        HTN (hypertension) ICD-10-CM: I10  ICD-9-CM: 401.9  2018        Facial pain ICD-10-CM: R51.9  ICD-9-CM: 784.0 7/18/2018        Generalized osteoarthritis ICD-10-CM: M15.9  ICD-9-CM: 715.00  7/18/2018        Hyperlipidemia ICD-10-CM: E78.5  ICD-9-CM: 272.4  7/18/2018        Lower back pain ICD-10-CM: M54.50  ICD-9-CM: 724.2  7/18/2018        Osteoporosis ICD-10-CM: M81.0  ICD-9-CM: 733.00  7/18/2018                I ADVISED PATIENT TO GO TO ER IF SYMPTOMS WORSEN , CHANGE OR FAILS TO IMPROVE. I have discussed the diagnosis with the patient and the intended plan as seen in the above orders. The patient has received an after-visit summary and questions were answered concerning future plans. I have discussed medication side effects and warnings with the patient as well. The patient agrees and understands above plan. An electronic signature was used to authenticate this note.   -- Jerome Sutton MD

## 2023-01-10 NOTE — PROGRESS NOTES
Patient stated name &   Chief Complaint   Patient presents with    Medication Refill     Hydrocodone - 3 months refill    Labs     Requesting B12 lab ordered        Health Maintenance Due   Topic    COVID-19 Vaccine (1)    DTaP/Tdap/Td series (1 - Tdap)    Shingles Vaccine (1 of 2)    Flu Vaccine (1)       Wt Readings from Last 3 Encounters:   01/10/23 118 lb 9.6 oz (53.8 kg)   22 115 lb 3.2 oz (52.3 kg)   08/10/22 113 lb (51.3 kg)     Temp Readings from Last 3 Encounters:   01/10/23 97.4 °F (36.3 °C) (Temporal)   22 97.8 °F (36.6 °C) (Temporal)   08/10/22 97.7 °F (36.5 °C) (Temporal)     BP Readings from Last 3 Encounters:   01/10/23 (!) 192/71   22 (!) 186/81   08/10/22 (!) 193/75     Pulse Readings from Last 3 Encounters:   01/10/23 78   22 80   08/10/22 83         Learning Assessment:  :     Learning Assessment 2019   PRIMARY LEARNER Patient   BARRIERS PRIMARY LEARNER NONE   CO-LEARNER CAREGIVER No   PRIMARY LANGUAGE ENGLISH   LEARNER PREFERENCE PRIMARY DEMONSTRATION     READING   ANSWERED BY SELF   RELATIONSHIP SELF       Depression Screening:  :     3 most recent PHQ Screens 1/10/2023   Little interest or pleasure in doing things Not at all   Feeling down, depressed, irritable, or hopeless Not at all   Total Score PHQ 2 0       Fall Risk Assessment:  :     Fall Risk Assessment, last 12 mths 1/10/2023   Able to walk? Yes   Fall in past 12 months? 0   Do you feel unsteady? 0   Are you worried about falling 0       Abuse Screening:  :     Abuse Screening Questionnaire 1/10/2023   Do you ever feel afraid of your partner? N   Are you in a relationship with someone who physically or mentally threatens you? N   Is it safe for you to go home? Y       Coordination of Care Questionnaire:  :   1. \"Have you been to the ER, urgent care clinic since your last visit? Hospitalized since your last visit? \" No    2.  \"Have you seen or consulted any other health care providers outside of the [facility] since your last visit? \" No     3. For patients aged 39-70: Has the patient had a colonoscopy / FIT/ Cologuard? No      If the patient is female:    4. For patients aged 41-77: Has the patient had a mammogram within the past 2 years? No      5. For patients aged 21-65: Has the patient had a pap smear? No     3) Do you have an Advance Directive on file? no  Are you interested in receiving information about Advance Directives? no    Patient is accompanied by self I have received verbal consent from Marcia Mane to discuss any/all medical information while they are present in the room.

## 2023-02-06 ENCOUNTER — VIRTUAL VISIT (OUTPATIENT)
Dept: FAMILY MEDICINE CLINIC | Age: 83
End: 2023-02-06
Payer: MEDICARE

## 2023-02-06 DIAGNOSIS — I10 PRIMARY HYPERTENSION: ICD-10-CM

## 2023-02-06 DIAGNOSIS — C43.30 MALIGNANT MELANOMA OF FACE EXCLUDING EYELID, NOSE, LIP, AND EAR (HCC): ICD-10-CM

## 2023-02-06 DIAGNOSIS — G51.8 FACIAL NEURALGIA: Primary | ICD-10-CM

## 2023-02-06 PROCEDURE — G8432 DEP SCR NOT DOC, RNG: HCPCS | Performed by: FAMILY MEDICINE

## 2023-02-06 PROCEDURE — 99214 OFFICE O/P EST MOD 30 MIN: CPT | Performed by: FAMILY MEDICINE

## 2023-02-06 PROCEDURE — 1090F PRES/ABSN URINE INCON ASSESS: CPT | Performed by: FAMILY MEDICINE

## 2023-02-06 PROCEDURE — G8427 DOCREV CUR MEDS BY ELIG CLIN: HCPCS | Performed by: FAMILY MEDICINE

## 2023-02-06 PROCEDURE — 1123F ACP DISCUSS/DSCN MKR DOCD: CPT | Performed by: FAMILY MEDICINE

## 2023-02-06 PROCEDURE — 1101F PT FALLS ASSESS-DOCD LE1/YR: CPT | Performed by: FAMILY MEDICINE

## 2023-02-06 RX ORDER — HYDROCODONE BITARTRATE AND ACETAMINOPHEN 5; 325 MG/1; MG/1
1 TABLET ORAL
Qty: 45 TABLET | Refills: 0 | Status: SHIPPED | OUTPATIENT
Start: 2023-03-10 | End: 2023-04-09

## 2023-02-06 RX ORDER — HYDROCODONE BITARTRATE AND ACETAMINOPHEN 5; 325 MG/1; MG/1
1 TABLET ORAL
Qty: 45 TABLET | Refills: 0 | Status: SHIPPED | OUTPATIENT
Start: 2023-04-10 | End: 2023-05-10

## 2023-02-06 RX ORDER — HYDROCODONE BITARTRATE AND ACETAMINOPHEN 5; 325 MG/1; MG/1
1 TABLET ORAL
Qty: 45 TABLET | Refills: 0 | Status: SHIPPED | OUTPATIENT
Start: 2023-02-10 | End: 2023-03-12

## 2023-02-06 RX ORDER — AMLODIPINE BESYLATE 2.5 MG/1
2.5 TABLET ORAL
Qty: 90 TABLET | Refills: 1 | Status: SHIPPED | OUTPATIENT
Start: 2023-02-06

## 2023-02-06 NOTE — PROGRESS NOTES
Red George is a 80 y.o. female      Chief Complaint   Patient presents with    Medication Refill    Labs     Labs results     Blood Pressure Check         1. Have you been to the ER, urgent care clinic since your last visit? Hospitalized since your last visit? No       2. Have you seen or consulted any other health care providers outside of the [address] [place] [address] since your last visit? Include any pap smears or colon screening.    No

## 2023-03-07 ENCOUNTER — OFFICE VISIT (OUTPATIENT)
Dept: FAMILY MEDICINE CLINIC | Age: 83
End: 2023-03-07
Payer: MEDICARE

## 2023-03-07 VITALS
HEIGHT: 63 IN | DIASTOLIC BLOOD PRESSURE: 73 MMHG | HEART RATE: 85 BPM | RESPIRATION RATE: 20 BRPM | TEMPERATURE: 97.8 F | OXYGEN SATURATION: 98 % | WEIGHT: 116.8 LBS | SYSTOLIC BLOOD PRESSURE: 153 MMHG | BODY MASS INDEX: 20.7 KG/M2

## 2023-03-07 DIAGNOSIS — R01.1 SYSTOLIC MURMUR: ICD-10-CM

## 2023-03-07 DIAGNOSIS — I10 PRIMARY HYPERTENSION: Primary | ICD-10-CM

## 2023-03-07 PROBLEM — F11.99 OPIOID USE, UNSPECIFIED WITH UNSPECIFIED OPIOID-INDUCED DISORDER (HCC): Status: RESOLVED | Noted: 2022-05-09 | Resolved: 2023-03-07

## 2023-03-07 PROCEDURE — G8536 NO DOC ELDER MAL SCRN: HCPCS | Performed by: FAMILY MEDICINE

## 2023-03-07 PROCEDURE — 99214 OFFICE O/P EST MOD 30 MIN: CPT | Performed by: FAMILY MEDICINE

## 2023-03-07 PROCEDURE — G8427 DOCREV CUR MEDS BY ELIG CLIN: HCPCS | Performed by: FAMILY MEDICINE

## 2023-03-07 PROCEDURE — 1101F PT FALLS ASSESS-DOCD LE1/YR: CPT | Performed by: FAMILY MEDICINE

## 2023-03-07 PROCEDURE — 1123F ACP DISCUSS/DSCN MKR DOCD: CPT | Performed by: FAMILY MEDICINE

## 2023-03-07 PROCEDURE — G8510 SCR DEP NEG, NO PLAN REQD: HCPCS | Performed by: FAMILY MEDICINE

## 2023-03-07 PROCEDURE — 3078F DIAST BP <80 MM HG: CPT | Performed by: FAMILY MEDICINE

## 2023-03-07 PROCEDURE — G8420 CALC BMI NORM PARAMETERS: HCPCS | Performed by: FAMILY MEDICINE

## 2023-03-07 PROCEDURE — 1090F PRES/ABSN URINE INCON ASSESS: CPT | Performed by: FAMILY MEDICINE

## 2023-03-07 PROCEDURE — 3077F SYST BP >= 140 MM HG: CPT | Performed by: FAMILY MEDICINE

## 2023-03-07 NOTE — PROGRESS NOTES
Patient stated name &   Chief Complaint   Patient presents with    Hypertension     Follow up        Health Maintenance Due   Topic    COVID-19 Vaccine (1)    DTaP/Tdap/Td series (1 - Tdap)    Shingles Vaccine (1 of 2)    Flu Vaccine (1)       Wt Readings from Last 3 Encounters:   23 116 lb 12.8 oz (53 kg)   01/10/23 118 lb 9.6 oz (53.8 kg)   22 115 lb 3.2 oz (52.3 kg)     Temp Readings from Last 3 Encounters:   23 97.8 °F (36.6 °C) (Temporal)   01/10/23 97.4 °F (36.3 °C) (Temporal)   22 97.8 °F (36.6 °C) (Temporal)     BP Readings from Last 3 Encounters:   23 (!) 178/73   01/10/23 (!) 196/71   22 (!) 186/81     Pulse Readings from Last 3 Encounters:   23 85   01/10/23 78   22 80         Learning Assessment:  :     Learning Assessment 2019   PRIMARY LEARNER Patient   BARRIERS PRIMARY LEARNER NONE   CO-LEARNER CAREGIVER No   PRIMARY LANGUAGE ENGLISH   LEARNER PREFERENCE PRIMARY DEMONSTRATION     READING   ANSWERED BY SELF   RELATIONSHIP SELF       Depression Screening:  :     3 most recent PHQ Screens 3/7/2023   Little interest or pleasure in doing things Not at all   Feeling down, depressed, irritable, or hopeless Not at all   Total Score PHQ 2 0       Fall Risk Assessment:  :     Fall Risk Assessment, last 12 mths 3/7/2023   Able to walk? Yes   Fall in past 12 months? 0   Do you feel unsteady? 0   Are you worried about falling 0       Abuse Screening:  :     Abuse Screening Questionnaire 3/7/2023 1/10/2023   Do you ever feel afraid of your partner? N N   Are you in a relationship with someone who physically or mentally threatens you? N N   Is it safe for you to go home? Y Y       Coordination of Care Questionnaire:  :   1. \"Have you been to the ER, urgent care clinic since your last visit? Hospitalized since your last visit? \" No    2. \"Have you seen or consulted any other health care providers outside of the [address] [place] [address] since your last visit? \" No     3. For patients aged 39-70: Has the patient had a colonoscopy / FIT/ Cologuard? No      If the patient is female:    4. For patients aged 41-77: Has the patient had a mammogram within the past 2 years? No      5. For patients aged 21-65: Has the patient had a pap smear? No     3) Do you have an Advance Directive on file? no  Are you interested in receiving information about Advance Directives? no    Patient is accompanied by self I have received verbal consent from Tiffanie Sykes to discuss any/all medical information while they are present in the room.

## 2023-03-21 DIAGNOSIS — G25.81 RESTLESS LEGS SYNDROME: ICD-10-CM

## 2023-03-22 RX ORDER — PRAMIPEXOLE DIHYDROCHLORIDE 0.12 MG/1
TABLET ORAL
Qty: 180 TABLET | Refills: 1 | Status: SHIPPED | OUTPATIENT
Start: 2023-03-22

## 2023-05-07 RX ORDER — HYDROCODONE BITARTRATE AND ACETAMINOPHEN 5; 325 MG/1; MG/1
1 TABLET ORAL EVERY 8 HOURS PRN
COMMUNITY
Start: 2023-04-10 | End: 2023-05-08 | Stop reason: SDUPTHER

## 2023-05-08 ENCOUNTER — CLINICAL DOCUMENTATION (OUTPATIENT)
Facility: CLINIC | Age: 83
End: 2023-05-08

## 2023-05-08 ENCOUNTER — OFFICE VISIT (OUTPATIENT)
Facility: CLINIC | Age: 83
End: 2023-05-08
Payer: MEDICARE

## 2023-05-08 VITALS
WEIGHT: 119.6 LBS | HEART RATE: 74 BPM | OXYGEN SATURATION: 99 % | DIASTOLIC BLOOD PRESSURE: 68 MMHG | TEMPERATURE: 97.9 F | HEIGHT: 63 IN | BODY MASS INDEX: 21.19 KG/M2 | RESPIRATION RATE: 20 BRPM | SYSTOLIC BLOOD PRESSURE: 174 MMHG

## 2023-05-08 DIAGNOSIS — G89.29 OTHER CHRONIC PAIN: Primary | ICD-10-CM

## 2023-05-08 DIAGNOSIS — R00.2 PALPITATIONS: ICD-10-CM

## 2023-05-08 DIAGNOSIS — I10 ESSENTIAL (PRIMARY) HYPERTENSION: ICD-10-CM

## 2023-05-08 PROCEDURE — G8420 CALC BMI NORM PARAMETERS: HCPCS | Performed by: FAMILY MEDICINE

## 2023-05-08 PROCEDURE — G8428 CUR MEDS NOT DOCUMENT: HCPCS | Performed by: FAMILY MEDICINE

## 2023-05-08 PROCEDURE — 99214 OFFICE O/P EST MOD 30 MIN: CPT | Performed by: FAMILY MEDICINE

## 2023-05-08 PROCEDURE — G8400 PT W/DXA NO RESULTS DOC: HCPCS | Performed by: FAMILY MEDICINE

## 2023-05-08 PROCEDURE — 1090F PRES/ABSN URINE INCON ASSESS: CPT | Performed by: FAMILY MEDICINE

## 2023-05-08 PROCEDURE — 4004F PT TOBACCO SCREEN RCVD TLK: CPT | Performed by: FAMILY MEDICINE

## 2023-05-08 PROCEDURE — 3078F DIAST BP <80 MM HG: CPT | Performed by: FAMILY MEDICINE

## 2023-05-08 PROCEDURE — 1123F ACP DISCUSS/DSCN MKR DOCD: CPT | Performed by: FAMILY MEDICINE

## 2023-05-08 PROCEDURE — 3077F SYST BP >= 140 MM HG: CPT | Performed by: FAMILY MEDICINE

## 2023-05-08 RX ORDER — HYDROCODONE BITARTRATE AND ACETAMINOPHEN 5; 325 MG/1; MG/1
1 TABLET ORAL EVERY 8 HOURS PRN
Qty: 45 TABLET | Refills: 0 | Status: SHIPPED | OUTPATIENT
Start: 2023-05-10 | End: 2023-06-09

## 2023-05-08 RX ORDER — HYDROCODONE BITARTRATE AND ACETAMINOPHEN 5; 325 MG/1; MG/1
1 TABLET ORAL EVERY 8 HOURS PRN
Qty: 45 TABLET | Refills: 0 | Status: SHIPPED | OUTPATIENT
Start: 2023-07-10 | End: 2023-08-09

## 2023-05-08 RX ORDER — HYDROCODONE BITARTRATE AND ACETAMINOPHEN 5; 325 MG/1; MG/1
1 TABLET ORAL EVERY 8 HOURS PRN
Qty: 45 TABLET | Refills: 0 | Status: SHIPPED | OUTPATIENT
Start: 2023-06-10 | End: 2023-07-10

## 2023-05-08 SDOH — ECONOMIC STABILITY: HOUSING INSECURITY
IN THE LAST 12 MONTHS, WAS THERE A TIME WHEN YOU DID NOT HAVE A STEADY PLACE TO SLEEP OR SLEPT IN A SHELTER (INCLUDING NOW)?: NO

## 2023-05-08 SDOH — ECONOMIC STABILITY: FOOD INSECURITY: WITHIN THE PAST 12 MONTHS, YOU WORRIED THAT YOUR FOOD WOULD RUN OUT BEFORE YOU GOT MONEY TO BUY MORE.: NEVER TRUE

## 2023-05-08 SDOH — ECONOMIC STABILITY: FOOD INSECURITY: WITHIN THE PAST 12 MONTHS, THE FOOD YOU BOUGHT JUST DIDN'T LAST AND YOU DIDN'T HAVE MONEY TO GET MORE.: NEVER TRUE

## 2023-05-08 SDOH — ECONOMIC STABILITY: INCOME INSECURITY: HOW HARD IS IT FOR YOU TO PAY FOR THE VERY BASICS LIKE FOOD, HOUSING, MEDICAL CARE, AND HEATING?: NOT VERY HARD

## 2023-05-08 ASSESSMENT — PATIENT HEALTH QUESTIONNAIRE - PHQ9
1. LITTLE INTEREST OR PLEASURE IN DOING THINGS: 0
2. FEELING DOWN, DEPRESSED OR HOPELESS: 0
SUM OF ALL RESPONSES TO PHQ9 QUESTIONS 1 & 2: 0
SUM OF ALL RESPONSES TO PHQ QUESTIONS 1-9: 0

## 2023-05-08 ASSESSMENT — ANXIETY QUESTIONNAIRES
3. WORRYING TOO MUCH ABOUT DIFFERENT THINGS: 0
2. NOT BEING ABLE TO STOP OR CONTROL WORRYING: 0
6. BECOMING EASILY ANNOYED OR IRRITABLE: 0
7. FEELING AFRAID AS IF SOMETHING AWFUL MIGHT HAPPEN: 0
GAD7 TOTAL SCORE: 0
4. TROUBLE RELAXING: 0
5. BEING SO RESTLESS THAT IT IS HARD TO SIT STILL: 0
1. FEELING NERVOUS, ANXIOUS, OR ON EDGE: 0

## 2023-05-08 ASSESSMENT — ENCOUNTER SYMPTOMS
SHORTNESS OF BREATH: 0
DIARRHEA: 0
CHEST TIGHTNESS: 0

## 2023-05-08 NOTE — PROGRESS NOTES
Kevin Seen  80 y.o. female  1940  [id number]  [facility] MEDICINE  Progress Note     Encounter Date: 5/8/2023    Assessment and Plan:   1. Other chronic pain  Chronic facial pain after orbital fracture and infraorbital nerve entrapment  Right face   OK  No other pain meds or controlled substances  No evidence of diversion or misuse  Pain agreement updated  Drug screen today  -     HYDROcodone-acetaminophen (NORCO) 5-325 MG per tablet; Take 1 tablet by mouth every 8 hours as needed for Pain for up to 30 days. Max Daily Amount: 3 tablets, Disp-45 tablet, R-0Normal  -     HYDROcodone-acetaminophen (NORCO) 5-325 MG per tablet; Take 1 tablet by mouth every 8 hours as needed for Pain for up to 30 days. Max Daily Amount: 3 tablets, Disp-45 tablet, R-0Normal  -     HYDROcodone-acetaminophen (NORCO) 5-325 MG per tablet; Take 1 tablet by mouth every 8 hours as needed for Pain for up to 30 days. Max Daily Amount: 3 tablets, Disp-45 tablet, R-0Normal  -     10-Drug Screen w/Conf; Future  2. Essential (primary) hypertension  Variable BP without explanation  Very elevated in office with episodes of low BP and weakness at home  To cardiology for evaluation for other causes of hypotension and explanation of discordant BP between here and home.  -     Noemi Paz MD, Cardiology, [place]  3. Palpitations-possible cause of hypotension  -     [facility] - Cherise Marquez MD, Cardiology, [place]       I have discussed the diagnosis with the patient and the intended plan as seen in the above orders. she has expressed understanding. The patient has received an after-visit summary and questions were answered concerning future plans. I have discussed medication side effects and warnings with the patient as well.     Electronically Signed: Louise Deras MD    Current Medications after this visit     Current Outpatient Medications   Medication Sig    [START ON 5/10/2023] HYDROcodone-acetaminophen

## 2023-05-08 NOTE — PROGRESS NOTES
dentified pt with two pt identifiers(name and ). Chief Complaint   Patient presents with    Other     Echo follow up    Hypertension     Follow up    Follow-up     Protein in urine        Health Maintenance Due   Topic    COVID-19 Vaccine (1)    DTaP/Tdap/Td vaccine (1 - Tdap)    Shingles vaccine (1 of 2)    DEXA (modify frequency per FRAX score)        Wt Readings from Last 3 Encounters:   23 119 lb 9.6 oz (54.3 kg)   23 116 lb 12.8 oz (53 kg)   01/10/23 118 lb 9.6 oz (53.8 kg)     Temp Readings from Last 3 Encounters:   23 97.9 °F (36.6 °C) (Temporal)     BP Readings from Last 3 Encounters:   23 (!) 185/67   23 (!) 153/73   01/10/23 (!) 196/71     Pulse Readings from Last 3 Encounters:   23 74   23 85   01/10/23 78           Coordination of Care Questionnaire:  :   1. \"Have you been to the ER, urgent care clinic since your last visit? Hospitalized since your last visit? \" No    2. \"Have you seen or consulted any other health care providers outside of the [address] [place] [address] since your last visit? \" No     3. For patients aged 39-70: Has the patient had a colonoscopy / FIT/ Cologuard? No      If the patient is female:    4. For patients aged 41-77: Has the patient had a mammogram within the past 2 years? No      5. For patients aged 21-65: Has the patient had a pap smear? No    3) Do you have an Advance Directive on file? No  Are you interested in receiving information about Advance Directives? No    Patient is accompanied by self I have received verbal consent from Fabricio Stein to discuss any/all medical information while they are present in the room.

## 2023-05-08 NOTE — PROGRESS NOTES
Patient has a appt to see NP [facility] 5/15/23 @ 9:00 arrive 8:30  patient was notified of this appt

## 2023-05-11 LAB
AMPHETAMINES UR QL SCN: NEGATIVE NG/ML
BARBITURATES UR QL SCN: NEGATIVE NG/ML
BENZODIAZ UR QL SCN: NEGATIVE NG/ML
BZE UR QL SCN: NEGATIVE NG/ML
CANNABINOIDS UR QL SCN: NEGATIVE NG/ML
CREAT UR-MCNC: 27.9 MG/DL (ref 20–300)
LABORATORY COMMENT REPORT: NORMAL
METHADONE UR QL SCN: NEGATIVE NG/ML
OPIATES UR QL SCN: NEGATIVE NG/ML
OXYCODONE+OXYMORPHONE UR QL SCN: NEGATIVE NG/ML
PCP UR QL: NEGATIVE NG/ML
PH UR: 5.6 (ref 4.5–8.9)
PROPOXYPH UR QL SCN: NEGATIVE NG/ML

## 2023-06-21 ENCOUNTER — TELEPHONE (OUTPATIENT)
Facility: CLINIC | Age: 83
End: 2023-06-21

## 2023-06-21 NOTE — TELEPHONE ENCOUNTER
Pt states that Dr Yunior Boone will be getting some information from Dr Eli Berumen regarding a surgery they are recommending about a Pace maker. [phone] is the number to call her back on.

## 2023-07-05 ENCOUNTER — TELEPHONE (OUTPATIENT)
Facility: CLINIC | Age: 83
End: 2023-07-05

## 2023-07-05 NOTE — TELEPHONE ENCOUNTER
Pt would like a call from [address] [place] [address]    No information was given other than its not an medical question.     Please advise

## 2023-07-06 ENCOUNTER — OFFICE VISIT (OUTPATIENT)
Facility: CLINIC | Age: 83
End: 2023-07-06

## 2023-07-06 VITALS
HEART RATE: 79 BPM | OXYGEN SATURATION: 96 % | DIASTOLIC BLOOD PRESSURE: 76 MMHG | HEIGHT: 63 IN | TEMPERATURE: 98.4 F | WEIGHT: 118.4 LBS | SYSTOLIC BLOOD PRESSURE: 189 MMHG | RESPIRATION RATE: 16 BRPM | BODY MASS INDEX: 20.98 KG/M2

## 2023-07-06 DIAGNOSIS — I44.1 HEART BLOCK AV SECOND DEGREE: Primary | ICD-10-CM

## 2023-07-06 ASSESSMENT — ENCOUNTER SYMPTOMS: SHORTNESS OF BREATH: 0

## 2023-07-06 NOTE — PROGRESS NOTES
1. Have you been to the ER, urgent care clinic since your last visit? Hospitalized since your last visit? No    2. Have you seen or consulted any other health care providers outside of the [address] [place] [address] Avenue since your last visit? Include any pap smears or colon screening.  No        Chief Complaint   Patient presents with    Other     Discuss pacemaker
(comments)

## 2023-07-13 ENCOUNTER — TELEPHONE (OUTPATIENT)
Facility: CLINIC | Age: 83
End: 2023-07-13

## 2023-07-13 NOTE — TELEPHONE ENCOUNTER
BP was 197/83 this afternoon. Patient went to [facility] on Monday then then to [facility] for a CT scan of the head. She states headaches have gotten worst. Has an appointment to get a pacemaker on 08/01/23. Patient would like an earlier appointment if possible. I did inform patient if headaches become worse to try and go to an urgent care. Please advise CHANDAN [phone].

## 2023-07-19 ENCOUNTER — OFFICE VISIT (OUTPATIENT)
Facility: CLINIC | Age: 83
End: 2023-07-19
Payer: MEDICARE

## 2023-07-19 VITALS
HEART RATE: 80 BPM | SYSTOLIC BLOOD PRESSURE: 177 MMHG | WEIGHT: 117 LBS | BODY MASS INDEX: 20.73 KG/M2 | DIASTOLIC BLOOD PRESSURE: 66 MMHG | HEIGHT: 63 IN | RESPIRATION RATE: 20 BRPM | TEMPERATURE: 98.1 F | OXYGEN SATURATION: 96 %

## 2023-07-19 DIAGNOSIS — Z09 HOSPITAL DISCHARGE FOLLOW-UP: Primary | ICD-10-CM

## 2023-07-19 DIAGNOSIS — I44.1 HEART BLOCK AV SECOND DEGREE: ICD-10-CM

## 2023-07-19 DIAGNOSIS — G89.29 OTHER CHRONIC PAIN: ICD-10-CM

## 2023-07-19 DIAGNOSIS — I10 ESSENTIAL (PRIMARY) HYPERTENSION: ICD-10-CM

## 2023-07-19 PROCEDURE — 99215 OFFICE O/P EST HI 40 MIN: CPT | Performed by: FAMILY MEDICINE

## 2023-07-19 PROCEDURE — 4004F PT TOBACCO SCREEN RCVD TLK: CPT | Performed by: FAMILY MEDICINE

## 2023-07-19 PROCEDURE — G8428 CUR MEDS NOT DOCUMENT: HCPCS | Performed by: FAMILY MEDICINE

## 2023-07-19 PROCEDURE — 1123F ACP DISCUSS/DSCN MKR DOCD: CPT | Performed by: FAMILY MEDICINE

## 2023-07-19 PROCEDURE — 3078F DIAST BP <80 MM HG: CPT | Performed by: FAMILY MEDICINE

## 2023-07-19 PROCEDURE — G8400 PT W/DXA NO RESULTS DOC: HCPCS | Performed by: FAMILY MEDICINE

## 2023-07-19 PROCEDURE — 1090F PRES/ABSN URINE INCON ASSESS: CPT | Performed by: FAMILY MEDICINE

## 2023-07-19 PROCEDURE — 3077F SYST BP >= 140 MM HG: CPT | Performed by: FAMILY MEDICINE

## 2023-07-19 PROCEDURE — G8420 CALC BMI NORM PARAMETERS: HCPCS | Performed by: FAMILY MEDICINE

## 2023-07-19 RX ORDER — HYDROCODONE BITARTRATE AND ACETAMINOPHEN 5; 325 MG/1; MG/1
1 TABLET ORAL EVERY 8 HOURS PRN
Qty: 45 TABLET | Refills: 0 | Status: SHIPPED | OUTPATIENT
Start: 2023-09-09 | End: 2023-10-09

## 2023-07-19 RX ORDER — HYDROCODONE BITARTRATE AND ACETAMINOPHEN 5; 325 MG/1; MG/1
1 TABLET ORAL EVERY 8 HOURS PRN
Qty: 45 TABLET | Refills: 0 | Status: SHIPPED | OUTPATIENT
Start: 2023-08-09 | End: 2023-09-08

## 2023-07-19 RX ORDER — HYDROCODONE BITARTRATE AND ACETAMINOPHEN 5; 325 MG/1; MG/1
1 TABLET ORAL EVERY 8 HOURS PRN
Qty: 30 TABLET | Refills: 0 | Status: SHIPPED | OUTPATIENT
Start: 2023-10-09 | End: 2023-11-08

## 2023-07-19 RX ORDER — CLONIDINE HYDROCHLORIDE 0.1 MG/1
TABLET ORAL
COMMUNITY
Start: 2023-07-15

## 2023-07-19 ASSESSMENT — ENCOUNTER SYMPTOMS
BLOOD IN STOOL: 0
SHORTNESS OF BREATH: 0

## 2023-07-19 NOTE — PROGRESS NOTES
Renato Ham  80 y.o. female  1940  [id number]  [facility] MEDICINE  Progress Note     Encounter Date: 7/19/2023    Assessment and Plan:       ICD-10-CM    1. Hospital discharge follow-up  Z09       2. Essential (primary) hypertension  I10       3. Heart block AV second degree  I44.1       4. Other chronic pain  G89.29 HYDROcodone-acetaminophen (NORCO) 5-325 MG per tablet     HYDROcodone-acetaminophen (NORCO) 5-325 MG per tablet     HYDROcodone-acetaminophen (NORCO) 5-325 MG per tablet        1. Hospital discharge follow-up  DC summary reviewed  Meds reconciled  2. Essential (primary) hypertension  Variable BP. Headache improved when she had BP treated but now still has low BP's at home and dizziness, near syncope  She should continue to treat the BP as she has been:  Take lisinopril BID consistently  Take Amlodipine daily but hold if BP under 120  Take clonidine BID PRN for BP over 180  Reassess after she gets pacemaker  May need eval for JAY, pheo, hyperaldo  3. Heart block AV second degree  Seeing Cardiology  Getting pacemaker August 1st.  She will also get their take on her BP.  4. Other chronic pain for facial pain after orbital fracture and inf alveolar nerve entrapment. Refilled next 3 months of pain meds for August, September and October   looks OK.  -     HYDROcodone-acetaminophen ([address]) 5-325 MG per tablet; Take 1 tablet by mouth every 8 hours as needed for Pain for up to 30 days. Max Daily Amount: 3 tablets, Disp-45 tablet, R-0Normal  -     HYDROcodone-acetaminophen (NORCO) 5-325 MG per tablet; Take 1 tablet by mouth every 8 hours as needed for Pain for up to 30 days. Take lowest dose possible to manage pain Max Daily Amount: 3 tablets, Disp-45 tablet, R-0Normal  -     HYDROcodone-acetaminophen (NORCO) 5-325 MG per tablet; Take 1 tablet by mouth every 8 hours as needed for Pain for up to 30 days.  Take lowest dose possible to manage pain Max Daily Amount: 3 tablets,

## 2023-07-23 NOTE — PROGRESS NOTES
Vic Buck  80 y.o. female  1940  [id number]  [facility] MEDICINE  Progress Note     Encounter Date: 3/7/2023    Assessment and Plan:     Encounter Diagnoses     ICD-10-CM ICD-9-CM   1. Primary hypertension  I10 401.9   2. Systolic murmur  Q24.3 858.2       1. Primary hypertension  Adequate control by home bp's   I worry about repeat episodes of syncope if she has nausea or diarrhea. When those episodes occur, she should hold amlodipine and see us  - ECHO ADULT COMPLETE; Future    2. Systolic murmur  Check ECHO. Should also help us assess the adequacy of her BP control. I have discussed the diagnosis with the patient and the intended plan as seen in the above orders. she has expressed understanding. The patient has received an after-visit summary and questions were answered concerning future plans. I have discussed medication side effects and warnings with the patient as well. Electronically Signed: Qian Rebolledo MD    Current Medications after this visit     Current Outpatient Medications   Medication Sig    amLODIPine (NORVASC) 2.5 mg tablet Take 1 Tablet by mouth nightly. HYDROcodone-acetaminophen (NORCO) 5-325 mg per tablet Take 1 Tablet by mouth every eight (8) hours as needed for Pain for up to 30 days. Max Daily Amount: 3 Tablets. [START ON 3/10/2023] HYDROcodone-acetaminophen (NORCO) 5-325 mg per tablet Take 1 Tablet by mouth every eight (8) hours as needed for Pain for up to 30 days. Max Daily Amount: 3 Tablets. [START ON 4/10/2023] HYDROcodone-acetaminophen (NORCO) 5-325 mg per tablet Take 1 Tablet by mouth every eight (8) hours as needed for Pain for up to 30 days. Max Daily Amount: 3 Tablets.     omeprazole (PRILOSEC) 20 mg capsule TAKE 1 CAPSULE EVERY DAY    fluticasone propionate (FLONASE) 50 mcg/actuation nasal spray USE 1 SPRAY IN EACH NOSTRIL EVERY DAY    diphenoxylate-atropine (LOMOTIL) 2.5-0.025 mg per tablet TAKE 1 TABLET FOUR TIMES DAILY AS NEEDED FOR DIARRHEA (MAX DAILY AMOUNT: 4 TABLETS)    lisinopriL (PRINIVIL, ZESTRIL) 10 mg tablet Take 1 Tablet by mouth two (2) times a day.    gabapentin (NEURONTIN) 100 mg capsule One by mouth in AM and two by mouth at bedtime. meloxicam (MOBIC) 15 mg tablet TAKE 1 TABLET EVERY DAY    pramipexole (MIRAPEX) 0.125 mg tablet TAKE 2 TABLETS EVERY NIGHT    nystatin (MYCOSTATIN) 100,000 unit/mL suspension Take 5 mL by mouth four (4) times daily. swish and spit    benzocaine 20 % liqd Instill 4 to 5 drops of otic solution into external ear canal of affected ear(s), repeat every 1 to 2 hours if necessary     No current facility-administered medications for this visit. There are no discontinued medications. ~~~~~~~~~~~~~~~~~~~~~~~~~~~~~~~~~~~~~~~~~~~~~~~~~~~~~~~~~~~    Chief Complaint   Patient presents with    Hypertension     Follow up       History provided by patient  History of Present Illness   Matt Denton is a 80 y.o. female who presents to clinic today for:  Hypertension (Follow up)    Facial pain persists if hit by wind or sun hits it. Incision from melanoma gradually improving. Hypertension  AT home:  High 176 systolic  Low 357/88  Today by her cuff 161/68  Only two of last 9 home BP's not at goal.  Our BP in same general range  No dizziness  Sometimes feels weak    Still gets diarrhea sometimes still    Son has bicuspid aortic valve. Health Maintenance  Will do at future visit  Health Maintenance Due   Topic Date Due    COVID-19 Vaccine (1) Never done    DTaP/Tdap/Td series (1 - Tdap) Never done    Shingles Vaccine (1 of 2) Never done    Flu Vaccine (1) Never done     Review of Systems   Review of Systems   Respiratory:  Negative for shortness of breath. Cardiovascular:  Negative for chest pain. Gastrointestinal:  Positive for diarrhea. Genitourinary:  Negative for hematuria. Psychiatric/Behavioral: Negative.         Vitals/Objective:     Vitals:    03/07/23 0911 03/07/23 5825 03/07/23 0921   BP: (!) 178/73 (!) 178/72 (!) 153/73   Pulse: 85     Resp: 20     Temp: 97.8 °F (36.6 °C)     TempSrc: Temporal     SpO2: 98%     Weight: 116 lb 12.8 oz (53 kg)     Height: 5' 3\" (1.6 m)       Body mass index is 20.69 kg/m². Wt Readings from Last 3 Encounters:   03/07/23 116 lb 12.8 oz (53 kg)   01/10/23 118 lb 9.6 oz (53.8 kg)   11/08/22 115 lb 3.2 oz (52.3 kg)         Objective  Physical Exam  Vitals and nursing note reviewed. Constitutional:       Appearance: Normal appearance. She is not toxic-appearing. HENT:      Head: Normocephalic and atraumatic. Comments: Incision right lower cheek along mandible healing well. Cardiovascular:      Rate and Rhythm: Normal rate and regular rhythm. Heart sounds: Murmur heard. Systolic murmur is present with a grade of 2/6. No gallop. Pulmonary:      Effort: Pulmonary effort is normal. No respiratory distress. Breath sounds: Normal breath sounds. No wheezing, rhonchi or rales. Musculoskeletal:      Cervical back: No muscular tenderness. Right lower leg: No edema. Left lower leg: No edema. Lymphadenopathy:      Cervical: No cervical adenopathy. Neurological:      Mental Status: She is alert. Psychiatric:         Mood and Affect: Mood normal.         Behavior: Behavior normal.         Thought Content: Thought content normal.         Judgment: Judgment normal.         No results found for this or any previous visit (from the past 24 hour(s)). Disposition     Follow-up and Dispositions    Return in about 3 months (around 6/7/2023) for Medication follow up, Blood pressure follow up. Future Appointments   Date Time Provider Leonides Marylou   6/7/2023  9:20 AM Renetta Gonzalez MD [facility] AM[facility]       History   Patient's past medical, surgical and family histories were reviewed and updated.     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 Fibromyalgia     Generalized osteoarthritis 07/18/2018    GERD (gastroesophageal reflux disease)     Hyperlipidemia 07/18/2018    Hypertension     IBS (irritable bowel syndrome)     Lower back pain 07/18/2018    Melanoma ([address] 2023    right lower cheek.     Osteoporosis 07/18/2018    Restless legs      Past Surgical History:   Procedure Laterality Date    HX COLONOSCOPY  01/2010    HX HYSTERECTOMY      HX OTHER SURGICAL      lumbar disc surgery    HX SALPINGO-OOPHORECTOMY       Family History   Problem Relation Age of Onset    Stroke Mother     Heart Attack Father     Hypertension Brother     Inflammatory Bowel Dz Brother      Social History     Tobacco Use    Smoking status: Never    Smokeless tobacco: Never   Vaping Use    Vaping Use: Never used   Substance Use Topics    Alcohol use: No    Drug use: No       Allergies     Allergies   Allergen Reactions    Alendronate Unknown (comments)    Doxycycline Unknown (comments)    Erythromycin Unknown (comments)    Nsaids (Non-Steroidal Anti-Inflammatory Drug) Unknown (comments)    Pcn [Penicillins] Unknown (comments) 24-Jul-2023 04:20

## 2023-08-01 RX ORDER — OMEPRAZOLE 20 MG/1
CAPSULE, DELAYED RELEASE ORAL
Qty: 90 CAPSULE | Refills: 1 | Status: SHIPPED | OUTPATIENT
Start: 2023-08-01

## 2023-08-01 RX ORDER — LISINOPRIL 10 MG/1
TABLET ORAL
Qty: 180 TABLET | Refills: 1 | Status: SHIPPED | OUTPATIENT
Start: 2023-08-01

## 2023-08-01 NOTE — TELEPHONE ENCOUNTER
PCP: Dulce Srivastava MD    Last appt: [unfilled]  Future Appointments   Date Time Provider [address]   8/22/2023 11:00 AM Dulce Srivastava MD [facility]       Requested Prescriptions     Pending Prescriptions Disp Refills    lisinopril (PRINIVIL;ZESTRIL) 10 MG tablet [Pharmacy Med Name: LISINOPRIL 10 MG Tablet] 180 tablet      Sig: TAKE 1 TABLET TWICE DAILY       Prior labs and Blood pressures:  BP Readings from Last 3 Encounters:   07/19/23 (!) 177/66   07/06/23 (!) 189/76   05/08/23 (!) 174/68     Lab Results   Component Value Date/Time     01/12/2023 08:11 AM    K 4.2 01/12/2023 08:11 AM     01/12/2023 08:11 AM    CO2 29 01/12/2023 08:11 AM    BUN 19 01/12/2023 08:11 AM    GFRAA >60 04/28/2021 11:28 AM     No results found for: HBA1C, [id number]  Lab Results   Component Value Date/Time    CHOL 235 08/10/2022 09:31 AM    HDL 85 08/10/2022 09:31 AM     No results found for: VITD3, VD3RIA    Lab Results   Component Value Date/Time    TSH 2.63 04/28/2021 11:28 AM

## 2023-08-01 NOTE — TELEPHONE ENCOUNTER
PCP: Chris Lau MD    Last appt: [unfilled]  Future Appointments   Date Time Provider [address]   8/22/2023 11:00 AM Chris Lau MD [facility] AMB       Requested Prescriptions     Pending Prescriptions Disp Refills    omeprazole (PRILOSEC) 20 MG delayed release capsule [Pharmacy Med Name: OMEPRAZOLE 20 MG Capsule Delayed Release] 90 capsule      Sig: TAKE 1 CAPSULE EVERY DAY       Prior labs and Blood pressures:  BP Readings from Last 3 Encounters:   07/19/23 (!) 177/66   07/06/23 (!) 189/76   05/08/23 (!) 174/68     Lab Results   Component Value Date/Time     01/12/2023 08:11 AM    K 4.2 01/12/2023 08:11 AM     01/12/2023 08:11 AM    CO2 29 01/12/2023 08:11 AM    BUN 19 01/12/2023 08:11 AM    GFRAA >60 04/28/2021 11:28 AM     No results found for: HBA1C, [id number]  Lab Results   Component Value Date/Time    CHOL 235 08/10/2022 09:31 AM    HDL 85 08/10/2022 09:31 AM     No results found for: VITD3, VD3RIA    Lab Results   Component Value Date/Time    TSH 2.63 04/28/2021 11:28 AM

## 2023-08-21 ENCOUNTER — TELEPHONE (OUTPATIENT)
Facility: CLINIC | Age: 83
End: 2023-08-21

## 2023-10-31 NOTE — TELEPHONE ENCOUNTER
PCP: Sulma Rivera MD    Last appt: [unfilled]  Patient was last seen on 07/19/2023  Future Appointments   Date Time Provider [address]   11/15/2023 10:00 AM Sulma Rivera MD [facility] AMB       Requested Prescriptions     Pending Prescriptions Disp Refills    pramipexole (MIRAPEX) 0.125 MG tablet [Pharmacy Med Name: PRAMIPEXOLE DIHYDROCHLORIDE 0.125 MG Tablet] 180 tablet 10     Sig: TAKE 2 TABLETS EVERY NIGHT       Prior labs and Blood pressures:  BP Readings from Last 3 Encounters:   07/19/23 (!) 177/66   07/06/23 (!) 189/76   05/08/23 (!) 174/68     Lab Results   Component Value Date/Time     01/12/2023 08:11 AM    K 4.2 01/12/2023 08:11 AM     01/12/2023 08:11 AM    CO2 29 01/12/2023 08:11 AM    BUN 19 01/12/2023 08:11 AM    GFRAA >60 04/28/2021 11:28 AM     No results found for: \"HBA1C\", \"[id number]\"  Lab Results   Component Value Date/Time    CHOL 235 08/10/2022 09:31 AM    HDL 85 08/10/2022 09:31 AM     No results found for: \"VITD3\", \"VD3RIA\"    Lab Results   Component Value Date/Time    TSH 2.63 04/28/2021 11:28 AM

## 2023-11-01 RX ORDER — PRAMIPEXOLE DIHYDROCHLORIDE 0.12 MG/1
TABLET ORAL
Qty: 180 TABLET | Refills: 1 | Status: SHIPPED | OUTPATIENT
Start: 2023-11-01

## 2023-11-07 NOTE — TELEPHONE ENCOUNTER
PCP: Brock Gilliland MD    Last appt: [unfilled]  Future Appointments   Date Time Provider Department Center   11/8/2023 10:40 AM Brock Gilliland MD [facility] AMB       Requested Prescriptions     Pending Prescriptions Disp Refills    amLODIPine (NORVASC) 2.5 MG tablet [Pharmacy Med Name: AMLODIPINE BESYLATE 2.5 MG Tablet] 90 tablet 10     Sig: TAKE 1 TABLET EVERY NIGHT       Prior labs and Blood pressures:  BP Readings from Last 3 Encounters:   07/19/23 (!) 177/66   07/06/23 (!) 189/76   05/08/23 (!) 174/68     Lab Results   Component Value Date/Time     01/12/2023 08:11 AM    K 4.2 01/12/2023 08:11 AM     01/12/2023 08:11 AM    CO2 29 01/12/2023 08:11 AM    BUN 19 01/12/2023 08:11 AM    GFRAA >60 04/28/2021 11:28 AM     No results found for: \"HBA1C\", \"[id number]\"  Lab Results   Component Value Date/Time    CHOL 235 08/10/2022 09:31 AM    HDL 85 08/10/2022 09:31 AM     No results found for: \"VITD3\", \"VD3RIA\"    Lab Results   Component Value Date/Time    TSH 2.63 04/28/2021 11:28 AM

## 2023-11-08 ENCOUNTER — HOSPITAL ENCOUNTER (OUTPATIENT)
Facility: HOSPITAL | Age: 83
Discharge: HOME | End: 2023-11-11
Payer: MEDICARE

## 2023-11-08 ENCOUNTER — TRANSCRIBE ORDERS (OUTPATIENT)
Facility: HOSPITAL | Age: 83
End: 2023-11-08

## 2023-11-08 ENCOUNTER — OFFICE VISIT (OUTPATIENT)
Facility: CLINIC | Age: 83
End: 2023-11-08
Payer: MEDICARE

## 2023-11-08 VITALS
HEIGHT: 63 IN | BODY MASS INDEX: 20.98 KG/M2 | DIASTOLIC BLOOD PRESSURE: 73 MMHG | WEIGHT: 118.4 LBS | SYSTOLIC BLOOD PRESSURE: 171 MMHG | RESPIRATION RATE: 18 BRPM | OXYGEN SATURATION: 97 % | HEART RATE: 76 BPM | TEMPERATURE: 99.1 F

## 2023-11-08 DIAGNOSIS — Z09 HOSPITAL DISCHARGE FOLLOW-UP: Primary | ICD-10-CM

## 2023-11-08 DIAGNOSIS — K59.1 FUNCTIONAL DIARRHEA: Primary | ICD-10-CM

## 2023-11-08 DIAGNOSIS — I10 ESSENTIAL (PRIMARY) HYPERTENSION: ICD-10-CM

## 2023-11-08 DIAGNOSIS — G89.29 OTHER CHRONIC PAIN: ICD-10-CM

## 2023-11-08 DIAGNOSIS — J93.9 PNEUMOTHORAX, UNSPECIFIED TYPE: ICD-10-CM

## 2023-11-08 DIAGNOSIS — I44.1 HEART BLOCK AV SECOND DEGREE: ICD-10-CM

## 2023-11-08 DIAGNOSIS — J93.9 PNEUMOTHORAX, UNSPECIFIED TYPE: Primary | ICD-10-CM

## 2023-11-08 PROCEDURE — 1123F ACP DISCUSS/DSCN MKR DOCD: CPT | Performed by: FAMILY MEDICINE

## 2023-11-08 PROCEDURE — G8427 DOCREV CUR MEDS BY ELIG CLIN: HCPCS | Performed by: FAMILY MEDICINE

## 2023-11-08 PROCEDURE — G8420 CALC BMI NORM PARAMETERS: HCPCS | Performed by: FAMILY MEDICINE

## 2023-11-08 PROCEDURE — 71046 X-RAY EXAM CHEST 2 VIEWS: CPT

## 2023-11-08 PROCEDURE — 1036F TOBACCO NON-USER: CPT | Performed by: FAMILY MEDICINE

## 2023-11-08 PROCEDURE — 1090F PRES/ABSN URINE INCON ASSESS: CPT | Performed by: FAMILY MEDICINE

## 2023-11-08 PROCEDURE — 3077F SYST BP >= 140 MM HG: CPT | Performed by: FAMILY MEDICINE

## 2023-11-08 PROCEDURE — G8484 FLU IMMUNIZE NO ADMIN: HCPCS | Performed by: FAMILY MEDICINE

## 2023-11-08 PROCEDURE — 3078F DIAST BP <80 MM HG: CPT | Performed by: FAMILY MEDICINE

## 2023-11-08 PROCEDURE — G8400 PT W/DXA NO RESULTS DOC: HCPCS | Performed by: FAMILY MEDICINE

## 2023-11-08 PROCEDURE — 99214 OFFICE O/P EST MOD 30 MIN: CPT | Performed by: FAMILY MEDICINE

## 2023-11-08 RX ORDER — DIPHENOXYLATE HYDROCHLORIDE AND ATROPINE SULFATE 2.5; .025 MG/1; MG/1
1 TABLET ORAL 4 TIMES DAILY PRN
Qty: 30 TABLET | Refills: 0 | Status: SHIPPED | OUTPATIENT
Start: 2023-11-08 | End: 2023-12-08

## 2023-11-08 RX ORDER — AMLODIPINE BESYLATE 2.5 MG/1
2.5 TABLET ORAL
Qty: 90 TABLET | Refills: 1 | Status: SHIPPED | OUTPATIENT
Start: 2023-11-08

## 2023-11-08 RX ORDER — HYDROCODONE BITARTRATE AND ACETAMINOPHEN 5; 325 MG/1; MG/1
1 TABLET ORAL EVERY 8 HOURS PRN
Qty: 45 TABLET | Refills: 0 | Status: SHIPPED | OUTPATIENT
Start: 2023-11-08 | End: 2023-12-08

## 2023-11-08 RX ORDER — GABAPENTIN 100 MG/1
200 CAPSULE ORAL
Qty: 180 CAPSULE | Refills: 0
Start: 2023-11-08 | End: 2024-02-06

## 2023-11-08 RX ORDER — HYDROCODONE BITARTRATE AND ACETAMINOPHEN 5; 325 MG/1; MG/1
1 TABLET ORAL EVERY 8 HOURS PRN
Qty: 45 TABLET | Refills: 0 | Status: SHIPPED | OUTPATIENT
Start: 2024-01-08 | End: 2024-02-07

## 2023-11-08 RX ORDER — HYDROCODONE BITARTRATE AND ACETAMINOPHEN 5; 325 MG/1; MG/1
1 TABLET ORAL EVERY 8 HOURS PRN
Qty: 45 TABLET | Refills: 0 | Status: SHIPPED | OUTPATIENT
Start: 2023-12-08 | End: 2024-01-07

## 2023-11-08 ASSESSMENT — ENCOUNTER SYMPTOMS
GASTROINTESTINAL NEGATIVE: 1
SHORTNESS OF BREATH: 0

## 2023-11-08 NOTE — TELEPHONE ENCOUNTER
Pt has apt today with provider    PCP: Ruperto Coulter MD    Last appt: [unfilled]  Future Appointments   Date Time Provider [address]   11/8/2023 10:40 AM Ruperto Coulter MD [facility] AMB       Requested Prescriptions     Pending Prescriptions Disp Refills    diphenoxylate-atropine (LOMOTIL) 2.5-0.025 MG per tablet [Pharmacy Med Name: DIPHENOXYLATE HYDROCHLORIDE/ATROPINE SULFATE 2.5-0.025 MG Tablet] 30 tablet      Sig: TAKE 1 TABLET FOUR TIMES DAILY AS NEEDED FOR DIARRHEA (MAX DAILY AMOUNT: 4 TABLETS)       Prior labs and Blood pressures:  BP Readings from Last 3 Encounters:   07/19/23 (!) 177/66   07/06/23 (!) 189/76   05/08/23 (!) 174/68     Lab Results   Component Value Date/Time     01/12/2023 08:11 AM    K 4.2 01/12/2023 08:11 AM     01/12/2023 08:11 AM    CO2 29 01/12/2023 08:11 AM    BUN 19 01/12/2023 08:11 AM    GFRAA >60 04/28/2021 11:28 AM     No results found for: \"HBA1C\", \"[id number]\"  Lab Results   Component Value Date/Time    CHOL 235 08/10/2022 09:31 AM    HDL 85 08/10/2022 09:31 AM     No results found for: \"VITD3\", \"VD3RIA\"    Lab Results   Component Value Date/Time    TSH 2.63 04/28/2021 11:28 AM

## 2023-11-08 NOTE — PROGRESS NOTES
Malena Hinds  80 y.o. female  1940  [id number]  [facility] [address] [facility]  Progress Note     Encounter Date: 11/8/2023    Assessment and Plan:       ICD-10-CM    1. Hospital discharge follow-up  Z09       2. Heart block AV second degree  I44.1       3. Other chronic pain  G89.29 gabapentin (NEURONTIN) 100 MG capsule     HYDROcodone-acetaminophen (NORCO) 5-325 MG per tablet     HYDROcodone-acetaminophen (NORCO) 5-325 MG per tablet     HYDROcodone-acetaminophen (NORCO) 5-325 MG per tablet      4. Essential (primary) hypertension  I10         1. Hospital discharge follow-up  S/p Pacemaker  Meds reviewed  Ongoing pain from Chest tubes and right facial fracture  2. Heart block AV second degree  No longer having dizziness  3. Other chronic pain  Facial pain, Osteoarthritis, chest tube site pain  Patient is using meloxicam and 1/2 of a hydrocodone at a time for all of these pains as needed. I think we should try to taper these meds as she has gradual reduction of chest tube site pain and we will begin this next visit   is ok.  -     gabapentin (NEURONTIN) 100 MG capsule; Take 2 capsules by mouth nightly for 90 days. Max Daily Amount: 200 mg, Disp-180 capsule, R-0NO PRINT  -     HYDROcodone-acetaminophen (NORCO) 5-325 MG per tablet; Take 1 tablet by mouth every 8 hours as needed for Pain for up to 30 days. Take lowest dose possible to manage pain Max Daily Amount: 3 tablets, Disp-45 tablet, R-0Normal  -     HYDROcodone-acetaminophen (NORCO) 5-325 MG per tablet; Take 1 tablet by mouth every 8 hours as needed for Pain for up to 30 days. Intended supply: 5 days. Take lowest dose possible to manage pain Max Daily Amount: 3 tablets, Disp-45 tablet, R-0Normal  -     HYDROcodone-acetaminophen (NORCO) 5-325 MG per tablet; Take 1 tablet by mouth every 8 hours as needed for Pain for up to 30 days. Intended supply: 5 days.  Take lowest dose possible to manage pain Max Daily Amount: 3 tablets, Disp-45 tablet,

## 2023-11-08 NOTE — PROGRESS NOTES
1.\"Have you been to the ER, urgent care clinic since your last visit? Yes Pacemaker Hospitalized since your last visit? \"   Yes    2. \"Have you seen or consulted any other health care providers outside of the [address] [place] [address] since your last visit? \"  Dr Alecia Alcala

## 2024-02-02 DIAGNOSIS — G89.29 OTHER CHRONIC PAIN: ICD-10-CM

## 2024-02-02 NOTE — TELEPHONE ENCOUNTER
PCP: Brock Gilliland MD    Last appt: [unfilled]  Future Appointments   Date Time Provider Department Center   2/8/2024 10:40 AM Brock Gilliland MD [facility] AMB       Requested Prescriptions     Pending Prescriptions Disp Refills    fluticasone (FLONASE) 50 MCG/ACT nasal spray [Pharmacy Med Name: FLUTICASONE PROPIONATE 50 MCG/ACT Suspension] 32 g 3     Sig: USE 1 SPRAY IN EACH NOSTRIL EVERY DAY       Prior labs and Blood pressures:  BP Readings from Last 3 Encounters:   11/08/23 (!) 171/73   07/19/23 (!) 177/66   07/06/23 (!) 189/76     Lab Results   Component Value Date/Time     01/12/2023 08:11 AM    K 4.2 01/12/2023 08:11 AM     01/12/2023 08:11 AM    CO2 29 01/12/2023 08:11 AM    BUN 19 01/12/2023 08:11 AM    GFRAA >60 04/28/2021 11:28 AM     No results found for: \"HBA1C\", \"[id number]\"  Lab Results   Component Value Date/Time    CHOL 235 08/10/2022 09:31 AM    HDL 85 08/10/2022 09:31 AM     No results found for: \"VITD3\", \"VD3RIA\"    Lab Results   Component Value Date/Time    TSH 2.63 04/28/2021 11:28 AM

## 2024-02-02 NOTE — TELEPHONE ENCOUNTER
PCP: Brock Gilliland MD    Last appt: [unfilled]  Future Appointments   Date Time Provider Department Center   2/8/2024 10:40 AM Brock Gilliland MD [facility] AMB       Requested Prescriptions     Pending Prescriptions Disp Refills    gabapentin (NEURONTIN) 100 MG capsule [Pharmacy Med Name: GABAPENTIN 100 MG Capsule] 270 capsule      Sig: TAKE 1 CAPSULE IN THE MORNING AND TAKE 2 CAPSULES AT BEDTIME       Prior labs and Blood pressures:  BP Readings from Last 3 Encounters:   11/08/23 (!) 171/73   07/19/23 (!) 177/66   07/06/23 (!) 189/76     Lab Results   Component Value Date/Time     01/12/2023 08:11 AM    K 4.2 01/12/2023 08:11 AM     01/12/2023 08:11 AM    CO2 29 01/12/2023 08:11 AM    BUN 19 01/12/2023 08:11 AM    GFRAA >60 04/28/2021 11:28 AM     No results found for: \"HBA1C\", \"[id number]\"  Lab Results   Component Value Date/Time    CHOL 235 08/10/2022 09:31 AM    HDL 85 08/10/2022 09:31 AM     No results found for: \"VITD3\", \"VD3RIA\"    Lab Results   Component Value Date/Time    TSH 2.63 04/28/2021 11:28 AM

## 2024-02-04 RX ORDER — FLUTICASONE PROPIONATE 50 MCG
SPRAY, SUSPENSION (ML) NASAL
Qty: 3 EACH | Refills: 1 | Status: SHIPPED | OUTPATIENT
Start: 2024-02-04

## 2024-02-04 RX ORDER — GABAPENTIN 100 MG/1
CAPSULE ORAL
Qty: 270 CAPSULE | OUTPATIENT
Start: 2024-02-04

## 2024-02-08 ENCOUNTER — OFFICE VISIT (OUTPATIENT)
Facility: CLINIC | Age: 84
End: 2024-02-08
Payer: MEDICARE

## 2024-02-08 VITALS
TEMPERATURE: 98 F | OXYGEN SATURATION: 100 % | BODY MASS INDEX: 21.44 KG/M2 | SYSTOLIC BLOOD PRESSURE: 200 MMHG | RESPIRATION RATE: 15 BRPM | DIASTOLIC BLOOD PRESSURE: 79 MMHG | HEIGHT: 63 IN | HEART RATE: 75 BPM | WEIGHT: 121 LBS

## 2024-02-08 DIAGNOSIS — M15.9 GENERALIZED OSTEOARTHRITIS: ICD-10-CM

## 2024-02-08 DIAGNOSIS — G50.0 INFRAORBITAL NEURALGIA: ICD-10-CM

## 2024-02-08 DIAGNOSIS — G47.01 INSOMNIA DUE TO MEDICAL CONDITION: ICD-10-CM

## 2024-02-08 DIAGNOSIS — C43.30 MALIGNANT MELANOMA OF UNSPECIFIED PART OF FACE (HCC): ICD-10-CM

## 2024-02-08 DIAGNOSIS — G89.29 OTHER CHRONIC PAIN: ICD-10-CM

## 2024-02-08 DIAGNOSIS — I10 ESSENTIAL (PRIMARY) HYPERTENSION: Primary | ICD-10-CM

## 2024-02-08 PROCEDURE — 1036F TOBACCO NON-USER: CPT | Performed by: FAMILY MEDICINE

## 2024-02-08 PROCEDURE — 1123F ACP DISCUSS/DSCN MKR DOCD: CPT | Performed by: FAMILY MEDICINE

## 2024-02-08 PROCEDURE — G8400 PT W/DXA NO RESULTS DOC: HCPCS | Performed by: FAMILY MEDICINE

## 2024-02-08 PROCEDURE — G8420 CALC BMI NORM PARAMETERS: HCPCS | Performed by: FAMILY MEDICINE

## 2024-02-08 PROCEDURE — 3077F SYST BP >= 140 MM HG: CPT | Performed by: FAMILY MEDICINE

## 2024-02-08 PROCEDURE — 1090F PRES/ABSN URINE INCON ASSESS: CPT | Performed by: FAMILY MEDICINE

## 2024-02-08 PROCEDURE — 3078F DIAST BP <80 MM HG: CPT | Performed by: FAMILY MEDICINE

## 2024-02-08 PROCEDURE — 99214 OFFICE O/P EST MOD 30 MIN: CPT | Performed by: FAMILY MEDICINE

## 2024-02-08 PROCEDURE — G8428 CUR MEDS NOT DOCUMENT: HCPCS | Performed by: FAMILY MEDICINE

## 2024-02-08 PROCEDURE — G8484 FLU IMMUNIZE NO ADMIN: HCPCS | Performed by: FAMILY MEDICINE

## 2024-02-08 RX ORDER — HYDROCODONE BITARTRATE AND ACETAMINOPHEN 5; 325 MG/1; MG/1
1 TABLET ORAL EVERY 8 HOURS PRN
Qty: 45 TABLET | Refills: 0 | Status: SHIPPED | OUTPATIENT
Start: 2024-03-08 | End: 2024-04-07

## 2024-02-08 RX ORDER — GABAPENTIN 100 MG/1
200 CAPSULE ORAL
Qty: 180 CAPSULE | Refills: 1 | Status: SHIPPED | OUTPATIENT
Start: 2024-02-08 | End: 2024-08-06

## 2024-02-08 RX ORDER — HYDROCODONE BITARTRATE AND ACETAMINOPHEN 5; 325 MG/1; MG/1
1 TABLET ORAL EVERY 8 HOURS PRN
Qty: 45 TABLET | Refills: 0 | Status: SHIPPED | OUTPATIENT
Start: 2024-04-08 | End: 2024-05-08

## 2024-02-08 RX ORDER — QUETIAPINE FUMARATE 25 MG/1
25 TABLET, FILM COATED ORAL
Qty: 30 TABLET | Refills: 2 | Status: SHIPPED | OUTPATIENT
Start: 2024-02-08

## 2024-02-08 RX ORDER — LISINOPRIL 10 MG/1
10 TABLET ORAL 2 TIMES DAILY
Qty: 180 TABLET | Refills: 1 | Status: SHIPPED | OUTPATIENT
Start: 2024-02-08

## 2024-02-08 RX ORDER — HYDROCODONE BITARTRATE AND ACETAMINOPHEN 5; 325 MG/1; MG/1
1 TABLET ORAL EVERY 8 HOURS PRN
Qty: 45 TABLET | Refills: 0 | Status: SHIPPED | OUTPATIENT
Start: 2024-02-08 | End: 2024-03-09

## 2024-02-08 ASSESSMENT — PATIENT HEALTH QUESTIONNAIRE - PHQ9
SUM OF ALL RESPONSES TO PHQ9 QUESTIONS 1 & 2: 0
2. FEELING DOWN, DEPRESSED OR HOPELESS: 0
SUM OF ALL RESPONSES TO PHQ QUESTIONS 1-9: 0
1. LITTLE INTEREST OR PLEASURE IN DOING THINGS: 0

## 2024-02-08 ASSESSMENT — ENCOUNTER SYMPTOMS
BLOOD IN STOOL: 0
SHORTNESS OF BREATH: 0

## 2024-02-08 NOTE — PROGRESS NOTES
Francisca Arauz  83 y.o. female  1940  MRN:[id number]  [facility]  Progress Note     Encounter Date: 2/8/2024    Assessment and Plan:       ICD-10-CM    1. Essential (primary) hypertension  I10 lisinopril (PRINIVIL;ZESTRIL) 10 MG tablet      2. 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 Generalized osteoarthritis  M15.9       4. 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 Malignant melanoma of unspecified part of face (HCC)  C43.30       6. Other chronic pain  G89.29 gabapentin (NEURONTIN) 100 MG capsule        1. Essential (primary) hypertension  No longer dizzy since she had her pacemaker place  Bradycardias were likely the cause of her dizziness and syncope, not her BP meds.  BP clearly up  Increase lisinopril back to 10 mg BID  Sees Cardiology next month.    See me again in office after that  -     lisinopril (PRINIVIL;ZESTRIL) 10 MG tablet; Take 1 tablet by mouth 2 times daily, Disp-180 tablet, R-1Normal  2. Infraorbital neuralgia  Continue gabapentin qhs  Continue hydrocodone 1.5 tabs average daily   Looks OK.  No signs of misuse or diversion  -     gabapentin (NEURONTIN) 100 MG capsule; Take 2 capsules by mouth nightly for 180 days. Max Daily Amount: 200 mg, Disp-180 capsule, R-1Normal  -     HYDROcodone-acetaminophen (NORCO) 5-325 MG per tablet; Take 1 tablet by mouth every 8 hours as needed for Pain for up to 30 days. Intended supply: 5 days. Take lowest dose possible to manage pain Max Daily Amount: 3 tablets, Disp-45 tablet, R-0Normal  -     HYDROcodone-acetaminophen (NORCO) 5-325 MG per tablet; Take 1 tablet by mouth every 8 hours as needed for Pain for up to 30 days. Intended supply: 5 days. Take lowest dose possible to manage

## 2024-02-08 NOTE — PROGRESS NOTES
dentified pt with two pt identifiers(name and ).    Chief Complaint   Patient presents with    Medication Refill     Patient here for pain medication.        Health Maintenance Due   Topic    COVID-19 Vaccine (1)    DTaP/Tdap/Td vaccine (1 - Tdap)    Shingles vaccine (1 of 2)    DEXA (modify frequency per FRAX score)     Respiratory Syncytial Virus (RSV) Pregnant or age 60 yrs+ (1 - 1-dose 60+ series)    Flu vaccine (1)    Annual Wellness Visit (Medicare)        Wt Readings from Last 3 Encounters:   24 54.9 kg (121 lb)   23 53.7 kg (118 lb 6.4 oz)   23 53.1 kg (117 lb)     Temp Readings from Last 3 Encounters:   24 98 °F (36.7 °C) (Temporal)   23 99.1 °F (37.3 °C) (Temporal)   23 98.1 °F (36.7 °C) (Temporal)     BP Readings from Last 3 Encounters:   24 (!) 194/80   23 (!) 171/73   23 (!) 177/66     Pulse Readings from Last 3 Encounters:   24 75   23 76   23 80           Coordination of Care Questionnaire:  :   1. \"Have you been to the ER, urgent care clinic since your last visit?  Hospitalized since your last visit?\" no    2. \"Have you seen or consulted any other health care providers outside of the [facility] since your last visit?\" no     3. For patients aged 45-75: Has the patient had a colonoscopy / FIT/ Cologuard? no      If the patient is female:    4. For patients aged 40-74: Has the patient had a mammogram within the past 2 years? no      5. For patients aged 21-65: Has the patient had a pap smear? no     3) Do you have an Advance Directive on file? no  Are you interested in receiving information about Advance Directives? no    Patient is accompanied by self I have received verbal consent from Francisca Arauz to discuss any/all medical information while they are present in the room.

## 2024-02-29 ENCOUNTER — TELEPHONE (OUTPATIENT)
Facility: CLINIC | Age: 84
End: 2024-02-29

## 2024-03-18 ENCOUNTER — OFFICE VISIT (OUTPATIENT)
Facility: CLINIC | Age: 84
End: 2024-03-18
Payer: MEDICARE

## 2024-03-18 VITALS
WEIGHT: 122 LBS | RESPIRATION RATE: 20 BRPM | SYSTOLIC BLOOD PRESSURE: 185 MMHG | TEMPERATURE: 97.8 F | BODY MASS INDEX: 21.62 KG/M2 | DIASTOLIC BLOOD PRESSURE: 73 MMHG | HEIGHT: 63 IN | HEART RATE: 68 BPM | OXYGEN SATURATION: 98 %

## 2024-03-18 DIAGNOSIS — M65.351 TRIGGER LITTLE FINGER OF RIGHT HAND: ICD-10-CM

## 2024-03-18 DIAGNOSIS — G47.01 INSOMNIA DUE TO MEDICAL CONDITION: ICD-10-CM

## 2024-03-18 DIAGNOSIS — G50.0 INFRAORBITAL NEURALGIA: ICD-10-CM

## 2024-03-18 DIAGNOSIS — I10 ESSENTIAL (PRIMARY) HYPERTENSION: ICD-10-CM

## 2024-03-18 DIAGNOSIS — Z00.00 MEDICARE ANNUAL WELLNESS VISIT, SUBSEQUENT: Primary | ICD-10-CM

## 2024-03-18 DIAGNOSIS — G89.29 OTHER CHRONIC PAIN: ICD-10-CM

## 2024-03-18 DIAGNOSIS — M15.9 GENERALIZED OSTEOARTHRITIS: ICD-10-CM

## 2024-03-18 PROCEDURE — G8420 CALC BMI NORM PARAMETERS: HCPCS | Performed by: FAMILY MEDICINE

## 2024-03-18 PROCEDURE — G0439 PPPS, SUBSEQ VISIT: HCPCS | Performed by: FAMILY MEDICINE

## 2024-03-18 PROCEDURE — 99214 OFFICE O/P EST MOD 30 MIN: CPT | Performed by: FAMILY MEDICINE

## 2024-03-18 PROCEDURE — G8427 DOCREV CUR MEDS BY ELIG CLIN: HCPCS | Performed by: FAMILY MEDICINE

## 2024-03-18 PROCEDURE — G8484 FLU IMMUNIZE NO ADMIN: HCPCS | Performed by: FAMILY MEDICINE

## 2024-03-18 PROCEDURE — 1036F TOBACCO NON-USER: CPT | Performed by: FAMILY MEDICINE

## 2024-03-18 PROCEDURE — 3078F DIAST BP <80 MM HG: CPT | Performed by: FAMILY MEDICINE

## 2024-03-18 PROCEDURE — G8400 PT W/DXA NO RESULTS DOC: HCPCS | Performed by: FAMILY MEDICINE

## 2024-03-18 PROCEDURE — 3077F SYST BP >= 140 MM HG: CPT | Performed by: FAMILY MEDICINE

## 2024-03-18 PROCEDURE — 20550 NJX 1 TENDON SHEATH/LIGAMENT: CPT | Performed by: FAMILY MEDICINE

## 2024-03-18 PROCEDURE — 1090F PRES/ABSN URINE INCON ASSESS: CPT | Performed by: FAMILY MEDICINE

## 2024-03-18 PROCEDURE — 1123F ACP DISCUSS/DSCN MKR DOCD: CPT | Performed by: FAMILY MEDICINE

## 2024-03-18 RX ORDER — AMLODIPINE BESYLATE 5 MG/1
5 TABLET ORAL DAILY
Qty: 90 TABLET | Refills: 1 | Status: SHIPPED | OUTPATIENT
Start: 2024-03-18

## 2024-03-18 RX ORDER — GABAPENTIN 100 MG/1
200 CAPSULE ORAL
Qty: 180 CAPSULE | Refills: 1 | Status: SHIPPED | OUTPATIENT
Start: 2024-03-18 | End: 2024-09-14

## 2024-03-18 ASSESSMENT — PATIENT HEALTH QUESTIONNAIRE - PHQ9
2. FEELING DOWN, DEPRESSED OR HOPELESS: NOT AT ALL
SUM OF ALL RESPONSES TO PHQ QUESTIONS 1-9: 0
SUM OF ALL RESPONSES TO PHQ QUESTIONS 1-9: 0
SUM OF ALL RESPONSES TO PHQ9 QUESTIONS 1 & 2: 0
SUM OF ALL RESPONSES TO PHQ QUESTIONS 1-9: 0
SUM OF ALL RESPONSES TO PHQ QUESTIONS 1-9: 0
1. LITTLE INTEREST OR PLEASURE IN DOING THINGS: NOT AT ALL

## 2024-03-18 ASSESSMENT — ENCOUNTER SYMPTOMS
BLOOD IN STOOL: 0
SHORTNESS OF BREATH: 0

## 2024-03-18 ASSESSMENT — LIFESTYLE VARIABLES
HOW MANY STANDARD DRINKS CONTAINING ALCOHOL DO YOU HAVE ON A TYPICAL DAY: PATIENT DOES NOT DRINK
HOW OFTEN DO YOU HAVE A DRINK CONTAINING ALCOHOL: NEVER

## 2024-03-18 NOTE — PROGRESS NOTES
dentified pt with two pt identifiers(name and ).    Chief Complaint   Patient presents with    Medicare AWV     Patient here for a Medicare Wellness visit.        Health Maintenance Due   Topic    COVID-19 Vaccine (1)    DTaP/Tdap/Td vaccine (1 - Tdap)    Shingles vaccine (1 of 2)    DEXA (modify frequency per FRAX score)     Respiratory Syncytial Virus (RSV) Pregnant or age 60 yrs+ (1 - 1-dose 60+ series)    Flu vaccine (1)    Annual Wellness Visit (Medicare)        Wt Readings from Last 3 Encounters:   24 55.3 kg (122 lb)   24 54.9 kg (121 lb)   23 53.7 kg (118 lb 6.4 oz)     Temp Readings from Last 3 Encounters:   24 97.8 °F (36.6 °C) (Temporal)   24 98 °F (36.7 °C) (Temporal)   23 99.1 °F (37.3 °C) (Temporal)     BP Readings from Last 3 Encounters:   24 (!) 198/75   24 (!) 200/79   23 (!) 171/73     Pulse Readings from Last 3 Encounters:   24 68   24 75   23 76           Coordination of Care Questionnaire:  :   1. \"Have you been to the ER, urgent care clinic since your last visit?  Hospitalized since your last visit?\" no    2. \"Have you seen or consulted any other health care providers outside of the [facility] since your last visit?\" no     3. For patients aged 45-75: Has the patient had a colonoscopy / FIT/ Cologuard? no      If the patient is female:    4. For patients aged 40-74: Has the patient had a mammogram within the past 2 years? no      5. For patients aged 21-65: Has the patient had a pap smear? no     3) Do you have an Advance Directive on file? no  Are you interested in receiving information about Advance Directives? no    Patient is accompanied by self I have received verbal consent from Francisca Arauz to discuss any/all medical information while they are present in the room.   
Brock Gilliland MD   HYDROcodone-acetaminophen (NORCO) 5-325 MG per tablet Take 1 tablet by mouth every 8 hours as needed for Pain for up to 30 days. Intended supply: 5 days. Take lowest dose possible to manage pain Max Daily Amount: 3 tablets  rBock Gilliland MD   HYDROcodone-acetaminophen (NORCO) 5-325 MG per tablet Take 1 tablet by mouth every 8 hours as needed for Pain for up to 30 days. Intended supply: 5 days. Take lowest dose possible to manage pain Max Daily Amount: 3 tablets  Brock Gilliland MD   fluticasone (FLONASE) 50 MCG/ACT nasal spray USE 1 SPRAY IN EACH NOSTRIL EVERY DAY  Brock Gilliland MD   pramipexole (MIRAPEX) 0.125 MG tablet TAKE 2 TABLETS EVERY NIGHT  Brock Gilliland MD   omeprazole (PRILOSEC) 20 MG delayed release capsule TAKE 1 CAPSULE EVERY DAY  Brock Gilliland MD   BENZOCAINE, TOPICAL, 20 % LIQD Instill 4 to 5 drops of otic solution into external ear canal of affected ear(s), repeat every 1 to 2 hours if necessary  Automatic Reconciliation, Ar   meloxicam (MOBIC) 15 MG tablet Take 1 tablet by mouth daily  Automatic Reconciliation, Ar   nystatin (MYCOSTATIN) 475562 UNIT/ML suspension Take 5 mLs by mouth 4 times daily  Automatic Reconciliation, Ar       CareTeam (Including outside providers/suppliers regularly involved in providing care):   Patient Care Team:  Brock Gilliland MD as PCP - General  Brock Gilliland [facility]  Syd Harris MD as Consulting Physician  Carlos Eduardo Serrato Jr., MD as Consulting Physician  Haroldo Kirby MD as Consulting Physician  Tuckey-Larus, Corinne, MD as Gynecologist  Stefan Nielsen Jr., MD as Consulting Physician  Shakir Zamarripa MD as Consulting Physician     Reviewed and updated this visit:  Allergies  Meds  Problems  Med Hx  Surg Hx  Soc Hx  Fam Hx

## 2024-03-18 NOTE — PATIENT INSTRUCTIONS
[employer]. If you have questions about a medical condition or this instruction, always ask your healthcare professional. [employer] disclaims any warranty or liability for your use of this information.           A Healthy Heart: Care Instructions  Overview     Coronary artery disease, also called heart disease, occurs when a substance called plaque builds up in the vessels that supply oxygen-rich blood to your heart muscle. This can narrow the blood vessels and reduce blood flow. A heart attack happens when blood flow is completely blocked. A high-fat diet, smoking, and other factors increase the risk of heart disease.  Your doctor has found that you have a chance of having heart disease. A heart-healthy lifestyle can help keep your heart healthy and prevent heart disease. This lifestyle includes eating healthy, being active, staying at a weight that's healthy for you, and not smoking or using tobacco. It also includes taking medicines as directed, managing other health conditions, and trying to get a healthy amount of sleep.  Follow-up care is a key part of your treatment and safety. Be sure to make and go to all appointments, and call your doctor if you are having problems. It's also a good idea to know your test results and keep a list of the medicines you take.  How can you care for yourself at home?  Diet    Use less salt when you cook and eat. This helps lower your blood pressure. Taste food before salting. Add only a little salt when you think you need it. With time, your taste buds will adjust to less salt.     Eat fewer snack items, fast foods, canned soups, and other high-salt, high-fat, processed foods.     Read food labels and try to avoid saturated and trans fats. They increase your risk of heart disease by raising cholesterol levels.     Limit the amount of solid fat--butter, margarine, and shortening--you eat. Use olive, peanut, or canola oil when you cook. Bake, broil, and steam

## 2024-04-17 NOTE — TELEPHONE ENCOUNTER
PCP: Brock Gilliland MD    Last appt: [unfilled]  Future Appointments   Date Time Provider Department Center   5/8/2024 10:40 AM Brock Gilliland MD [facility]       Requested Prescriptions     Pending Prescriptions Disp Refills    meloxicam (MOBIC) 15 MG tablet 30 tablet      Sig: Take 1 tablet by mouth daily       Prior labs and Blood pressures:  BP Readings from Last 3 Encounters:   03/18/24 (!) 185/73   02/08/24 (!) 200/79   11/08/23 (!) 171/73     Lab Results   Component Value Date/Time     01/12/2023 08:11 AM    K 4.2 01/12/2023 08:11 AM     01/12/2023 08:11 AM    CO2 29 01/12/2023 08:11 AM    BUN 19 01/12/2023 08:11 AM    GFRAA >60 04/28/2021 11:28 AM     No results found for: \"HBA1C\", \"VKA0TQWV\"  Lab Results   Component Value Date/Time    CHOL 235 08/10/2022 09:31 AM    HDL 85 08/10/2022 09:31 AM     No results found for: \"VITD3\", \"VD3RIA\"    Lab Results   Component Value Date/Time    TSH 2.63 04/28/2021 11:28 AM

## 2024-04-18 RX ORDER — MELOXICAM 15 MG/1
15 TABLET ORAL DAILY
Qty: 30 TABLET | OUTPATIENT
Start: 2024-04-18

## 2024-05-08 ENCOUNTER — OFFICE VISIT (OUTPATIENT)
Facility: CLINIC | Age: 84
End: 2024-05-08

## 2024-05-08 VITALS
OXYGEN SATURATION: 98 % | DIASTOLIC BLOOD PRESSURE: 69 MMHG | BODY MASS INDEX: 20.91 KG/M2 | TEMPERATURE: 98.5 F | HEIGHT: 63 IN | SYSTOLIC BLOOD PRESSURE: 168 MMHG | HEART RATE: 69 BPM | WEIGHT: 118 LBS | RESPIRATION RATE: 17 BRPM

## 2024-05-08 DIAGNOSIS — G89.29 OTHER CHRONIC PAIN: Primary | ICD-10-CM

## 2024-05-08 DIAGNOSIS — G50.0 INFRAORBITAL NEURALGIA: ICD-10-CM

## 2024-05-08 DIAGNOSIS — I10 ESSENTIAL (PRIMARY) HYPERTENSION: ICD-10-CM

## 2024-05-08 DIAGNOSIS — G47.01 INSOMNIA DUE TO MEDICAL CONDITION: ICD-10-CM

## 2024-05-08 RX ORDER — TRAZODONE HYDROCHLORIDE 50 MG/1
50 TABLET ORAL NIGHTLY
Qty: 90 TABLET | Refills: 1 | Status: SHIPPED | OUTPATIENT
Start: 2024-05-08

## 2024-05-08 RX ORDER — HYDROCODONE BITARTRATE AND ACETAMINOPHEN 5; 325 MG/1; MG/1
1 TABLET ORAL EVERY 6 HOURS PRN
Qty: 30 TABLET | Refills: 0 | Status: SHIPPED | OUTPATIENT
Start: 2024-07-07 | End: 2024-08-06

## 2024-05-08 RX ORDER — HYDROCODONE BITARTRATE AND ACETAMINOPHEN 5; 325 MG/1; MG/1
1 TABLET ORAL EVERY 6 HOURS PRN
Qty: 45 TABLET | Refills: 0 | Status: SHIPPED | OUTPATIENT
Start: 2024-06-07 | End: 2024-07-07

## 2024-05-08 RX ORDER — HYDROCODONE BITARTRATE AND ACETAMINOPHEN 5; 325 MG/1; MG/1
1 TABLET ORAL EVERY 8 HOURS PRN
Qty: 45 TABLET | Refills: 0 | Status: SHIPPED | OUTPATIENT
Start: 2024-05-08 | End: 2024-06-07

## 2024-05-08 RX ORDER — MELOXICAM 15 MG/1
15 TABLET ORAL DAILY
Qty: 90 TABLET | Refills: 1 | Status: SHIPPED | OUTPATIENT
Start: 2024-05-08

## 2024-05-08 SDOH — ECONOMIC STABILITY: FOOD INSECURITY: WITHIN THE PAST 12 MONTHS, YOU WORRIED THAT YOUR FOOD WOULD RUN OUT BEFORE YOU GOT MONEY TO BUY MORE.: NEVER TRUE

## 2024-05-08 SDOH — ECONOMIC STABILITY: FOOD INSECURITY: WITHIN THE PAST 12 MONTHS, THE FOOD YOU BOUGHT JUST DIDN'T LAST AND YOU DIDN'T HAVE MONEY TO GET MORE.: NEVER TRUE

## 2024-05-08 SDOH — ECONOMIC STABILITY: INCOME INSECURITY: HOW HARD IS IT FOR YOU TO PAY FOR THE VERY BASICS LIKE FOOD, HOUSING, MEDICAL CARE, AND HEATING?: NOT HARD AT ALL

## 2024-05-08 ASSESSMENT — ENCOUNTER SYMPTOMS
BACK PAIN: 1
GASTROINTESTINAL NEGATIVE: 1
SHORTNESS OF BREATH: 0

## 2024-05-08 NOTE — PROGRESS NOTES
Identified patient with two patient identifiers (name and ). Reviewed chart in preparation for visit and have obtained necessary documentation.    Francisca Arauz is a 83 y.o. female  Chief Complaint   Patient presents with    3 Month Follow-Up     BP (!) 168/69 (Site: Left Upper Arm, Position: Sitting, Cuff Size: Medium Adult)   Pulse 69   Temp 98.5 °F (36.9 °C) (Oral)   Resp 17   Ht 1.6 m (5' 3\")   Wt 53.5 kg (118 lb)   SpO2 98%   BMI 20.90 kg/m²     1. Have you been to the ER, urgent care clinic since your last visit?  Hospitalized since your last visit?no    2. Have you seen or consulted any other health care providers outside of the [facility] since your last visit?  Include any pap smears or colon screening. no

## 2024-05-08 NOTE — PROGRESS NOTES
Francisca Arauz  83 y.o. female  1940  MRN:[id number]  [facility]  Progress Note     Encounter Date: 5/8/2024    Assessment and Plan:       ICD-10-CM    1. Other chronic pain  G89.29 meloxicam (MOBIC) 15 MG tablet     HYDROcodone-acetaminophen (NORCO) 5-325 MG per tablet     HYDROcodone-acetaminophen (NORCO) 5-325 MG per tablet      2. 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 Insomnia due to medical condition  G47.01 traZODone (DESYREL) 50 MG tablet      4. Essential (primary) hypertension  I10         1. Other chronic pain  Refilled chronic pain meds   checked and looks OK  No signs of misuse or diversion.  -     meloxicam (MOBIC) 15 MG tablet; Take 1 tablet by mouth daily, Disp-90 tablet, R-1Normal  -     HYDROcodone-acetaminophen (NORCO) 5-325 MG per tablet; Take 1 tablet by mouth every 6 hours as needed for Pain for up to 30 days. Intended supply: 5 days. Take lowest dose possible to manage pain Max Daily Amount: 4 tablets, Disp-45 tablet, R-0Normal  -     HYDROcodone-acetaminophen (NORCO) 5-325 MG per tablet; Take 1 tablet by mouth every 6 hours as needed for Pain for up to 30 days. Take lowest dose possible to manage pain Max Daily Amount: 4 tablets, Disp-30 tablet, R-0Normal  -     HYDROcodone-acetaminophen (NORCO) 5-325 MG per tablet; Take 1 tablet by mouth every 8 hours as needed for Pain for up to 30 days. Intended supply: 5 days. Take lowest dose possible to manage pain Max Daily Amount: 3 tablets, Disp-45 tablet, R-0Normal  2. Infraorbital neuralgia  As above  Also has sciatica right leg  May try sciatica band for that leg for nighttime relief.    3. Insomnia due to medical condition  Resume trazodone for sleep  -     traZODone (DESYREL) 50 MG tablet; Take 1 tablet by mouth nightly, Disp-90 tablet, R-1Normal  4. Essential (primary) hypertension  Much improved.  At goal at home  Continue current meds       I have

## 2024-07-26 DIAGNOSIS — I10 ESSENTIAL (PRIMARY) HYPERTENSION: ICD-10-CM

## 2024-07-29 RX ORDER — PRAMIPEXOLE DIHYDROCHLORIDE 0.12 MG/1
TABLET ORAL
Qty: 180 TABLET | Refills: 1 | Status: SHIPPED | OUTPATIENT
Start: 2024-07-29

## 2024-07-29 RX ORDER — LISINOPRIL 10 MG/1
10 TABLET ORAL 2 TIMES DAILY
Qty: 180 TABLET | Refills: 1 | Status: SHIPPED | OUTPATIENT
Start: 2024-07-29

## 2024-08-08 ENCOUNTER — OFFICE VISIT (OUTPATIENT)
Facility: CLINIC | Age: 84
End: 2024-08-08

## 2024-08-08 VITALS
WEIGHT: 116 LBS | HEART RATE: 75 BPM | HEIGHT: 63 IN | BODY MASS INDEX: 20.55 KG/M2 | RESPIRATION RATE: 17 BRPM | DIASTOLIC BLOOD PRESSURE: 67 MMHG | SYSTOLIC BLOOD PRESSURE: 167 MMHG | OXYGEN SATURATION: 98 % | TEMPERATURE: 97.9 F

## 2024-08-08 DIAGNOSIS — G50.0 INFRAORBITAL NEURALGIA: ICD-10-CM

## 2024-08-08 DIAGNOSIS — D50.9 IRON DEFICIENCY ANEMIA, UNSPECIFIED IRON DEFICIENCY ANEMIA TYPE: ICD-10-CM

## 2024-08-08 DIAGNOSIS — I10 ESSENTIAL (PRIMARY) HYPERTENSION: ICD-10-CM

## 2024-08-08 DIAGNOSIS — M79.644 PAIN OF FINGER OF RIGHT HAND: ICD-10-CM

## 2024-08-08 DIAGNOSIS — G89.29 OTHER CHRONIC PAIN: Primary | ICD-10-CM

## 2024-08-08 LAB
ANION GAP SERPL CALC-SCNC: 2 MMOL/L (ref 5–15)
BASOPHILS # BLD: 0 K/UL (ref 0–0.1)
BASOPHILS NFR BLD: 1 % (ref 0–1)
BUN SERPL-MCNC: 16 MG/DL (ref 6–20)
BUN/CREAT SERPL: 16 (ref 12–20)
CALCIUM SERPL-MCNC: 9.8 MG/DL (ref 8.5–10.1)
CHLORIDE SERPL-SCNC: 104 MMOL/L (ref 97–108)
CO2 SERPL-SCNC: 31 MMOL/L (ref 21–32)
CREAT SERPL-MCNC: 1 MG/DL (ref 0.55–1.02)
DIFFERENTIAL METHOD BLD: NORMAL
EOSINOPHIL # BLD: 0.1 K/UL (ref 0–0.4)
EOSINOPHIL NFR BLD: 1 % (ref 0–7)
ERYTHROCYTE [DISTWIDTH] IN BLOOD BY AUTOMATED COUNT: 12.6 % (ref 11.5–14.5)
FERRITIN SERPL-MCNC: 88 NG/ML (ref 8–252)
GLUCOSE SERPL-MCNC: 96 MG/DL (ref 65–100)
HCT VFR BLD AUTO: 38.4 % (ref 35–47)
HGB BLD-MCNC: 12.4 G/DL (ref 11.5–16)
IMM GRANULOCYTES # BLD AUTO: 0 K/UL (ref 0–0.04)
IMM GRANULOCYTES NFR BLD AUTO: 0 % (ref 0–0.5)
IRON SATN MFR SERPL: 42 % (ref 20–50)
IRON SERPL-MCNC: 138 UG/DL (ref 35–150)
LYMPHOCYTES # BLD: 1.7 K/UL (ref 0.8–3.5)
LYMPHOCYTES NFR BLD: 24 % (ref 12–49)
MCH RBC QN AUTO: 31.1 PG (ref 26–34)
MCHC RBC AUTO-ENTMCNC: 32.3 G/DL (ref 30–36.5)
MCV RBC AUTO: 96.2 FL (ref 80–99)
MONOCYTES # BLD: 0.5 K/UL (ref 0–1)
MONOCYTES NFR BLD: 7 % (ref 5–13)
NEUTS SEG # BLD: 4.8 K/UL (ref 1.8–8)
NEUTS SEG NFR BLD: 67 % (ref 32–75)
NRBC # BLD: 0 K/UL (ref 0–0.01)
NRBC BLD-RTO: 0 PER 100 WBC
PLATELET # BLD AUTO: 176 K/UL (ref 150–400)
PMV BLD AUTO: 11.8 FL (ref 8.9–12.9)
POTASSIUM SERPL-SCNC: 4.8 MMOL/L (ref 3.5–5.1)
RBC # BLD AUTO: 3.99 M/UL (ref 3.8–5.2)
SODIUM SERPL-SCNC: 137 MMOL/L (ref 136–145)
TIBC SERPL-MCNC: 332 UG/DL (ref 250–450)
WBC # BLD AUTO: 7.1 K/UL (ref 3.6–11)

## 2024-08-08 RX ORDER — HYDROCODONE BITARTRATE AND ACETAMINOPHEN 5; 325 MG/1; MG/1
1 TABLET ORAL EVERY 8 HOURS PRN
Qty: 45 TABLET | Refills: 0 | Status: SHIPPED | OUTPATIENT
Start: 2024-10-08 | End: 2024-11-07

## 2024-08-08 RX ORDER — HYDROCODONE BITARTRATE AND ACETAMINOPHEN 5; 325 MG/1; MG/1
1 TABLET ORAL EVERY 8 HOURS PRN
Qty: 45 TABLET | Refills: 0 | Status: SHIPPED | OUTPATIENT
Start: 2024-08-08 | End: 2024-09-07

## 2024-08-08 RX ORDER — HYDROCODONE BITARTRATE AND ACETAMINOPHEN 5; 325 MG/1; MG/1
1 TABLET ORAL EVERY 8 HOURS PRN
Qty: 45 TABLET | Refills: 0 | Status: SHIPPED | OUTPATIENT
Start: 2024-09-08 | End: 2024-10-08

## 2024-08-08 ASSESSMENT — ENCOUNTER SYMPTOMS: SHORTNESS OF BREATH: 0

## 2024-08-08 NOTE — PROGRESS NOTES
Identified patient with two patient identifiers (name and ). Reviewed chart in preparation for visit and have obtained necessary documentation.    Francisca Arauz is a 83 y.o. female  Chief Complaint   Patient presents with    6 Month Follow-Up    Medication Refill     Resp 17   Ht 1.6 m (5' 3\")   Wt 52.6 kg (116 lb)   BMI 20.55 kg/m²     1. Have you been to the ER, urgent care clinic since your last visit?  Hospitalized since your last visit?no    2. Have you seen or consulted any other health care providers outside of the [facility] since your last visit?  Include any pap smears or colon screening. no

## 2024-08-08 NOTE — PROGRESS NOTES
Francisca Arauz  83 y.o. female  1940  MRN:[id number]  [facility]  Progress Note     Encounter Date: 8/8/2024    Assessment and Plan:       ICD-10-CM    1. Other chronic pain  G89.29 HYDROcodone-acetaminophen (NORCO) 5-325 MG per tablet     HYDROcodone-acetaminophen (NORCO) 5-325 MG per tablet     HYDROcodone-acetaminophen (LORCET) 5-325 MG per tablet      2. Infraorbital neuralgia  G50.0 HYDROcodone-acetaminophen (NORCO) 5-325 MG per tablet     HYDROcodone-acetaminophen (NORCO) 5-325 MG per tablet     HYDROcodone-acetaminophen (LORCET) 5-325 MG per tablet      3. Essential (primary) hypertension  I10 Basic Metabolic Panel     Basic Metabolic Panel      4. Pain of finger of right hand  M79.644       5. Iron deficiency anemia, unspecified iron deficiency anemia type  D50.9 CBC with Auto Differential     Iron and TIBC     Ferritin     Ferritin     Iron and TIBC     CBC with Auto Differential        1. Chronic pain  Entrapped infraorbital nerve right orbit after fall and fracture.  Averaging about one hydrocodone daily.  New rx for Aug/Sep/Oct   is ok  Pain contract completed  -     HYDROcodone-acetaminophen (NORCO) 5-325 MG per tablet; Take 1 tablet by mouth every 8 hours as needed for Pain for up to 30 days. Take lowest dose possible to manage pain Max Daily Amount: 3 tablets, Disp-45 tablet, R-0Normal  -     HYDROcodone-acetaminophen (NORCO) 5-325 MG per tablet; Take 1 tablet by mouth every 8 hours as needed for Pain for up to 30 days. Take lowest dose possible to manage pain Max Daily Amount: 3 tablets, Disp-45 tablet, R-0Normal  -     HYDROcodone-acetaminophen (LORCET) 5-325 MG per tablet; Take 1 tablet by mouth every 8 hours as needed for Pain for up to 30 days. Take lowest dose possible to manage pain Max Daily Amount: 3 tablets, Disp-45 tablet, R-0Normal  2. Infraorbital neuralgia  -     HYDROcodone-acetaminophen (NORCO) 5-325 MG per tablet; Take 1 tablet by mouth every 8 hours

## 2024-10-15 DIAGNOSIS — G50.0 INFRAORBITAL NEURALGIA: ICD-10-CM

## 2024-10-15 DIAGNOSIS — G89.29 OTHER CHRONIC PAIN: ICD-10-CM

## 2024-10-15 DIAGNOSIS — K21.9 GASTROESOPHAGEAL REFLUX DISEASE WITHOUT ESOPHAGITIS: Primary | ICD-10-CM

## 2024-10-17 RX ORDER — GABAPENTIN 100 MG/1
CAPSULE ORAL
Qty: 180 CAPSULE | Refills: 1 | Status: SHIPPED | OUTPATIENT
Start: 2024-10-17 | End: 2025-04-15

## 2024-11-07 ENCOUNTER — OFFICE VISIT (OUTPATIENT)
Facility: CLINIC | Age: 84
End: 2024-11-07

## 2024-11-07 VITALS
HEART RATE: 65 BPM | SYSTOLIC BLOOD PRESSURE: 180 MMHG | WEIGHT: 119 LBS | DIASTOLIC BLOOD PRESSURE: 80 MMHG | TEMPERATURE: 98.1 F | RESPIRATION RATE: 16 BRPM | HEIGHT: 63 IN | OXYGEN SATURATION: 97 % | BODY MASS INDEX: 21.09 KG/M2

## 2024-11-07 DIAGNOSIS — G50.0 INFRAORBITAL NEURALGIA: ICD-10-CM

## 2024-11-07 DIAGNOSIS — I10 ESSENTIAL (PRIMARY) HYPERTENSION: ICD-10-CM

## 2024-11-07 DIAGNOSIS — G89.29 OTHER CHRONIC PAIN: Primary | ICD-10-CM

## 2024-11-07 DIAGNOSIS — R35.0 URINARY FREQUENCY: ICD-10-CM

## 2024-11-07 LAB
BILIRUBIN, URINE, POC: NEGATIVE
BLOOD URINE, POC: NORMAL
GLUCOSE URINE, POC: NEGATIVE
KETONES, URINE, POC: NEGATIVE
LEUKOCYTE ESTERASE, URINE, POC: NEGATIVE
NITRITE, URINE, POC: NEGATIVE
PH, URINE, POC: 6 (ref 4.6–8)
PROTEIN,URINE, POC: NEGATIVE
SPECIFIC GRAVITY, URINE, POC: 1.01 (ref 1–1.03)
URINALYSIS CLARITY, POC: CLEAR
URINALYSIS COLOR, POC: YELLOW
UROBILINOGEN, POC: NORMAL MG/DL

## 2024-11-07 RX ORDER — HYDROCODONE BITARTRATE AND ACETAMINOPHEN 5; 325 MG/1; MG/1
1 TABLET ORAL EVERY 8 HOURS PRN
Qty: 45 TABLET | Refills: 0 | Status: SHIPPED | OUTPATIENT
Start: 2025-01-07 | End: 2025-02-06

## 2024-11-07 RX ORDER — HYDROCODONE BITARTRATE AND ACETAMINOPHEN 5; 325 MG/1; MG/1
1 TABLET ORAL EVERY 8 HOURS PRN
Qty: 45 TABLET | Refills: 0 | Status: SHIPPED | OUTPATIENT
Start: 2024-12-07 | End: 2025-01-06

## 2024-11-07 RX ORDER — METOPROLOL SUCCINATE 25 MG/1
12.5 TABLET, EXTENDED RELEASE ORAL 2 TIMES DAILY
COMMUNITY

## 2024-11-07 RX ORDER — HYDROCODONE BITARTRATE AND ACETAMINOPHEN 5; 325 MG/1; MG/1
1 TABLET ORAL EVERY 8 HOURS PRN
Qty: 45 TABLET | Refills: 0 | Status: SHIPPED | OUTPATIENT
Start: 2024-11-07 | End: 2024-12-07

## 2024-11-07 ASSESSMENT — ENCOUNTER SYMPTOMS
BLOOD IN STOOL: 0
SHORTNESS OF BREATH: 0

## 2024-11-07 NOTE — PROGRESS NOTES
\"Have you been to the ER, urgent care clinic since your last visit?  Hospitalized since your last visit?\"    NO    “Have you seen or consulted any other health care providers outside our system since your last visit?”    NO            
URINALYSIS DIP STICK AUTO W/ MICRO    Collection Time: 11/07/24  9:00 AM   Result Value Ref Range    Color (UA POC) Yellow     Clarity (UA POC) Clear     Glucose, Urine, POC Negative     Bilirubin, Urine, POC Negative     Ketones, Urine, POC Negative     Specific Gravity, Urine, POC 1.010 1.001 - 1.035    Blood (UA POC) Small     pH, Urine, POC 6.0 4.6 - 8.0    Protein, Urine, POC Negative     Urobilinogen, POC 0.2 mg/dL <1.1 mg/dL    Nitrite, Urine, POC Negative Negative    Leukocyte Esterase, Urine, POC Negative       Disposition   Return in about 3 months (around 2/7/2025) for Medication follow up, Hypertension.   History   Patient's past medical, surgical and family histories were reviewed and updated.    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    Fibromyalgia     Generalized osteoarthritis 07/18/2018    GERD (gastroesophageal reflux disease)     Hyperlipidemia 07/18/2018    Hypertension     IBS (irritable bowel syndrome)     Lower back pain 07/18/2018    Melanoma (HCC) 2023    right lower cheek.    Osteoporosis 07/18/2018    Restless legs     S/P placement of cardiac pacemaker 08/2023    Type 2 heart block.     Past Surgical History:   Procedure Laterality Date    COLONOSCOPY  01/2010    EYE SURGERY      HYSTERECTOMY (CERVIX STATUS UNKNOWN)      HYSTERECTOMY, TOTAL ABDOMINAL (CERVIX REMOVED)      OTHER SURGICAL HISTORY      lumbar disc surgery    PACEMAKER PLACEMENT  08/2023    SALPINGO-OOPHORECTOMY        No relevant family history has been documented for this patient.   Social History       Tobacco History       Smoking Status  Never      Smokeless Tobacco Use  Never              Alcohol History       Alcohol Use Status  No              Drug Use       Drug Use Status  No              Sexual Activity       Sexually Active  Yes                     Allergies

## 2025-01-11 DIAGNOSIS — I10 ESSENTIAL (PRIMARY) HYPERTENSION: ICD-10-CM

## 2025-01-13 RX ORDER — LISINOPRIL 10 MG/1
10 TABLET ORAL 2 TIMES DAILY
Qty: 180 TABLET | Refills: 1 | Status: SHIPPED | OUTPATIENT
Start: 2025-01-13

## 2025-02-06 ENCOUNTER — TELEPHONE (OUTPATIENT)
Facility: CLINIC | Age: 85
End: 2025-02-06

## 2025-02-06 ENCOUNTER — OFFICE VISIT (OUTPATIENT)
Facility: CLINIC | Age: 85
End: 2025-02-06

## 2025-02-06 VITALS
SYSTOLIC BLOOD PRESSURE: 153 MMHG | OXYGEN SATURATION: 97 % | DIASTOLIC BLOOD PRESSURE: 75 MMHG | RESPIRATION RATE: 17 BRPM | HEIGHT: 63 IN | WEIGHT: 116.8 LBS | BODY MASS INDEX: 20.7 KG/M2 | TEMPERATURE: 98.7 F | HEART RATE: 76 BPM

## 2025-02-06 DIAGNOSIS — G50.0 INFRAORBITAL NEURALGIA: Primary | ICD-10-CM

## 2025-02-06 DIAGNOSIS — J02.9 ACUTE PHARYNGITIS, UNSPECIFIED ETIOLOGY: ICD-10-CM

## 2025-02-06 DIAGNOSIS — C43.30 MALIGNANT MELANOMA OF UNSPECIFIED PART OF FACE (HCC): ICD-10-CM

## 2025-02-06 DIAGNOSIS — J40 BRONCHITIS: ICD-10-CM

## 2025-02-06 DIAGNOSIS — K59.1 FUNCTIONAL DIARRHEA: ICD-10-CM

## 2025-02-06 DIAGNOSIS — G89.29 OTHER CHRONIC PAIN: ICD-10-CM

## 2025-02-06 DIAGNOSIS — G47.01 INSOMNIA DUE TO MEDICAL CONDITION: ICD-10-CM

## 2025-02-06 DIAGNOSIS — I10 PRIMARY HYPERTENSION: ICD-10-CM

## 2025-02-06 RX ORDER — AZITHROMYCIN 250 MG/1
TABLET, FILM COATED ORAL
Qty: 6 TABLET | Refills: 0 | Status: SHIPPED | OUTPATIENT
Start: 2025-02-06 | End: 2025-02-16

## 2025-02-06 RX ORDER — QUETIAPINE FUMARATE 25 MG/1
25 TABLET, FILM COATED ORAL
Qty: 90 TABLET | Refills: 1 | Status: SHIPPED | OUTPATIENT
Start: 2025-02-06

## 2025-02-06 RX ORDER — HYDROCODONE BITARTRATE AND ACETAMINOPHEN 5; 325 MG/1; MG/1
1 TABLET ORAL EVERY 8 HOURS PRN
Qty: 45 TABLET | Refills: 0 | Status: SHIPPED | OUTPATIENT
Start: 2025-04-07 | End: 2025-05-07

## 2025-02-06 RX ORDER — HYDROCODONE BITARTRATE AND ACETAMINOPHEN 5; 325 MG/1; MG/1
1 TABLET ORAL EVERY 8 HOURS PRN
Qty: 45 TABLET | Refills: 0 | Status: SHIPPED | OUTPATIENT
Start: 2025-02-06 | End: 2025-03-08

## 2025-02-06 RX ORDER — HYDROCODONE BITARTRATE AND ACETAMINOPHEN 5; 325 MG/1; MG/1
1 TABLET ORAL EVERY 8 HOURS PRN
Qty: 45 TABLET | Refills: 0 | Status: SHIPPED | OUTPATIENT
Start: 2025-03-08 | End: 2025-04-07

## 2025-02-06 RX ORDER — DIPHENOXYLATE HYDROCHLORIDE AND ATROPINE SULFATE 2.5; .025 MG/1; MG/1
1 TABLET ORAL 4 TIMES DAILY PRN
Qty: 30 TABLET | Refills: 0 | Status: SHIPPED | OUTPATIENT
Start: 2025-02-06 | End: 2025-02-16

## 2025-02-06 SDOH — ECONOMIC STABILITY: FOOD INSECURITY: WITHIN THE PAST 12 MONTHS, YOU WORRIED THAT YOUR FOOD WOULD RUN OUT BEFORE YOU GOT MONEY TO BUY MORE.: NEVER TRUE

## 2025-02-06 SDOH — ECONOMIC STABILITY: FOOD INSECURITY: WITHIN THE PAST 12 MONTHS, THE FOOD YOU BOUGHT JUST DIDN'T LAST AND YOU DIDN'T HAVE MONEY TO GET MORE.: NEVER TRUE

## 2025-02-06 ASSESSMENT — PATIENT HEALTH QUESTIONNAIRE - PHQ9
1. LITTLE INTEREST OR PLEASURE IN DOING THINGS: NOT AT ALL
2. FEELING DOWN, DEPRESSED OR HOPELESS: NOT AT ALL
SUM OF ALL RESPONSES TO PHQ QUESTIONS 1-9: 0
SUM OF ALL RESPONSES TO PHQ9 QUESTIONS 1 & 2: 0

## 2025-02-06 ASSESSMENT — ENCOUNTER SYMPTOMS
SORE THROAT: 1
GASTROINTESTINAL NEGATIVE: 1
SHORTNESS OF BREATH: 0
WHEEZING: 0
COUGH: 1

## 2025-02-06 NOTE — PROGRESS NOTES
Francisca Arauz  84 y.o. female  1940  MRN:[id number]  [facility]  Progress Note     Encounter Date: 2/6/2025    Assessment and Plan:       ICD-10-CM    1. Infraorbital neuralgia  G50.0 HYDROcodone-acetaminophen (NORCO) 5-325 MG per tablet     HYDROcodone-acetaminophen (NORCO) 5-325 MG per tablet     HYDROcodone-acetaminophen (NORCO) 5-325 MG per tablet      2. Other chronic pain  G89.29 HYDROcodone-acetaminophen (NORCO) 5-325 MG per tablet     HYDROcodone-acetaminophen (NORCO) 5-325 MG per tablet     HYDROcodone-acetaminophen (NORCO) 5-325 MG per tablet      3. Insomnia due to medical condition  G47.01 QUEtiapine (SEROQUEL) 25 MG tablet      4. Acute pharyngitis, unspecified etiology  J02.9 azithromycin (ZITHROMAX) 250 MG tablet      5. Bronchitis  J40 azithromycin (ZITHROMAX) 250 MG tablet      6. Functional diarrhea  K59.1 diphenoxylate-atropine (LOMOTIL) 2.5-0.025 MG per tablet      7. Malignant melanoma of unspecified part of face (HCC)  C43.30       8. Primary hypertension  I10         1. Infraorbital neuralgia  Chronic pain after orbital fracture with entrapment of infraorbital nerve right side  She has a routine which seems to work for her with norco and gabapentin at bedtime along with 1/2 tab of seroquel  Three month supply of pain meds written   is ok showing only my meds with no signs of abuse/misuse.  FU q 3 months  -     HYDROcodone-acetaminophen (NORCO) 5-325 MG per tablet; Take 1 tablet by mouth every 8 hours as needed for Pain for up to 30 days. Intended supply: 30 days. Take lowest dose possible to manage pain Max Daily Amount: 3 tablets, Disp-45 tablet, R-0Normal  -     HYDROcodone-acetaminophen (NORCO) 5-325 MG per tablet; Take 1 tablet by mouth every 8 hours as needed for Pain for up to 30 days. Intended supply: 30 days. Take lowest dose possible to manage pain Max Daily Amount: 3 tablets, Disp-45 tablet, R-0Normal  -     HYDROcodone-acetaminophen (NORCO) 5-325

## 2025-02-06 NOTE — TELEPHONE ENCOUNTER
Patient contacted the office stating there is a mistake with the pain medication stating, start date is wrong (March) instead of February. Please advise

## 2025-02-10 ENCOUNTER — TELEPHONE (OUTPATIENT)
Facility: CLINIC | Age: 85
End: 2025-02-10

## 2025-02-10 NOTE — TELEPHONE ENCOUNTER
Patient states she has yeast in her mouth from taking antibiotics  patient requesting Nystatin.    [address] - [address] [facility] [place] [phone] - F [phone] [[id number]]

## 2025-03-27 DIAGNOSIS — K21.9 GASTROESOPHAGEAL REFLUX DISEASE WITHOUT ESOPHAGITIS: ICD-10-CM

## 2025-03-27 DIAGNOSIS — G89.29 OTHER CHRONIC PAIN: ICD-10-CM

## 2025-03-27 DIAGNOSIS — G50.0 INFRAORBITAL NEURALGIA: ICD-10-CM

## 2025-03-27 DIAGNOSIS — J30.1 SEASONAL ALLERGIC RHINITIS DUE TO POLLEN: ICD-10-CM

## 2025-03-27 DIAGNOSIS — G62.9 NEUROPATHY: Primary | ICD-10-CM

## 2025-03-28 RX ORDER — FLUTICASONE PROPIONATE 50 MCG
SPRAY, SUSPENSION (ML) NASAL
Qty: 32 G | Refills: 3 | Status: SHIPPED | OUTPATIENT
Start: 2025-03-28

## 2025-03-28 RX ORDER — GABAPENTIN 100 MG/1
CAPSULE ORAL
Qty: 180 CAPSULE | Refills: 1 | Status: SHIPPED | OUTPATIENT
Start: 2025-03-28 | End: 2025-09-24

## 2025-03-28 RX ORDER — MELOXICAM 15 MG/1
15 TABLET ORAL DAILY
Qty: 90 TABLET | Refills: 1 | Status: SHIPPED | OUTPATIENT
Start: 2025-03-28

## 2025-03-28 RX ORDER — OMEPRAZOLE 20 MG/1
20 CAPSULE, DELAYED RELEASE ORAL DAILY
Qty: 90 CAPSULE | Refills: 1 | Status: SHIPPED | OUTPATIENT
Start: 2025-03-28

## 2025-03-28 RX ORDER — PRAMIPEXOLE DIHYDROCHLORIDE 0.12 MG/1
TABLET ORAL
Qty: 180 TABLET | Refills: 1 | Status: SHIPPED | OUTPATIENT
Start: 2025-03-28

## 2025-05-08 ENCOUNTER — OFFICE VISIT (OUTPATIENT)
Facility: CLINIC | Age: 85
End: 2025-05-08

## 2025-05-08 VITALS
HEART RATE: 70 BPM | BODY MASS INDEX: 21.33 KG/M2 | WEIGHT: 120.4 LBS | TEMPERATURE: 98.2 F | SYSTOLIC BLOOD PRESSURE: 191 MMHG | OXYGEN SATURATION: 97 % | RESPIRATION RATE: 16 BRPM | HEIGHT: 63 IN | DIASTOLIC BLOOD PRESSURE: 74 MMHG

## 2025-05-08 DIAGNOSIS — G89.29 OTHER CHRONIC PAIN: Primary | ICD-10-CM

## 2025-05-08 DIAGNOSIS — I10 ESSENTIAL (PRIMARY) HYPERTENSION: ICD-10-CM

## 2025-05-08 DIAGNOSIS — M79.644 PAIN IN FINGER OF RIGHT HAND: ICD-10-CM

## 2025-05-08 RX ORDER — HYDROCODONE BITARTRATE AND ACETAMINOPHEN 5; 325 MG/1; MG/1
1 TABLET ORAL EVERY 12 HOURS PRN
Qty: 45 TABLET | Refills: 0 | Status: SHIPPED | OUTPATIENT
Start: 2025-05-08 | End: 2025-06-07

## 2025-05-08 RX ORDER — HYDROCODONE BITARTRATE AND ACETAMINOPHEN 5; 325 MG/1; MG/1
1 TABLET ORAL EVERY 12 HOURS PRN
Qty: 45 TABLET | Refills: 0 | Status: SHIPPED | OUTPATIENT
Start: 2025-07-07 | End: 2025-08-06

## 2025-05-08 RX ORDER — LISINOPRIL 20 MG/1
20 TABLET ORAL 2 TIMES DAILY
Qty: 180 TABLET | Refills: 1 | Status: SHIPPED | OUTPATIENT
Start: 2025-05-08

## 2025-05-08 RX ORDER — HYDROCODONE BITARTRATE AND ACETAMINOPHEN 5; 325 MG/1; MG/1
1 TABLET ORAL EVERY 6 HOURS PRN
COMMUNITY

## 2025-05-08 RX ORDER — HYDROCODONE BITARTRATE AND ACETAMINOPHEN 5; 325 MG/1; MG/1
1 TABLET ORAL EVERY 12 HOURS PRN
Qty: 45 TABLET | Refills: 0 | Status: SHIPPED | OUTPATIENT
Start: 2025-06-07 | End: 2025-07-07

## 2025-05-08 ASSESSMENT — ENCOUNTER SYMPTOMS: SHORTNESS OF BREATH: 0

## 2025-05-08 NOTE — PATIENT INSTRUCTIONS
The goal blood pressure for most patients is 140/90.    The whole point of treating blood pressure is to prevent strokes, heart attacks and kidney damage.  Persistently elevated blood pressure damages blood vessels and can lead to catastrophic heart problems and strokes.    Recommendations for Hypertension (elevated blood pressure):    Purchase a blood pressure cuff that goes around your upper arm and check blood pressure at least 3 times per week. Write down your numbers for review. Check blood pressure in the morning and evening. Rest for 5-10 minutes first.  Then, with feet flat on the floor and arm resting on a table (at mid-chest level) check your blood pressure    Exercise 30-60 minutes most days of the week, preferably with a mix of cardiovascular and strength training. Exercise can improve blood pressure, even if you do not lose weight!    Limit alcohol intake to less than 3-4 drinks per week.   Avoid tobacco products    Avoid illegal drugs especially amphetamines  DASH diet - includes fruits, vegetables, fiber, and less than 2000 mg sodium (salt) daily.    Try to eat a low sugar diet. Sugar worsens diabetes and activates kidney hormones that raise blood pressure.   Avoid non-steroidal inflammatory medications (NSAIDS) such as ibuprofen, advil, motrin, aleve, and naproxyn as these may increase blood pressure if used long-term    Avoid OTC decongestants such as pseudopherine, phenylephrine, Afrin.  Take your blood pressure medicine (and aspirin if prescribed) religiously.

## 2025-05-08 NOTE — PROGRESS NOTES
Identified pt with two pt identifiers(name and ). Reviewed record in preparation for visit and have obtained necessary documentation. All patient medications has been reviewed.  Chief Complaint   Patient presents with    Medication Check    Blood Pressure Check         Health Maintenance Due   Topic    DTaP/Tdap/Td vaccine (1 - Tdap)    Shingles vaccine (1 of 2)    DEXA (modify frequency per FRAX score)     Respiratory Syncytial Virus (RSV) Pregnant or age 60 yrs+ (1 - 1-dose 75+ series)    COVID-19 Vaccine ( season)    Annual Wellness Visit (Medicare)      Health Maintenance Review: Patient reminded of \"due or due soon\" health maintenance. I have asked the patient to contact his/her primary care provider (PCP) for follow-up on his/her health maintenance.        Wt Readings from Last 3 Encounters:   25 54.6 kg (120 lb 6.4 oz)   25 53 kg (116 lb 12.8 oz)   24 54 kg (119 lb)     Temp Readings from Last 3 Encounters:   25 98.2 °F (36.8 °C) (Oral)   25 98.7 °F (37.1 °C) (Tympanic)   24 98.1 °F (36.7 °C) (Tympanic)     BP Readings from Last 3 Encounters:   25 (!) 191/74   25 (!) 153/75   24 (!) 180/80     Pulse Readings from Last 3 Encounters:   25 70   25 76   24 65       Vitals:    25 0906   BP: (!) 191/74   Pulse: 70   Resp: 16   Temp: 98.2 °F (36.8 °C)   SpO2: 97%        1. \"Have you been to the ER, urgent care clinic since your last visit?  Hospitalized since your last visit?\" No    2. \"Have you seen or consulted any other health care providers outside of the [facility] since your last visit?\" Cardiology.      3. For patients aged 45-75: Has the patient had a colonoscopy / FIT/ Cologuard? NA - based on age      If the patient is female:    4. For patients aged 40-74: Has the patient had a mammogram within the past 2 years? NA - based on age or sex      5. For patients aged 21-65: Has the patient had a pap

## 2025-05-08 NOTE — PROGRESS NOTES
Francisca Arauz  84 y.o. female  1940  MRN:[id number]  [facility]  Progress Note     Encounter Date: 5/8/2025    Assessment and Plan:       ICD-10-CM    1. Other chronic pain  G89.29 HYDROcodone-acetaminophen (NORCO) 5-325 MG per tablet     HYDROcodone-acetaminophen (NORCO) 5-325 MG per tablet     HYDROcodone-acetaminophen (NORCO) 5-325 MG per tablet      2. Essential (primary) hypertension  I10 lisinopril (PRINIVIL;ZESTRIL) 20 MG tablet      3. Pain in finger of right hand  M79.644         1. Other chronic pain-right facial pain after orbital fracture and infraorbital nerve entrapment.  Patient has a good routine that seems to work for her.  Takes 2 gabapentin, 2 pramipexole, one hydrocodone and 1/2 seroquel at bedtime and this gets her through the night.  Refills done for May, June, July   Is ok without signs of diversion or misuse.  FU 3 months  -     HYDROcodone-acetaminophen (NORCO) 5-325 MG per tablet; Take 1 tablet by mouth every 12 hours as needed for Pain for up to 30 days. Take lowest dose possible to manage pain Max Daily Amount: 2 tablets, Disp-45 tablet, R-0Normal  -     HYDROcodone-acetaminophen (NORCO) 5-325 MG per tablet; Take 1 tablet by mouth every 12 hours as needed for Pain for up to 30 days. Take lowest dose possible to manage pain Max Daily Amount: 2 tablets, Disp-45 tablet, R-0Normal  -     HYDROcodone-acetaminophen (NORCO) 5-325 MG per tablet; Take 1 tablet by mouth every 12 hours as needed for Pain for up to 30 days. Take lowest dose possible to manage pain Max Daily Amount: 2 tablets, Disp-45 tablet, R-0Normal  2. Essential (primary) hypertension  Have refilled lisinopril at the dose Cardiology has suggested.  BP up here.  Her BP is better at home and in the 140's  -     lisinopril (PRINIVIL;ZESTRIL) 20 MG tablet; Take 1 tablet by mouth 2 times daily, Disp-180 tablet, R-1Normal  3. Finger arthritis with effusion left fifth PIP  To Hand surgery.        I

## 2025-08-04 ENCOUNTER — TELEPHONE (OUTPATIENT)
Facility: CLINIC | Age: 85
End: 2025-08-04

## 2025-08-04 SDOH — HEALTH STABILITY: PHYSICAL HEALTH: ON AVERAGE, HOW MANY DAYS PER WEEK DO YOU ENGAGE IN MODERATE TO STRENUOUS EXERCISE (LIKE A BRISK WALK)?: 7 DAYS

## 2025-08-04 SDOH — HEALTH STABILITY: PHYSICAL HEALTH: ON AVERAGE, HOW MANY MINUTES DO YOU ENGAGE IN EXERCISE AT THIS LEVEL?: 30 MIN

## 2025-08-04 ASSESSMENT — LIFESTYLE VARIABLES
HOW OFTEN DO YOU HAVE A DRINK CONTAINING ALCOHOL: 1
HOW OFTEN DO YOU HAVE SIX OR MORE DRINKS ON ONE OCCASION: 1
HOW MANY STANDARD DRINKS CONTAINING ALCOHOL DO YOU HAVE ON A TYPICAL DAY: 0
HOW OFTEN DO YOU HAVE A DRINK CONTAINING ALCOHOL: NEVER
HOW MANY STANDARD DRINKS CONTAINING ALCOHOL DO YOU HAVE ON A TYPICAL DAY: PATIENT DOES NOT DRINK

## 2025-08-04 ASSESSMENT — PATIENT HEALTH QUESTIONNAIRE - PHQ9
SUM OF ALL RESPONSES TO PHQ QUESTIONS 1-9: 1
SUM OF ALL RESPONSES TO PHQ QUESTIONS 1-9: 1
1. LITTLE INTEREST OR PLEASURE IN DOING THINGS: NOT AT ALL
SUM OF ALL RESPONSES TO PHQ QUESTIONS 1-9: 1
SUM OF ALL RESPONSES TO PHQ QUESTIONS 1-9: 1
2. FEELING DOWN, DEPRESSED OR HOPELESS: SEVERAL DAYS

## 2025-08-07 ENCOUNTER — OFFICE VISIT (OUTPATIENT)
Facility: CLINIC | Age: 85
End: 2025-08-07

## 2025-08-07 VITALS
OXYGEN SATURATION: 97 % | BODY MASS INDEX: 20.84 KG/M2 | HEIGHT: 63 IN | RESPIRATION RATE: 17 BRPM | SYSTOLIC BLOOD PRESSURE: 124 MMHG | HEART RATE: 78 BPM | TEMPERATURE: 97.7 F | DIASTOLIC BLOOD PRESSURE: 76 MMHG | WEIGHT: 117.6 LBS

## 2025-08-07 DIAGNOSIS — I10 ESSENTIAL (PRIMARY) HYPERTENSION: ICD-10-CM

## 2025-08-07 DIAGNOSIS — G50.0 INFRAORBITAL NEURALGIA: ICD-10-CM

## 2025-08-07 DIAGNOSIS — D50.9 IRON DEFICIENCY ANEMIA, UNSPECIFIED IRON DEFICIENCY ANEMIA TYPE: ICD-10-CM

## 2025-08-07 DIAGNOSIS — Z00.00 MEDICARE ANNUAL WELLNESS VISIT, SUBSEQUENT: Primary | ICD-10-CM

## 2025-08-07 DIAGNOSIS — C43.30 MALIGNANT MELANOMA OF UNSPECIFIED PART OF FACE (HCC): ICD-10-CM

## 2025-08-07 DIAGNOSIS — M79.644 PAIN IN FINGER OF RIGHT HAND: ICD-10-CM

## 2025-08-07 RX ORDER — HYDROCODONE BITARTRATE AND ACETAMINOPHEN 5; 325 MG/1; MG/1
1 TABLET ORAL EVERY 6 HOURS PRN
Qty: 45 TABLET | Refills: 0 | Status: SHIPPED | OUTPATIENT
Start: 2025-08-07 | End: 2025-09-06

## 2025-08-07 RX ORDER — HYDROCODONE BITARTRATE AND ACETAMINOPHEN 5; 325 MG/1; MG/1
1 TABLET ORAL EVERY 6 HOURS PRN
Qty: 45 TABLET | Refills: 0 | Status: SHIPPED | OUTPATIENT
Start: 2025-10-06 | End: 2025-11-05

## 2025-08-07 RX ORDER — HYDROCODONE BITARTRATE AND ACETAMINOPHEN 5; 325 MG/1; MG/1
1 TABLET ORAL EVERY 6 HOURS PRN
Qty: 45 TABLET | Refills: 0 | Status: SHIPPED | OUTPATIENT
Start: 2025-09-06 | End: 2025-10-06

## 2025-08-07 SDOH — ECONOMIC STABILITY: FOOD INSECURITY: WITHIN THE PAST 12 MONTHS, THE FOOD YOU BOUGHT JUST DIDN'T LAST AND YOU DIDN'T HAVE MONEY TO GET MORE.: NEVER TRUE

## 2025-08-07 SDOH — ECONOMIC STABILITY: FOOD INSECURITY: WITHIN THE PAST 12 MONTHS, YOU WORRIED THAT YOUR FOOD WOULD RUN OUT BEFORE YOU GOT MONEY TO BUY MORE.: NEVER TRUE

## 2025-08-07 ASSESSMENT — ENCOUNTER SYMPTOMS
SHORTNESS OF BREATH: 0
BLOOD IN STOOL: 0

## 2025-08-08 DIAGNOSIS — G50.0 INFRAORBITAL NEURALGIA: ICD-10-CM

## 2025-08-08 DIAGNOSIS — I10 ESSENTIAL (PRIMARY) HYPERTENSION: ICD-10-CM

## 2025-08-08 DIAGNOSIS — D50.9 IRON DEFICIENCY ANEMIA, UNSPECIFIED IRON DEFICIENCY ANEMIA TYPE: ICD-10-CM

## 2025-08-08 LAB
ALBUMIN SERPL-MCNC: 4.2 G/DL (ref 3.5–5.2)
ALBUMIN/GLOB SERPL: 1.5 (ref 1.1–2.2)
ALP SERPL-CCNC: 64 U/L (ref 35–104)
ALT SERPL-CCNC: 11 U/L (ref 10–35)
ANION GAP SERPL CALC-SCNC: 8 MMOL/L (ref 2–14)
AST SERPL-CCNC: 22 U/L (ref 10–35)
BASOPHILS # BLD: 0.04 K/UL (ref 0–0.1)
BASOPHILS NFR BLD: 0.9 % (ref 0–1)
BILIRUB DIRECT SERPL-MCNC: 0.2 MG/DL (ref 0.1–0.3)
BILIRUB SERPL-MCNC: 0.6 MG/DL (ref 0–1.2)
BUN SERPL-MCNC: 14 MG/DL (ref 8–23)
BUN/CREAT SERPL: 15 (ref 12–20)
CALCIUM SERPL-MCNC: 9.6 MG/DL (ref 8.8–10.2)
CHLORIDE SERPL-SCNC: 106 MMOL/L (ref 98–107)
CHOLEST SERPL-MCNC: 231 MG/DL (ref 0–200)
CO2 SERPL-SCNC: 26 MMOL/L (ref 20–29)
CREAT SERPL-MCNC: 0.93 MG/DL (ref 0.6–1)
CREAT UR-MCNC: 58.4 MG/DL (ref 28–217)
DIFFERENTIAL METHOD BLD: NORMAL
EOSINOPHIL # BLD: 0.06 K/UL (ref 0–0.4)
EOSINOPHIL NFR BLD: 1.3 % (ref 0–7)
ERYTHROCYTE [DISTWIDTH] IN BLOOD BY AUTOMATED COUNT: 12.5 % (ref 11.5–14.5)
GLOBULIN SER CALC-MCNC: 2.8 G/DL (ref 2–4)
GLUCOSE SERPL-MCNC: 89 MG/DL (ref 65–100)
HCT VFR BLD AUTO: 36.7 % (ref 35–47)
HDLC SERPL-MCNC: 80 MG/DL (ref 40–60)
HDLC SERPL: 2.9 (ref 0–5)
HGB BLD-MCNC: 11.9 G/DL (ref 11.5–16)
IMM GRANULOCYTES # BLD AUTO: 0.01 K/UL (ref 0–0.04)
IMM GRANULOCYTES NFR BLD AUTO: 0.2 % (ref 0–0.5)
LDLC SERPL CALC-MCNC: 134 MG/DL (ref 0–100)
LYMPHOCYTES # BLD: 1.31 K/UL (ref 0.8–3.5)
LYMPHOCYTES NFR BLD: 28.9 % (ref 12–49)
MCH RBC QN AUTO: 31.3 PG (ref 26–34)
MCHC RBC AUTO-ENTMCNC: 32.4 G/DL (ref 30–36.5)
MCV RBC AUTO: 96.6 FL (ref 80–99)
MICROALBUMIN UR-MCNC: <1.2 MG/DL
MICROALBUMIN/CREAT UR-RTO: NORMAL MG/G
MONOCYTES # BLD: 0.38 K/UL (ref 0–1)
MONOCYTES NFR BLD: 8.4 % (ref 5–13)
NEUTS SEG # BLD: 2.74 K/UL (ref 1.8–8)
NEUTS SEG NFR BLD: 60.3 % (ref 32–75)
NRBC # BLD: 0 K/UL (ref 0–0.01)
NRBC BLD-RTO: 0 PER 100 WBC
PLATELET # BLD AUTO: 151 K/UL (ref 150–400)
PMV BLD AUTO: 12 FL (ref 8.9–12.9)
POTASSIUM SERPL-SCNC: 4.8 MMOL/L (ref 3.5–5.1)
PROT SERPL-MCNC: 7 G/DL (ref 6.4–8.3)
RBC # BLD AUTO: 3.8 M/UL (ref 3.8–5.2)
SODIUM SERPL-SCNC: 140 MMOL/L (ref 136–145)
TRIGL SERPL-MCNC: 82 MG/DL (ref 0–150)
VLDLC SERPL CALC-MCNC: 16 MG/DL
WBC # BLD AUTO: 4.5 K/UL (ref 3.6–11)

## 2025-08-11 ENCOUNTER — RESULTS FOLLOW-UP (OUTPATIENT)
Facility: CLINIC | Age: 85
End: 2025-08-11

## 2025-08-12 LAB
AMPHETAMINES UR QL SCN: NEGATIVE NG/ML
BARBITURATES UR QL SCN: NEGATIVE NG/ML
BENZODIAZ UR QL SCN: NEGATIVE NG/ML
BZE UR QL SCN: NEGATIVE NG/ML
CANNABINOIDS UR QL SCN: NEGATIVE NG/ML
CODEINE URINE: NEGATIVE
CREAT UR-MCNC: 54.8 MG/DL (ref 20–300)
HYDROCODONE, URINE CONFIRMATION: 105 NG/ML
HYDROCODONE, URINE: POSITIVE
HYDROMORPHONE, URINE CONFIRMATION: 131 NG/ML
HYDROMORPHONE, URINE: POSITIVE
LABORATORY COMMENT REPORT: NORMAL
METHADONE UR QL SCN: NEGATIVE NG/ML
MORPHINE, OPI1M: NEGATIVE
OPIATES UR QL SCN: NORMAL NG/ML
OPIATES, URINE: POSITIVE NG/ML
OXYCODONE+OXYMORPHONE UR QL SCN: NEGATIVE NG/ML
PCP UR QL: NEGATIVE NG/ML
PH UR: 5.7 (ref 4.5–8.9)
PROPOXYPH UR QL SCN: NEGATIVE NG/ML